# Patient Record
Sex: FEMALE | Race: WHITE | Employment: OTHER | URBAN - METROPOLITAN AREA
[De-identification: names, ages, dates, MRNs, and addresses within clinical notes are randomized per-mention and may not be internally consistent; named-entity substitution may affect disease eponyms.]

---

## 2017-01-10 ENCOUNTER — TELEPHONE (OUTPATIENT)
Dept: INTERNAL MEDICINE CLINIC | Facility: CLINIC | Age: 62
End: 2017-01-10

## 2017-01-10 ENCOUNTER — NURSE ONLY (OUTPATIENT)
Dept: HEMATOLOGY/ONCOLOGY | Facility: HOSPITAL | Age: 62
End: 2017-01-10
Attending: INTERNAL MEDICINE
Payer: MEDICAID

## 2017-01-10 VITALS
TEMPERATURE: 98 F | HEART RATE: 78 BPM | BODY MASS INDEX: 27.48 KG/M2 | DIASTOLIC BLOOD PRESSURE: 72 MMHG | HEIGHT: 66 IN | RESPIRATION RATE: 16 BRPM | WEIGHT: 171 LBS | SYSTOLIC BLOOD PRESSURE: 145 MMHG

## 2017-01-10 DIAGNOSIS — C50.411 BREAST CANCER OF UPPER-OUTER QUADRANT OF RIGHT FEMALE BREAST (HCC): Primary | ICD-10-CM

## 2017-01-10 DIAGNOSIS — Z09 CHEMOTHERAPY FOLLOW-UP EXAMINATION: ICD-10-CM

## 2017-01-10 DIAGNOSIS — E03.9 ACQUIRED HYPOTHYROIDISM: ICD-10-CM

## 2017-01-10 LAB
ALBUMIN SERPL BCP-MCNC: 3.8 G/DL (ref 3.5–4.8)
ALBUMIN/GLOB SERPL: 1.6 {RATIO} (ref 1–2)
ALP SERPL-CCNC: 64 U/L (ref 32–100)
ALT SERPL-CCNC: 32 U/L (ref 14–54)
ANION GAP SERPL CALC-SCNC: 9 MMOL/L (ref 0–18)
AST SERPL-CCNC: 29 U/L (ref 15–41)
BASOPHILS # BLD: 0 K/UL (ref 0–0.2)
BASOPHILS NFR BLD: 0 %
BILIRUB SERPL-MCNC: 0.6 MG/DL (ref 0.3–1.2)
BUN SERPL-MCNC: 11 MG/DL (ref 8–20)
BUN/CREAT SERPL: 25.6 (ref 10–20)
CALCIUM SERPL-MCNC: 8.8 MG/DL (ref 8.5–10.5)
CHLORIDE SERPL-SCNC: 99 MMOL/L (ref 95–110)
CO2 SERPL-SCNC: 23 MMOL/L (ref 22–32)
CREAT SERPL-MCNC: 0.43 MG/DL (ref 0.5–1.5)
EOSINOPHIL # BLD: 0 K/UL (ref 0–0.7)
EOSINOPHIL NFR BLD: 0 %
ERYTHROCYTE [DISTWIDTH] IN BLOOD BY AUTOMATED COUNT: 19.4 % (ref 11–15)
GLOBULIN PLAS-MCNC: 2.4 G/DL (ref 2.5–3.7)
GLUCOSE SERPL-MCNC: 119 MG/DL (ref 70–99)
HCT VFR BLD AUTO: 35.2 % (ref 35–48)
HGB BLD-MCNC: 11.8 G/DL (ref 12–16)
LYMPHOCYTES # BLD: 0.4 K/UL (ref 1–4)
LYMPHOCYTES NFR BLD: 8 %
MCH RBC QN AUTO: 32.5 PG (ref 27–32)
MCHC RBC AUTO-ENTMCNC: 33.6 G/DL (ref 32–37)
MCV RBC AUTO: 96.9 FL (ref 80–100)
MONOCYTES # BLD: 0.3 K/UL (ref 0–1)
MONOCYTES NFR BLD: 5 %
NEUTROPHILS # BLD AUTO: 4.3 K/UL (ref 1.8–7.7)
NEUTROPHILS NFR BLD: 87 %
OSMOLALITY UR CALC.SUM OF ELEC: 273 MOSM/KG (ref 275–295)
PLATELET # BLD AUTO: 140 K/UL (ref 140–400)
PMV BLD AUTO: 8.2 FL (ref 7.4–10.3)
POTASSIUM SERPL-SCNC: 4.1 MMOL/L (ref 3.3–5.1)
PROT SERPL-MCNC: 6.2 G/DL (ref 5.9–8.4)
RBC # BLD AUTO: 3.63 M/UL (ref 3.7–5.4)
SODIUM SERPL-SCNC: 131 MMOL/L (ref 136–144)
WBC # BLD AUTO: 4.9 K/UL (ref 4–11)

## 2017-01-10 PROCEDURE — 99215 OFFICE O/P EST HI 40 MIN: CPT | Performed by: NURSE PRACTITIONER

## 2017-01-10 PROCEDURE — 36415 COLL VENOUS BLD VENIPUNCTURE: CPT

## 2017-01-10 PROCEDURE — 85025 COMPLETE CBC W/AUTO DIFF WBC: CPT

## 2017-01-10 PROCEDURE — 80053 COMPREHEN METABOLIC PANEL: CPT

## 2017-01-10 RX ORDER — HEPARIN SODIUM (PORCINE) LOCK FLUSH IV SOLN 100 UNIT/ML 100 UNIT/ML
SOLUTION INTRAVENOUS
Status: DISCONTINUED
Start: 2017-01-10 | End: 2017-01-10 | Stop reason: WASHOUT

## 2017-01-10 RX ORDER — 0.9 % SODIUM CHLORIDE 0.9 %
VIAL (ML) INJECTION
Status: DISCONTINUED
Start: 2017-01-10 | End: 2017-01-10 | Stop reason: WASHOUT

## 2017-01-10 RX ORDER — HEPARIN SODIUM (PORCINE) LOCK FLUSH IV SOLN 100 UNIT/ML 100 UNIT/ML
5 SOLUTION INTRAVENOUS ONCE
Status: DISCONTINUED | OUTPATIENT
Start: 2017-01-10 | End: 2017-01-10

## 2017-01-10 RX ORDER — GABAPENTIN 100 MG/1
100 CAPSULE ORAL NIGHTLY
Qty: 60 CAPSULE | Refills: 0 | Status: SHIPPED | OUTPATIENT
Start: 2017-01-10 | End: 2017-09-06 | Stop reason: ALTCHOICE

## 2017-01-10 RX ORDER — SODIUM CHLORIDE 0.9 % (FLUSH) 0.9 %
SYRINGE (ML) INJECTION
Status: DISCONTINUED
Start: 2017-01-10 | End: 2017-01-10 | Stop reason: WASHOUT

## 2017-01-10 NOTE — PROGRESS NOTES
DAR         Jacinto Singh is a 63year old female who is here today for follow up of Breast cancer of upper-outer quadrant of right female breast (hcc)  (primary encounter diagnosis)  Chemotherapy follow-up examination       Pt here for follow up. Cardiovascular: Negative for chest pain, palpitations and leg swelling. Gastrointestinal: Positive for diarrhea (Day 3-5 ) and constipation (immediate after chemo).  Negative for nausea, vomiting, abdominal pain, blood in stool, abdominal distention and r • Lipoma of arm    • Cataract      Left eye         Past Surgical History    DILATION/CURETTAGE,DIAGNOSTIC      OTHER      Comment excision of lipoma at L arm and lower back       Social History   Marital Status:   Spouse Name: N/A    Years of Educ Pulmonary/Chest: Effort normal and breath sounds normal. No respiratory distress. She has no wheezes. Right breast exhibits mass. Right breast exhibits no inverted nipple and no skin change.  Left breast exhibits no inverted nipple, no mass, no nipple disch If staging w/u is negative for metastatic disease, then MRI will be obtained to determine extent of disease.   If all disease in on one quadrant, with neoadjuvant chemotherapy and HER2 clif dual blockade, she may be a candidate for breast conservation with t Calculated Osmolality 273 (L) 275-295 mOsm/kg   GFR, Non-African American >60 >=60   GFR, -American >60 >=60   -CBC W/ DIFFERENTIAL   Collection Time: 01/10/17 10:56 AM   Result Value Ref Range   WBC 4.9 4.0-11.0 K/UL   RBC 3.63 (L) 3.70-5.40 M/UL

## 2017-01-10 NOTE — PROGRESS NOTES
Pt is here for fast track labs. pt is c/o extreme fatigue ,labs drawn from lt ac with a  Butterfly needle and tolerated well. Discharged to [facility] and waiting for md appointment.

## 2017-01-11 ENCOUNTER — OFFICE VISIT (OUTPATIENT)
Dept: HEMATOLOGY/ONCOLOGY | Facility: HOSPITAL | Age: 62
End: 2017-01-11
Attending: INTERNAL MEDICINE
Payer: MEDICAID

## 2017-01-11 VITALS
RESPIRATION RATE: 16 BRPM | TEMPERATURE: 98 F | SYSTOLIC BLOOD PRESSURE: 143 MMHG | WEIGHT: 171 LBS | DIASTOLIC BLOOD PRESSURE: 70 MMHG | HEART RATE: 70 BPM | BODY MASS INDEX: 28 KG/M2

## 2017-01-11 DIAGNOSIS — C50.411 BREAST CANCER OF UPPER-OUTER QUADRANT OF RIGHT FEMALE BREAST (HCC): Primary | ICD-10-CM

## 2017-01-11 PROCEDURE — 96417 CHEMO IV INFUS EACH ADDL SEQ: CPT

## 2017-01-11 PROCEDURE — 96372 THER/PROPH/DIAG INJ SC/IM: CPT

## 2017-01-11 PROCEDURE — 96413 CHEMO IV INFUSION 1 HR: CPT

## 2017-01-11 PROCEDURE — 96375 TX/PRO/DX INJ NEW DRUG ADDON: CPT

## 2017-01-11 RX ORDER — SODIUM CHLORIDE 9 MG/ML
INJECTION, SOLUTION INTRAVENOUS
Status: DISCONTINUED
Start: 2017-01-11 | End: 2017-01-11

## 2017-01-11 RX ORDER — SODIUM CHLORIDE 0.9 % (FLUSH) 0.9 %
SYRINGE (ML) INJECTION
Status: DISCONTINUED
Start: 2017-01-11 | End: 2017-01-11

## 2017-01-11 RX ORDER — HEPARIN SODIUM (PORCINE) LOCK FLUSH IV SOLN 100 UNIT/ML 100 UNIT/ML
5 SOLUTION INTRAVENOUS ONCE
Status: COMPLETED | OUTPATIENT
Start: 2017-01-11 | End: 2017-01-11

## 2017-01-11 RX ORDER — 0.9 % SODIUM CHLORIDE 0.9 %
10 VIAL (ML) INJECTION ONCE
Status: COMPLETED | OUTPATIENT
Start: 2017-01-11 | End: 2017-01-11

## 2017-01-11 RX ORDER — HEPARIN SODIUM (PORCINE) LOCK FLUSH IV SOLN 100 UNIT/ML 100 UNIT/ML
SOLUTION INTRAVENOUS
Status: COMPLETED
Start: 2017-01-11 | End: 2017-01-11

## 2017-01-11 RX ORDER — 0.9 % SODIUM CHLORIDE 0.9 %
VIAL (ML) INJECTION
Status: COMPLETED
Start: 2017-01-11 | End: 2017-01-11

## 2017-01-11 RX ADMIN — 0.9 % SODIUM CHLORIDE 10 ML: 0.9 % VIAL (ML) INJECTION at 15:20:00

## 2017-01-11 RX ADMIN — HEPARIN SODIUM (PORCINE) LOCK FLUSH IV SOLN 100 UNIT/ML 500 UNITS: 100 SOLUTION INTRAVENOUS at 15:20:00

## 2017-01-11 NOTE — PROGRESS NOTES
Patient here for Cycle 5 day1 TCHP, patient reports painful neuropathy in fingers and feet. Patient states she is using therapy balls every day. Nail discoloration noted on both hands.   Patient states she has mouth sensitivity all the time, worse after c

## 2017-01-15 RX ORDER — ONDANSETRON 4 MG/1
4 TABLET, ORALLY DISINTEGRATING ORAL EVERY 8 HOURS PRN
Qty: 30 TABLET | Refills: 1 | Status: SHIPPED | OUTPATIENT
Start: 2017-01-15 | End: 2017-03-08

## 2017-01-26 ENCOUNTER — HOSPITAL ENCOUNTER (OUTPATIENT)
Dept: CV DIAGNOSTICS | Facility: HOSPITAL | Age: 62
Discharge: HOME | End: 2017-01-26
Attending: INTERNAL MEDICINE
Payer: MEDICAID

## 2017-01-26 DIAGNOSIS — Z09 CHEMOTHERAPY FOLLOW-UP EXAMINATION: ICD-10-CM

## 2017-01-26 DIAGNOSIS — E03.9 ACQUIRED HYPOTHYROIDISM: ICD-10-CM

## 2017-01-26 DIAGNOSIS — C50.411 BREAST CANCER OF UPPER-OUTER QUADRANT OF RIGHT FEMALE BREAST (HCC): ICD-10-CM

## 2017-01-26 PROCEDURE — 93306 TTE W/DOPPLER COMPLETE: CPT

## 2017-01-26 PROCEDURE — 93306 TTE W/DOPPLER COMPLETE: CPT | Performed by: INTERNAL MEDICINE

## 2017-01-31 ENCOUNTER — SOCIAL WORK SERVICES (OUTPATIENT)
Dept: HEMATOLOGY/ONCOLOGY | Facility: HOSPITAL | Age: 62
End: 2017-01-31

## 2017-01-31 ENCOUNTER — NURSE ONLY (OUTPATIENT)
Dept: HEMATOLOGY/ONCOLOGY | Facility: HOSPITAL | Age: 62
End: 2017-01-31
Attending: INTERNAL MEDICINE
Payer: MEDICAID

## 2017-01-31 VITALS
TEMPERATURE: 98 F | HEART RATE: 75 BPM | WEIGHT: 167 LBS | RESPIRATION RATE: 16 BRPM | SYSTOLIC BLOOD PRESSURE: 153 MMHG | DIASTOLIC BLOOD PRESSURE: 83 MMHG | HEIGHT: 66 IN | BODY MASS INDEX: 26.84 KG/M2

## 2017-01-31 DIAGNOSIS — Z09 CHEMOTHERAPY FOLLOW-UP EXAMINATION: ICD-10-CM

## 2017-01-31 DIAGNOSIS — C50.411 BREAST CANCER OF UPPER-OUTER QUADRANT OF RIGHT FEMALE BREAST (HCC): Primary | ICD-10-CM

## 2017-01-31 LAB
ALBUMIN SERPL BCP-MCNC: 3.9 G/DL (ref 3.5–4.8)
ALBUMIN/GLOB SERPL: 1.9 {RATIO} (ref 1–2)
ALP SERPL-CCNC: 64 U/L (ref 32–100)
ALT SERPL-CCNC: 34 U/L (ref 14–54)
ANION GAP SERPL CALC-SCNC: 7 MMOL/L (ref 0–18)
AST SERPL-CCNC: 34 U/L (ref 15–41)
BASOPHILS # BLD: 0 K/UL (ref 0–0.2)
BASOPHILS NFR BLD: 1 %
BILIRUB SERPL-MCNC: 0.3 MG/DL (ref 0.3–1.2)
BUN SERPL-MCNC: 9 MG/DL (ref 8–20)
BUN/CREAT SERPL: 17.6 (ref 10–20)
CALCIUM SERPL-MCNC: 9.1 MG/DL (ref 8.5–10.5)
CHLORIDE SERPL-SCNC: 102 MMOL/L (ref 95–110)
CO2 SERPL-SCNC: 27 MMOL/L (ref 22–32)
CREAT SERPL-MCNC: 0.51 MG/DL (ref 0.5–1.5)
EOSINOPHIL # BLD: 0 K/UL (ref 0–0.7)
EOSINOPHIL NFR BLD: 0 %
ERYTHROCYTE [DISTWIDTH] IN BLOOD BY AUTOMATED COUNT: 20.3 % (ref 11–15)
GLOBULIN PLAS-MCNC: 2.1 G/DL (ref 2.5–3.7)
GLUCOSE SERPL-MCNC: 110 MG/DL (ref 70–99)
HCT VFR BLD AUTO: 33.8 % (ref 35–48)
HGB BLD-MCNC: 11.3 G/DL (ref 12–16)
LYMPHOCYTES # BLD: 0.4 K/UL (ref 1–4)
LYMPHOCYTES NFR BLD: 10 %
MCH RBC QN AUTO: 33.9 PG (ref 27–32)
MCHC RBC AUTO-ENTMCNC: 33.6 G/DL (ref 32–37)
MCV RBC AUTO: 101 FL (ref 80–100)
MONOCYTES # BLD: 0.1 K/UL (ref 0–1)
MONOCYTES NFR BLD: 4 %
NEUTROPHILS # BLD AUTO: 3.4 K/UL (ref 1.8–7.7)
NEUTROPHILS NFR BLD: 86 %
OSMOLALITY UR CALC.SUM OF ELEC: 281 MOSM/KG (ref 275–295)
PLATELET # BLD AUTO: 110 K/UL (ref 140–400)
PMV BLD AUTO: 8.5 FL (ref 7.4–10.3)
POTASSIUM SERPL-SCNC: 4.2 MMOL/L (ref 3.3–5.1)
PROT SERPL-MCNC: 6 G/DL (ref 5.9–8.4)
RBC # BLD AUTO: 3.35 M/UL (ref 3.7–5.4)
SODIUM SERPL-SCNC: 136 MMOL/L (ref 136–144)
WBC # BLD AUTO: 3.9 K/UL (ref 4–11)

## 2017-01-31 PROCEDURE — 36415 COLL VENOUS BLD VENIPUNCTURE: CPT

## 2017-01-31 PROCEDURE — 85025 COMPLETE CBC W/AUTO DIFF WBC: CPT

## 2017-01-31 PROCEDURE — 99215 OFFICE O/P EST HI 40 MIN: CPT | Performed by: INTERNAL MEDICINE

## 2017-01-31 PROCEDURE — 80053 COMPREHEN METABOLIC PANEL: CPT

## 2017-01-31 NOTE — PROGRESS NOTES
DAR Greenfield is a 63year old female who is here today for follow up of Breast cancer of upper-outer quadrant of right female breast (hcc)  (primary encounter diagnosis)  Chemotherapy follow-up examination       Pt here for follow up. Cardiovascular: Negative for chest pain, palpitations and leg swelling. Gastrointestinal: Positive for nausea, diarrhea (Day 3-5 ) and constipation (immediate after chemo).  Negative for vomiting, abdominal pain, blood in stool, abdominal distention and r Vitamin D3 1000 UNITS Oral Tab Take 1,000 Units by mouth daily. Disp:  Rfl:    Coenzyme Q10 (COQ10 OR) Take by mouth daily. Disp:  Rfl:    LUTEIN-ZEAXANTHIN OR Take by mouth daily.    Disp:  Rfl:    Levothyroxine Sodium 112 MCG Oral Tab Take 112 mcg by mo Cardiovascular: Normal rate, regular rhythm and normal heart sounds. No murmur heard. Pulmonary/Chest: Effort normal and breath sounds normal. No respiratory distress. She has no wheezes. Right breast exhibits mass.  Right breast exhibits no inverted ni On neoadjuvant chemotherapy with s/p cycle 5 TCHP, she may be a candidate for breast conservation with targeted LN dissection if has good response to treatment, followed by RT to the breast and axilla.   If she has disease in more than one quadrant, would t Lymphocyte % 10 %   Monocyte % 4 %   Eosinophil % 0 %   Basophil % 1 %   Neutrophil Absolute 3.4 1.8-7.7 K/UL   Lymphocyte Absolute 0.4 (L) 1.0-4.0 K/UL   Monocyte Absolute 0.1 0.0-1.0 K/UL   Eosinophil Absolute 0.0 0.0-0.7 K/UL   Basophil Absolute 0.0 0.0 2. Aortic valve: Sclerosis without stenosis. Trivial regurgitation. 3. Left atrium: The atrium was mildly dilated. 4. Pulmonary arteries: Systolic pressure was within the normal range.   5. Normal GLS strain pattern at -24% ( previously -20%) this represe Right ventricle:  The cavity size was normal. Systolic function was normal.  Pulmonic valve:   Not well visualized.  The valve appears to be grossly normal.     Doppler:   There was no evidence for stenosis.    Trivial regurgitation.   Tricuspid valve:   St E/Ea, lateral annulus, tissue Doppler           9.42        -------  Ea, medial annulus, tissue Doppler              6.53 cm/s   -------  E/Ea, medial annulus, tissue Doppler           10.12        -------  Mitral valve  Peak E-wave velocity

## 2017-01-31 NOTE — PROGRESS NOTES
Patient to center for fast track labs  Labs drawn peripherally from [facility] 2 x 2 and tape to site  Stable upon discharged  to waiting area

## 2017-01-31 NOTE — PROGRESS NOTES
SW met with patient per referral from Dr. Stephany Kirby. Patient expresses question concerning [facility] and whether she should switch her primary insurance to [facility] as [facility] coverage will be changing as of January 2018.  SW provide supp

## 2017-02-01 ENCOUNTER — OFFICE VISIT (OUTPATIENT)
Dept: HEMATOLOGY/ONCOLOGY | Facility: HOSPITAL | Age: 62
End: 2017-02-01
Attending: INTERNAL MEDICINE
Payer: MEDICAID

## 2017-02-01 VITALS
SYSTOLIC BLOOD PRESSURE: 166 MMHG | TEMPERATURE: 98 F | HEART RATE: 75 BPM | RESPIRATION RATE: 16 BRPM | DIASTOLIC BLOOD PRESSURE: 82 MMHG

## 2017-02-01 DIAGNOSIS — C50.411 BREAST CANCER OF UPPER-OUTER QUADRANT OF RIGHT FEMALE BREAST (HCC): Primary | ICD-10-CM

## 2017-02-01 PROCEDURE — 96413 CHEMO IV INFUSION 1 HR: CPT

## 2017-02-01 PROCEDURE — 96417 CHEMO IV INFUS EACH ADDL SEQ: CPT

## 2017-02-01 PROCEDURE — 96372 THER/PROPH/DIAG INJ SC/IM: CPT

## 2017-02-01 PROCEDURE — 96375 TX/PRO/DX INJ NEW DRUG ADDON: CPT

## 2017-02-01 RX ORDER — HEPARIN SODIUM (PORCINE) LOCK FLUSH IV SOLN 100 UNIT/ML 100 UNIT/ML
SOLUTION INTRAVENOUS
Status: COMPLETED
Start: 2017-02-01 | End: 2017-02-01

## 2017-02-01 RX ORDER — HEPARIN SODIUM (PORCINE) LOCK FLUSH IV SOLN 100 UNIT/ML 100 UNIT/ML
5 SOLUTION INTRAVENOUS ONCE
Status: COMPLETED | OUTPATIENT
Start: 2017-02-01 | End: 2017-02-01

## 2017-02-01 RX ORDER — SODIUM CHLORIDE 9 MG/ML
INJECTION, SOLUTION INTRAVENOUS
Status: DISCONTINUED
Start: 2017-02-01 | End: 2017-02-01

## 2017-02-01 RX ORDER — SODIUM CHLORIDE 0.9 % (FLUSH) 0.9 %
SYRINGE (ML) INJECTION
Status: DISCONTINUED
Start: 2017-02-01 | End: 2017-02-01

## 2017-02-01 RX ORDER — 0.9 % SODIUM CHLORIDE 0.9 %
VIAL (ML) INJECTION
Status: DISCONTINUED
Start: 2017-02-01 | End: 2017-02-01

## 2017-02-01 RX ADMIN — HEPARIN SODIUM (PORCINE) LOCK FLUSH IV SOLN 100 UNIT/ML 500 UNITS: 100 SOLUTION INTRAVENOUS at 14:56:00

## 2017-02-01 NOTE — PROGRESS NOTES
Pt presents to infusion today for C6D1 TCHP. Pt appears well and offers no complaints of nausea/vomiting/constipation/diarrhea at this time. Pt denies any fevers or flu like symptoms.   Pt states that after chemo, for the next couple days she experiences

## 2017-02-02 ENCOUNTER — TELEPHONE (OUTPATIENT)
Dept: INTERNAL MEDICINE CLINIC | Facility: CLINIC | Age: 62
End: 2017-02-02

## 2017-02-02 ENCOUNTER — TELEPHONE (OUTPATIENT)
Dept: HEMATOLOGY/ONCOLOGY | Facility: HOSPITAL | Age: 62
End: 2017-02-02

## 2017-02-02 RX ORDER — CIPROFLOXACIN 500 MG/1
TABLET, FILM COATED ORAL
COMMUNITY
Start: 2017-01-22 | End: 2017-02-22 | Stop reason: ALTCHOICE

## 2017-02-02 NOTE — TELEPHONE ENCOUNTER
Phoned patient and scheduled for post neoadjuvant chemotherapy breast MRI for Thursday 2/9/17. Patient instructed to arrive to [facility] at 11am.  Scheduled patient for post imaging follow up with Dr. Grace Cooper for Wednesday 2/15/17.   Patient acknowle

## 2017-02-09 ENCOUNTER — HOSPITAL ENCOUNTER (OUTPATIENT)
Dept: MRI IMAGING | Facility: HOSPITAL | Age: 62
Discharge: HOME | End: 2017-02-09
Attending: INTERNAL MEDICINE
Payer: MEDICAID

## 2017-02-09 DIAGNOSIS — C50.411 BREAST CANCER OF UPPER-OUTER QUADRANT OF RIGHT FEMALE BREAST (HCC): ICD-10-CM

## 2017-02-09 PROCEDURE — A9575 INJ GADOTERATE MEGLUMI 0.1ML: HCPCS | Performed by: INTERNAL MEDICINE

## 2017-02-09 PROCEDURE — 0159T MRI BREAST (W+WO) W/CAD BILAT (CPT=77059/0159T): CPT

## 2017-02-09 PROCEDURE — 77059 MRI BREAST (W+WO) W/CAD BILAT (CPT=77059/0159T): CPT

## 2017-02-14 ENCOUNTER — TELEPHONE (OUTPATIENT)
Dept: INTERNAL MEDICINE CLINIC | Facility: CLINIC | Age: 62
End: 2017-02-14

## 2017-02-14 DIAGNOSIS — C50.411 BREAST CANCER OF UPPER-OUTER QUADRANT OF RIGHT FEMALE BREAST (HCC): Primary | ICD-10-CM

## 2017-02-15 ENCOUNTER — RESEARCH ENCOUNTER (OUTPATIENT)
Dept: HEMATOLOGY/ONCOLOGY | Facility: HOSPITAL | Age: 62
End: 2017-02-15

## 2017-02-15 ENCOUNTER — OFFICE VISIT (OUTPATIENT)
Dept: SURGERY | Facility: CLINIC | Age: 62
End: 2017-02-15

## 2017-02-15 VITALS
HEART RATE: 70 BPM | DIASTOLIC BLOOD PRESSURE: 75 MMHG | HEIGHT: 66 IN | SYSTOLIC BLOOD PRESSURE: 144 MMHG | BODY MASS INDEX: 26.71 KG/M2 | WEIGHT: 166.19 LBS

## 2017-02-15 DIAGNOSIS — C50.411 BREAST CANCER OF UPPER-OUTER QUADRANT OF RIGHT FEMALE BREAST (HCC): Primary | ICD-10-CM

## 2017-02-15 PROCEDURE — 99215 OFFICE O/P EST HI 40 MIN: CPT | Performed by: SURGERY

## 2017-02-15 NOTE — PROGRESS NOTES
Presented pre-op instructions verbally and in written form. Pt to have Right wire localized partial mastectomy, Right axillary wire localization, Right lymphoscintigraphy and sentinel lymph node biopsy, possible right axillary lymph node dissection.  Pt destini

## 2017-02-15 NOTE — PROGRESS NOTES
Patient seen after visit with Dr. Mackenzie Arauz for discussion of her potential eligibility for the clinical trial [id number], \"A Randomized Phase III Trial Comparing Axillary Lymph Node Dissection to Axillary Radiation in Breast Cancer Patients (cT1-3 N1) Who Have consent [facility] Consent Version #6 1/23/17) for [id number]. Patient expressed that she could be potentially interested in the study. Encouraged patient to take the consent document home to review it and to discuss it with her family and/or friends.  Patient repo

## 2017-02-15 NOTE — PROGRESS NOTES
Follow up: Right breast cancer. PT has completed chemotherapy. New MRI. Pt reports neuropathy pain in fingertips. Rates pain 5/10.      Education Record    Learner:  Patient    Disease / Diagnosis: Right breast cancer     Barriers / Limitations:  None   Com

## 2017-02-16 NOTE — PROGRESS NOTES
Breast Surgery Surveillance    History of Present Illness:   Ms. Laurie Last is a 63year old woman who presents with a right imaging detected breast cancer in September 2016. She was referred for diagnostic imaging which is detailed below.  She de DILATION/CURETTAGE,DIAGNOSTIC      OTHER      Comment excision of lipoma at L arm and lower back     Gynecological History:  Pt is nulliparous.   She achieved menarche at age 17   Age of Menopause: 54's  Type: Natural  She denies any history of hormone repl oz/week 1 Glasses of wine per week       Smoking status: Current Some Day Smoker 0.50 Packs/Day For 30.00 Years   Types: Cigarettes   Smokeless tobacco: Never Used   The patient is . She has no children. She is self-employed as a .     Rev bruising or bleeding or persistent swollen glands or lymph nodes. Musculoskeletal:  The patient denies muscle aches/pain, joint pain, stiff joints, neck pain, back pain or bone pain.     Neuropsychiatric:  There is no history of migraines or severe head dimpling with movement of the pectoralis. There is no nipple retraction. No nipple discharge can be elicited. The parenchyma is mildly nodular. There are no dominant masses in the breast. The axillary tail is normal.    Abdomen:   The abdomen is soft, flat benefits of sentinel node biopsy, the possible need for axillary dissection, and the long-term sequelae of this procedure.     I explained that in light of the multi-focal large disease, with the great MRI response to chemotherapy, she is now a good candida

## 2017-02-21 ENCOUNTER — RESEARCH ENCOUNTER (OUTPATIENT)
Dept: HEMATOLOGY/ONCOLOGY | Facility: HOSPITAL | Age: 62
End: 2017-02-21

## 2017-02-21 NOTE — PROGRESS NOTES
Contacted patient via telephone at [phone].  Patient expressed she reviewed the consent document for [id number], \"A Randomized Phase III Trial Comparing Axillary Lymph Node Dissection to Axillary Radiation in Breast Cancer Patients (cT1-3 N1) Who Have Positive S

## 2017-02-22 ENCOUNTER — OFFICE VISIT (OUTPATIENT)
Dept: HEMATOLOGY/ONCOLOGY | Facility: HOSPITAL | Age: 62
End: 2017-02-22
Attending: INTERNAL MEDICINE
Payer: MEDICAID

## 2017-02-22 ENCOUNTER — TELEPHONE (OUTPATIENT)
Dept: INTERNAL MEDICINE CLINIC | Facility: CLINIC | Age: 62
End: 2017-02-22

## 2017-02-22 VITALS
SYSTOLIC BLOOD PRESSURE: 122 MMHG | WEIGHT: 167 LBS | RESPIRATION RATE: 16 BRPM | BODY MASS INDEX: 26.84 KG/M2 | DIASTOLIC BLOOD PRESSURE: 77 MMHG | HEART RATE: 70 BPM | HEIGHT: 66 IN | TEMPERATURE: 98 F

## 2017-02-22 DIAGNOSIS — R59.9 ENLARGED LYMPH NODE: ICD-10-CM

## 2017-02-22 DIAGNOSIS — C50.411 MALIGNANT NEOPLASM OF UPPER-OUTER QUADRANT OF RIGHT FEMALE BREAST (HCC): Primary | ICD-10-CM

## 2017-02-22 DIAGNOSIS — Z09 CHEMOTHERAPY FOLLOW-UP EXAMINATION: ICD-10-CM

## 2017-02-22 DIAGNOSIS — C50.411 BREAST CANCER OF UPPER-OUTER QUADRANT OF RIGHT FEMALE BREAST (HCC): Primary | ICD-10-CM

## 2017-02-22 PROCEDURE — 99215 OFFICE O/P EST HI 40 MIN: CPT | Performed by: NURSE PRACTITIONER

## 2017-02-22 PROCEDURE — 96413 CHEMO IV INFUSION 1 HR: CPT

## 2017-02-22 RX ORDER — 0.9 % SODIUM CHLORIDE 0.9 %
VIAL (ML) INJECTION
Status: DISCONTINUED
Start: 2017-02-22 | End: 2017-02-22

## 2017-02-22 RX ORDER — HEPARIN SODIUM (PORCINE) LOCK FLUSH IV SOLN 100 UNIT/ML 100 UNIT/ML
5 SOLUTION INTRAVENOUS ONCE
Status: COMPLETED | OUTPATIENT
Start: 2017-02-22 | End: 2017-02-22

## 2017-02-22 RX ORDER — HEPARIN SODIUM (PORCINE) LOCK FLUSH IV SOLN 100 UNIT/ML 100 UNIT/ML
SOLUTION INTRAVENOUS
Status: COMPLETED
Start: 2017-02-22 | End: 2017-02-22

## 2017-02-22 RX ORDER — SODIUM CHLORIDE 9 MG/ML
INJECTION, SOLUTION INTRAVENOUS
Status: DISCONTINUED
Start: 2017-02-22 | End: 2017-02-22

## 2017-02-22 RX ADMIN — HEPARIN SODIUM (PORCINE) LOCK FLUSH IV SOLN 100 UNIT/ML 500 UNITS: 100 SOLUTION INTRAVENOUS at 12:53:00

## 2017-02-22 NOTE — PROGRESS NOTES
Pt arrived to infusion after MD appointment for C7D1 Herceptin. Pt is scheduled for breast surgery next week. She will be delayed for cycle 8 one week because of surgery. Schedule changed for patient so her next treatment well be 3/22.  She is feeling well,

## 2017-02-22 NOTE — PROGRESS NOTES
DAR Deluna is a 63year old female who is here today for follow up of Breast cancer of upper-outer quadrant of right female breast (hcc)  (primary encounter diagnosis)  Chemotherapy follow-up examination       Pt here for follow up. Hands and feet skin reddened dry and cracking  Nail changes    Allergic/Immunologic: Positive for environmental allergies. Neurological: Positive for numbness (numbness and tingling to fingertips and toes ).  Negative for dizziness, weakness, light-headed Years of Education: N/A  Number of Children: 0     Occupational History  [employer]        Social History Main Topics   Smoking status: Current Some Day Smoker  0.50 Packs/Day  For 30.00 Years     Types: Cigarettes    Smokeless tobacco: Never Used Musculoskeletal: She exhibits no edema or tenderness. Lymphadenopathy:        Head (right side): No submental, no submandibular, no preauricular, no posterior auricular and no occipital adenopathy present.         Head (left side): No submental, no subman Will complete herceptin for 1 year. Will delay x 1 week next cycle as pt will be postop. Monitoring cardiac toxicity:  Next echo due in April 2017. No orders of the defined types were placed in this encounter.        Results From Past 48 Hours:  N RIGHT BREAST: Curvilinear T2 hyperintensity seen in the upper central right breast is seen at the site of the [facility] clip (3/28). There is no suspicious surrounding enhancement.  Susceptibility artifact from the coil and ribbon clips is seen in series   DIAGNOSTIC CATEGORY 6--KNOWN BIOPSY PROVEN MALIGNANCY: RIGHT BREAST.         RECOMMENDATIONS:  SURGICAL CONSULTATION.          PLEASE NOTE:  A NORMAL MRI DOES NOT EXCLUDE THE POSSIBILITY OF BREAST CANCER.   A CLINICALLY SUSPICIOUS PALPABLE LUMP SHOULD BE BIO

## 2017-03-01 ENCOUNTER — APPOINTMENT (OUTPATIENT)
Dept: RADIATION ONCOLOGY | Facility: HOSPITAL | Age: 62
End: 2017-03-01
Attending: RADIOLOGY
Payer: MEDICAID

## 2017-03-03 RX ORDER — GABAPENTIN 300 MG/1
300 CAPSULE ORAL 3 TIMES DAILY
Qty: 270 CAPSULE | Refills: 3 | Status: SHIPPED | OUTPATIENT
Start: 2017-03-03 | End: 2018-02-21

## 2017-03-07 ENCOUNTER — OFFICE VISIT (OUTPATIENT)
Dept: INTERNAL MEDICINE CLINIC | Facility: CLINIC | Age: 62
End: 2017-03-07

## 2017-03-07 VITALS
HEIGHT: 66 IN | HEART RATE: 74 BPM | OXYGEN SATURATION: 98 % | BODY MASS INDEX: 26.14 KG/M2 | WEIGHT: 162.63 LBS | DIASTOLIC BLOOD PRESSURE: 80 MMHG | SYSTOLIC BLOOD PRESSURE: 136 MMHG | RESPIRATION RATE: 16 BRPM

## 2017-03-07 DIAGNOSIS — Z01.818 PREOPERATIVE CLEARANCE: Primary | ICD-10-CM

## 2017-03-07 DIAGNOSIS — C50.411 BREAST CANCER OF UPPER-OUTER QUADRANT OF RIGHT FEMALE BREAST (HCC): ICD-10-CM

## 2017-03-07 DIAGNOSIS — E03.9 ACQUIRED HYPOTHYROIDISM: ICD-10-CM

## 2017-03-07 LAB
ALBUMIN SERPL BCP-MCNC: 4 G/DL (ref 3.5–4.8)
ALBUMIN/GLOB SERPL: 1.8 {RATIO} (ref 1–2)
ALP SERPL-CCNC: 57 U/L (ref 32–100)
ALT SERPL-CCNC: 12 U/L (ref 14–54)
ANION GAP SERPL CALC-SCNC: 7 MMOL/L (ref 0–18)
APTT PPP: 31.2 SECONDS (ref 23.2–35.3)
AST SERPL-CCNC: 20 U/L (ref 15–41)
BACTERIA UR QL AUTO: NEGATIVE /HPF
BILIRUB SERPL-MCNC: 0.6 MG/DL (ref 0.3–1.2)
BUN SERPL-MCNC: 13 MG/DL (ref 8–20)
BUN/CREAT SERPL: 22 (ref 10–20)
CALCIUM SERPL-MCNC: 9.1 MG/DL (ref 8.5–10.5)
CHLORIDE SERPL-SCNC: 104 MMOL/L (ref 95–110)
CO2 SERPL-SCNC: 26 MMOL/L (ref 22–32)
CREAT SERPL-MCNC: 0.59 MG/DL (ref 0.5–1.5)
GLOBULIN PLAS-MCNC: 2.2 G/DL (ref 2.5–3.7)
GLUCOSE SERPL-MCNC: 99 MG/DL (ref 70–99)
INR BLD: 1 (ref 0.9–1.2)
OSMOLALITY UR CALC.SUM OF ELEC: 284 MOSM/KG (ref 275–295)
POTASSIUM SERPL-SCNC: 4 MMOL/L (ref 3.3–5.1)
PROT SERPL-MCNC: 6.2 G/DL (ref 5.9–8.4)
PROTHROMBIN TIME: 12.4 SECONDS (ref 11.8–14.5)
RBC #/AREA URNS AUTO: 11 /HPF
SODIUM SERPL-SCNC: 137 MMOL/L (ref 136–144)
WBC #/AREA URNS AUTO: <1 /HPF

## 2017-03-07 PROCEDURE — 99214 OFFICE O/P EST MOD 30 MIN: CPT | Performed by: INTERNAL MEDICINE

## 2017-03-07 PROCEDURE — 85730 THROMBOPLASTIN TIME PARTIAL: CPT | Performed by: INTERNAL MEDICINE

## 2017-03-07 PROCEDURE — 85610 PROTHROMBIN TIME: CPT | Performed by: INTERNAL MEDICINE

## 2017-03-07 PROCEDURE — 80053 COMPREHEN METABOLIC PANEL: CPT | Performed by: INTERNAL MEDICINE

## 2017-03-07 PROCEDURE — 81015 MICROSCOPIC EXAM OF URINE: CPT | Performed by: INTERNAL MEDICINE

## 2017-03-07 PROCEDURE — 36415 COLL VENOUS BLD VENIPUNCTURE: CPT | Performed by: INTERNAL MEDICINE

## 2017-03-07 NOTE — PROGRESS NOTES
HPI:    Patient ID: Reinalod Tracey is a 63year old female. HPIPt here for a peop evaluation for urgical intervention for right breast cancer. Has has a complete response to to chemo therapy .   Has tolerated chemo except for for neuropathy in the Constitutional: She is oriented to person, place, and time. She appears well-developed and well-nourished. No distress. HENT:   Head: Normocephalic and atraumatic. Mouth/Throat: No oropharyngeal exudate.    Eyes: Conjunctivae are normal. Pupils are eq

## 2017-03-08 ENCOUNTER — TELEPHONE (OUTPATIENT)
Dept: HEMATOLOGY/ONCOLOGY | Facility: HOSPITAL | Age: 62
End: 2017-03-08

## 2017-03-08 NOTE — TELEPHONE ENCOUNTER
Patient phoned with questions concerning surgical scheduling. Patient shared she received call from PAT, sharing with her that she was to arrive to the hospital prior to her 7:20am wire Loc.   She was informed her OR time was 2-3pm.  Patient expressed conc

## 2017-03-13 ENCOUNTER — ANESTHESIA (OUTPATIENT)
Dept: SURGERY | Facility: HOSPITAL | Age: 62
End: 2017-03-13
Payer: MEDICAID

## 2017-03-13 ENCOUNTER — HOSPITAL ENCOUNTER (OUTPATIENT)
Dept: MAMMOGRAPHY | Facility: HOSPITAL | Age: 62
Discharge: HOME | End: 2017-03-13
Attending: SURGERY
Payer: MEDICAID

## 2017-03-13 ENCOUNTER — SURGERY (OUTPATIENT)
Age: 62
End: 2017-03-13

## 2017-03-13 ENCOUNTER — HOSPITAL ENCOUNTER (OUTPATIENT)
Facility: HOSPITAL | Age: 62
Setting detail: HOSPITAL OUTPATIENT SURGERY
Discharge: HOME | End: 2017-03-13
Attending: SURGERY | Admitting: SURGERY
Payer: MEDICAID

## 2017-03-13 ENCOUNTER — APPOINTMENT (OUTPATIENT)
Dept: MAMMOGRAPHY | Facility: HOSPITAL | Age: 62
End: 2017-03-13
Attending: SURGERY
Payer: MEDICAID

## 2017-03-13 ENCOUNTER — ANESTHESIA EVENT (OUTPATIENT)
Dept: SURGERY | Facility: HOSPITAL | Age: 62
End: 2017-03-13
Payer: MEDICAID

## 2017-03-13 ENCOUNTER — NURSE NAVIGATOR ENCOUNTER (OUTPATIENT)
Dept: HEMATOLOGY/ONCOLOGY | Facility: HOSPITAL | Age: 62
End: 2017-03-13

## 2017-03-13 ENCOUNTER — HOSPITAL ENCOUNTER (OUTPATIENT)
Dept: ULTRASOUND IMAGING | Facility: HOSPITAL | Age: 62
Discharge: HOME | End: 2017-03-13
Attending: SURGERY
Payer: MEDICAID

## 2017-03-13 ENCOUNTER — HOSPITAL ENCOUNTER (OUTPATIENT)
Dept: NUCLEAR MEDICINE | Facility: HOSPITAL | Age: 62
Discharge: HOME | End: 2017-03-13
Attending: SURGERY
Payer: MEDICAID

## 2017-03-13 VITALS
OXYGEN SATURATION: 98 % | SYSTOLIC BLOOD PRESSURE: 132 MMHG | RESPIRATION RATE: 18 BRPM | HEART RATE: 64 BPM | DIASTOLIC BLOOD PRESSURE: 71 MMHG

## 2017-03-13 VITALS
HEART RATE: 77 BPM | SYSTOLIC BLOOD PRESSURE: 143 MMHG | RESPIRATION RATE: 16 BRPM | DIASTOLIC BLOOD PRESSURE: 68 MMHG | TEMPERATURE: 98 F | HEIGHT: 66 IN | OXYGEN SATURATION: 99 % | WEIGHT: 161 LBS | BODY MASS INDEX: 25.88 KG/M2

## 2017-03-13 DIAGNOSIS — C50.911 BREAST CANCER, RIGHT (HCC): ICD-10-CM

## 2017-03-13 DIAGNOSIS — C50.411 BREAST CANCER OF UPPER-OUTER QUADRANT OF RIGHT FEMALE BREAST (HCC): ICD-10-CM

## 2017-03-13 DIAGNOSIS — C50.411 BREAST CANCER OF UPPER-OUTER QUADRANT OF RIGHT FEMALE BREAST (HCC): Primary | ICD-10-CM

## 2017-03-13 PROCEDURE — 88331 PATH CONSLTJ SURG 1 BLK 1SPC: CPT | Performed by: SURGERY

## 2017-03-13 PROCEDURE — 78195 LYMPH SYSTEM IMAGING: CPT

## 2017-03-13 PROCEDURE — 88342 IMHCHEM/IMCYTCHM 1ST ANTB: CPT | Performed by: SURGERY

## 2017-03-13 PROCEDURE — 0HBT0ZZ EXCISION OF RIGHT BREAST, OPEN APPROACH: ICD-10-PCS | Performed by: SURGERY

## 2017-03-13 PROCEDURE — 77065 DX MAMMO INCL CAD UNI: CPT

## 2017-03-13 PROCEDURE — 0HRTXKZ: ICD-10-PCS | Performed by: SURGERY

## 2017-03-13 PROCEDURE — 76098 X-RAY EXAM SURGICAL SPECIMEN: CPT

## 2017-03-13 PROCEDURE — 19285 PERQ DEV BREAST 1ST US IMAG: CPT

## 2017-03-13 PROCEDURE — 88341 IMHCHEM/IMCYTCHM EA ADD ANTB: CPT | Performed by: SURGERY

## 2017-03-13 PROCEDURE — 88307 TISSUE EXAM BY PATHOLOGIST: CPT | Performed by: SURGERY

## 2017-03-13 PROCEDURE — 07B50ZZ EXCISION OF RIGHT AXILLARY LYMPHATIC, OPEN APPROACH: ICD-10-PCS | Performed by: SURGERY

## 2017-03-13 PROCEDURE — 19282 PERQ DEVICE BREAST EA IMAG: CPT

## 2017-03-13 PROCEDURE — 19281 PERQ DEVICE BREAST 1ST IMAG: CPT

## 2017-03-13 RX ORDER — HYDROMORPHONE HYDROCHLORIDE 1 MG/ML
0.6 INJECTION, SOLUTION INTRAMUSCULAR; INTRAVENOUS; SUBCUTANEOUS EVERY 5 MIN PRN
Status: DISCONTINUED | OUTPATIENT
Start: 2017-03-13 | End: 2017-03-13

## 2017-03-13 RX ORDER — METOCLOPRAMIDE 10 MG/1
10 TABLET ORAL ONCE
Status: DISCONTINUED | OUTPATIENT
Start: 2017-03-13 | End: 2017-03-13 | Stop reason: HOSPADM

## 2017-03-13 RX ORDER — MORPHINE SULFATE 4 MG/ML
4 INJECTION, SOLUTION INTRAMUSCULAR; INTRAVENOUS EVERY 10 MIN PRN
Status: DISCONTINUED | OUTPATIENT
Start: 2017-03-13 | End: 2017-03-13

## 2017-03-13 RX ORDER — FAMOTIDINE 20 MG/1
20 TABLET ORAL ONCE
Status: DISCONTINUED | OUTPATIENT
Start: 2017-03-13 | End: 2017-03-13 | Stop reason: HOSPADM

## 2017-03-13 RX ORDER — HYDROCODONE BITARTRATE AND ACETAMINOPHEN 5; 325 MG/1; MG/1
2 TABLET ORAL AS NEEDED
Status: COMPLETED | OUTPATIENT
Start: 2017-03-13 | End: 2017-03-13

## 2017-03-13 RX ORDER — LIDOCAINE HYDROCHLORIDE 10 MG/ML
INJECTION, SOLUTION EPIDURAL; INFILTRATION; INTRACAUDAL; PERINEURAL
Status: DISCONTINUED
Start: 2017-03-13 | End: 2017-03-13

## 2017-03-13 RX ORDER — LIDOCAINE HYDROCHLORIDE AND EPINEPHRINE 10; 10 MG/ML; UG/ML
INJECTION, SOLUTION INFILTRATION; PERINEURAL AS NEEDED
Status: DISCONTINUED | OUTPATIENT
Start: 2017-03-13 | End: 2017-03-13 | Stop reason: HOSPADM

## 2017-03-13 RX ORDER — SODIUM CHLORIDE, SODIUM LACTATE, POTASSIUM CHLORIDE, CALCIUM CHLORIDE 600; 310; 30; 20 MG/100ML; MG/100ML; MG/100ML; MG/100ML
INJECTION, SOLUTION INTRAVENOUS CONTINUOUS
Status: DISCONTINUED | OUTPATIENT
Start: 2017-03-13 | End: 2017-03-13

## 2017-03-13 RX ORDER — MORPHINE SULFATE 10 MG/ML
6 INJECTION, SOLUTION INTRAMUSCULAR; INTRAVENOUS EVERY 10 MIN PRN
Status: DISCONTINUED | OUTPATIENT
Start: 2017-03-13 | End: 2017-03-13

## 2017-03-13 RX ORDER — HYDROMORPHONE HYDROCHLORIDE 1 MG/ML
0.4 INJECTION, SOLUTION INTRAMUSCULAR; INTRAVENOUS; SUBCUTANEOUS EVERY 5 MIN PRN
Status: DISCONTINUED | OUTPATIENT
Start: 2017-03-13 | End: 2017-03-13

## 2017-03-13 RX ORDER — MIDAZOLAM HYDROCHLORIDE 1 MG/ML
INJECTION INTRAMUSCULAR; INTRAVENOUS AS NEEDED
Status: DISCONTINUED | OUTPATIENT
Start: 2017-03-13 | End: 2017-03-13 | Stop reason: SURG

## 2017-03-13 RX ORDER — HYDROCODONE BITARTRATE AND ACETAMINOPHEN 5; 325 MG/1; MG/1
2 TABLET ORAL AS NEEDED
Status: DISCONTINUED | OUTPATIENT
Start: 2017-03-13 | End: 2017-03-13

## 2017-03-13 RX ORDER — HALOPERIDOL 5 MG/ML
0.25 INJECTION INTRAMUSCULAR ONCE AS NEEDED
Status: DISCONTINUED | OUTPATIENT
Start: 2017-03-13 | End: 2017-03-13

## 2017-03-13 RX ORDER — LIDOCAINE HYDROCHLORIDE 10 MG/ML
5 INJECTION, SOLUTION EPIDURAL; INFILTRATION; INTRACAUDAL; PERINEURAL ONCE
Status: COMPLETED | OUTPATIENT
Start: 2017-03-13 | End: 2017-03-13

## 2017-03-13 RX ORDER — EPHEDRINE SULFATE 50 MG/ML
INJECTION, SOLUTION INTRAVENOUS AS NEEDED
Status: DISCONTINUED | OUTPATIENT
Start: 2017-03-13 | End: 2017-03-13 | Stop reason: SURG

## 2017-03-13 RX ORDER — LIDOCAINE HYDROCHLORIDE 10 MG/ML
INJECTION, SOLUTION EPIDURAL; INFILTRATION; INTRACAUDAL; PERINEURAL
Status: DISCONTINUED
Start: 2017-03-13 | End: 2017-03-13 | Stop reason: WASHOUT

## 2017-03-13 RX ORDER — LIDOCAINE HYDROCHLORIDE 10 MG/ML
INJECTION, SOLUTION EPIDURAL; INFILTRATION; INTRACAUDAL; PERINEURAL AS NEEDED
Status: DISCONTINUED | OUTPATIENT
Start: 2017-03-13 | End: 2017-03-13 | Stop reason: SURG

## 2017-03-13 RX ORDER — BUPIVACAINE HYDROCHLORIDE 5 MG/ML
INJECTION, SOLUTION EPIDURAL; INTRACAUDAL AS NEEDED
Status: DISCONTINUED | OUTPATIENT
Start: 2017-03-13 | End: 2017-03-13 | Stop reason: HOSPADM

## 2017-03-13 RX ORDER — DEXAMETHASONE SODIUM PHOSPHATE 4 MG/ML
VIAL (ML) INJECTION AS NEEDED
Status: DISCONTINUED | OUTPATIENT
Start: 2017-03-13 | End: 2017-03-13 | Stop reason: SURG

## 2017-03-13 RX ORDER — ONDANSETRON 2 MG/ML
4 INJECTION INTRAMUSCULAR; INTRAVENOUS ONCE AS NEEDED
Status: DISCONTINUED | OUTPATIENT
Start: 2017-03-13 | End: 2017-03-13

## 2017-03-13 RX ORDER — NALOXONE HYDROCHLORIDE 0.4 MG/ML
80 INJECTION, SOLUTION INTRAMUSCULAR; INTRAVENOUS; SUBCUTANEOUS AS NEEDED
Status: DISCONTINUED | OUTPATIENT
Start: 2017-03-13 | End: 2017-03-13

## 2017-03-13 RX ORDER — ACETAMINOPHEN 325 MG/1
650 TABLET ORAL ONCE
Status: COMPLETED | OUTPATIENT
Start: 2017-03-13 | End: 2017-03-13

## 2017-03-13 RX ORDER — HYDROMORPHONE HYDROCHLORIDE 1 MG/ML
0.2 INJECTION, SOLUTION INTRAMUSCULAR; INTRAVENOUS; SUBCUTANEOUS EVERY 5 MIN PRN
Status: DISCONTINUED | OUTPATIENT
Start: 2017-03-13 | End: 2017-03-13

## 2017-03-13 RX ORDER — MORPHINE SULFATE 2 MG/ML
2 INJECTION, SOLUTION INTRAMUSCULAR; INTRAVENOUS EVERY 10 MIN PRN
Status: DISCONTINUED | OUTPATIENT
Start: 2017-03-13 | End: 2017-03-13

## 2017-03-13 RX ORDER — HYDROCODONE BITARTRATE AND ACETAMINOPHEN 5; 325 MG/1; MG/1
1 TABLET ORAL AS NEEDED
Status: DISCONTINUED | OUTPATIENT
Start: 2017-03-13 | End: 2017-03-13

## 2017-03-13 RX ORDER — HYDROCODONE BITARTRATE AND ACETAMINOPHEN 5; 325 MG/1; MG/1
1 TABLET ORAL AS NEEDED
Status: COMPLETED | OUTPATIENT
Start: 2017-03-13 | End: 2017-03-13

## 2017-03-13 RX ORDER — HYDROCODONE BITARTRATE AND ACETAMINOPHEN 5; 325 MG/1; MG/1
1-2 TABLET ORAL EVERY 6 HOURS PRN
Qty: 40 TABLET | Refills: 0 | Status: SHIPPED | OUTPATIENT
Start: 2017-03-13 | End: 2017-05-01

## 2017-03-13 RX ORDER — CLINDAMYCIN PHOSPHATE 900 MG/50ML
900 INJECTION INTRAVENOUS EVERY 8 HOURS
Status: DISCONTINUED | OUTPATIENT
Start: 2017-03-13 | End: 2017-03-13 | Stop reason: HOSPADM

## 2017-03-13 RX ADMIN — LIDOCAINE HYDROCHLORIDE 50 MG: 10 INJECTION, SOLUTION EPIDURAL; INFILTRATION; INTRACAUDAL; PERINEURAL at 10:23:00

## 2017-03-13 RX ADMIN — SODIUM CHLORIDE, SODIUM LACTATE, POTASSIUM CHLORIDE, CALCIUM CHLORIDE: 600; 310; 30; 20 INJECTION, SOLUTION INTRAVENOUS at 11:40:00

## 2017-03-13 RX ADMIN — EPHEDRINE SULFATE 10 MG: 50 INJECTION, SOLUTION INTRAVENOUS at 10:35:00

## 2017-03-13 RX ADMIN — MIDAZOLAM HYDROCHLORIDE 2 MG: 1 INJECTION INTRAMUSCULAR; INTRAVENOUS at 10:20:00

## 2017-03-13 RX ADMIN — DEXAMETHASONE SODIUM PHOSPHATE 4 MG: 4 MG/ML VIAL (ML) INJECTION at 10:23:00

## 2017-03-13 NOTE — ANESTHESIA POSTPROCEDURE EVALUATION
Patient: Shannan Montalvo    Procedure Summary     Date Anesthesia Start Anesthesia Stop Room / Location    03/13/17 1020  [facility] MAIN OR 02 / [facility] MAIN OR       Procedure Diagnosis Surgeon Responsible Provider    PARTIAL MASTECTOMY W/ SENTINEL LYMPH NODE

## 2017-03-13 NOTE — OPERATIVE REPORT
[facility]    PATIENT'S NAME: Karson To MICHELLE   ATTENDING PHYSICIAN: Charbel Mcdaniels. Rachel Curiel MD   OPERATING PHYSICIAN: Charbel Mcdaniels.  Rachel Curiel MD   PATIENT ACCOUNT#:   [id number]    LOCATION:  [address]  MEDICAL RECORD #:   [id number] biopsy-proven metastatic lymph node.   We therefore discussed the plan for a right breast wire localized partial mastectomy with right wire localization of the axillary lymph node, right sentinel lymph node biopsy, and possible right axillary lymph node dis analysis.   Further inspection with the gamma counter did reveal 1 additional sentinel node, which was removed with electrocautery and sent for frozen section analysis; the results of which came back as negative for the presence of residual viable disease i approximately 25 sq cm was left in place and therefore, an advancement flap mastopexy was required in order to close this defect and promote optimal healing.   This required a counter incision be placed through the superior aspect of the breast tissue down

## 2017-03-13 NOTE — ANESTHESIA PREPROCEDURE EVALUATION
Anesthesia PreOp Note    HPI:     Jacinto Singh is a 63year old female who presents for preoperative consultation requested by: Jessica Martinez MD    Date of Surgery: 3/13/2017    Procedure(s):  PARTIAL MASTECTOMY W/ SENTINEL LYMPH NODE BIOPSY time   Levothyroxine Sodium 112 MCG Oral Tab Take 112 mcg by mouth before breakfast. Disp:  Rfl:  3/13/2017 at Unknown time       Current Facility-Administered Medications Ordered in Epic:  lactated ringers infusion  Intravenous Continuous Laura Motley CREATSERUM 0.59 03/07/2017   GLU 99 03/07/2017   CA 9.1 03/07/2017       Lab Results  Component Value Date   INR 1.0 03/07/2017       Vital Signs: Body mass index is 26 kg/(m^2). height is 1.676 m (5' 6\") and weight is 73. 029 kg (161 lb).  Her oral te

## 2017-03-13 NOTE — IMAGING NOTE
PT TO MAMMO/US DEPT SCOUTS COMPLETED  AT 0725    HX TAKEN PROCEDURE EXPLAINED ORDERED VERFIED AND INITIALED BY ALL STAFF MEMBERS CONSENT OBTAINED IN PREOP/REVIEWED AND NOTED     DR Hernandez 67 COMPLETED     0745 TIME OUT TAKEN    SITE MARKED 030

## 2017-03-13 NOTE — H&P
History of Present Illness:    Ms. Manny Martinez is a 63year old woman who presents with a right imaging detected breast cancer in September 2016. She was referred for diagnostic imaging which is detailed below.  She denies any palpable masses, nipp   OTHER          Comment  excision of lipoma at L arm and lower back      Gynecological History:  Pt is nulliparous. She achieved menarche at age 17    Age of Menopause: 49's  Type: Natural  She denies any history of hormone replacement therapy.   She de ancestry. Social History:    Alcohol Use:  Yes  0.6 oz/week  1 Glasses of wine per week        Smoking status:  Current Some Day Smoker  0.50 Packs/Day  For 30.00 Years    Types:  Cigarettes    Smokeless tobacco:  Never Used    The patient is . moles.     Hematologic/Lymphatic:  The patient denies easily bruising or bleeding or persistent swollen glands or lymph nodes. Musculoskeletal:  The patient denies muscle aches/pain, joint pain, stiff joints, neck pain, back pain or bone pain.     Neuro The skin, nipple, and areola appear normal. There is no skin dimpling with movement of the pectoralis. There is no nipple retraction. No nipple discharge can be elicited. The parenchyma is mildly nodular.  There are no dominant masses in the breast. The axi staging was also described, including the technique, risks and benefits of sentinel node biopsy, the possible need for axillary dissection, and the long-term sequelae of this procedure.     I explained that in light of the multi-focal large disease, with th alternatives and risks related to not receiving this procedure. We will proceed with procedure as planned.

## 2017-03-13 NOTE — PROCEDURES
[facility]OSP - [facility]  Procedure Note    [facility]:  [facility]    1955 MRN [id number]   Location [place] [address] Attending Lita Bailey MD   Hosp Day # 0 PCP Chely Canales MD     Procedure: Reji Loo

## 2017-03-13 NOTE — PROGRESS NOTES
Met with patient while in Nuclear Medicine. Provided patient with post operative breast pillow. Emotional support provided to patient. She is aware of her post operative follow up with Dr. Gisselle Ahumada and Dr. Kieran Christina.   Encouraged patient to contact my office wit

## 2017-03-15 ENCOUNTER — TELEPHONE (OUTPATIENT)
Dept: SURGERY | Facility: CLINIC | Age: 62
End: 2017-03-15

## 2017-03-15 ENCOUNTER — APPOINTMENT (OUTPATIENT)
Dept: HEMATOLOGY/ONCOLOGY | Facility: HOSPITAL | Age: 62
End: 2017-03-15
Attending: INTERNAL MEDICINE
Payer: MEDICAID

## 2017-03-15 NOTE — TELEPHONE ENCOUNTER
I spoke with the patient regarding her complete pathologic response seen on surgical pathology.  She is feeling well and will see me next week for a clinical exam.

## 2017-03-17 NOTE — PROCEDURES
[facility]  Procedure Note    Arthuro Spearing Patient Status:  Hospital Outpatient Surgery    1955 MRN [id number]   Location [address] [facility] Attending No att. providers found   Hosp Day # 0 PCP Trip Isabel,

## 2017-03-22 ENCOUNTER — OFFICE VISIT (OUTPATIENT)
Dept: SURGERY | Facility: CLINIC | Age: 62
End: 2017-03-22

## 2017-03-22 ENCOUNTER — OFFICE VISIT (OUTPATIENT)
Dept: HEMATOLOGY/ONCOLOGY | Facility: HOSPITAL | Age: 62
End: 2017-03-22
Attending: INTERNAL MEDICINE
Payer: MEDICAID

## 2017-03-22 ENCOUNTER — TELEPHONE (OUTPATIENT)
Dept: INTERNAL MEDICINE CLINIC | Facility: CLINIC | Age: 62
End: 2017-03-22

## 2017-03-22 VITALS
DIASTOLIC BLOOD PRESSURE: 63 MMHG | SYSTOLIC BLOOD PRESSURE: 123 MMHG | HEART RATE: 71 BPM | BODY MASS INDEX: 26.03 KG/M2 | HEIGHT: 66 IN | WEIGHT: 162 LBS

## 2017-03-22 VITALS
BODY MASS INDEX: 26.03 KG/M2 | HEART RATE: 67 BPM | DIASTOLIC BLOOD PRESSURE: 79 MMHG | SYSTOLIC BLOOD PRESSURE: 160 MMHG | TEMPERATURE: 99 F | WEIGHT: 162 LBS | RESPIRATION RATE: 18 BRPM | HEIGHT: 66 IN

## 2017-03-22 DIAGNOSIS — E03.9 ACQUIRED HYPOTHYROIDISM: ICD-10-CM

## 2017-03-22 DIAGNOSIS — R92.2 DENSE BREAST: ICD-10-CM

## 2017-03-22 DIAGNOSIS — C50.411 BREAST CANCER OF UPPER-OUTER QUADRANT OF RIGHT FEMALE BREAST (HCC): ICD-10-CM

## 2017-03-22 DIAGNOSIS — C50.411 BREAST CANCER OF UPPER-OUTER QUADRANT OF RIGHT FEMALE BREAST (HCC): Primary | ICD-10-CM

## 2017-03-22 DIAGNOSIS — Z09 CHEMOTHERAPY FOLLOW-UP EXAMINATION: Primary | ICD-10-CM

## 2017-03-22 DIAGNOSIS — Z09 CHEMOTHERAPY FOLLOW-UP EXAMINATION: ICD-10-CM

## 2017-03-22 DIAGNOSIS — C50.411 MALIGNANT NEOPLASM OF UPPER-OUTER QUADRANT OF RIGHT FEMALE BREAST (HCC): Primary | ICD-10-CM

## 2017-03-22 PROCEDURE — 99215 OFFICE O/P EST HI 40 MIN: CPT | Performed by: INTERNAL MEDICINE

## 2017-03-22 PROCEDURE — 99024 POSTOP FOLLOW-UP VISIT: CPT | Performed by: SURGERY

## 2017-03-22 PROCEDURE — 96413 CHEMO IV INFUSION 1 HR: CPT

## 2017-03-22 RX ORDER — 0.9 % SODIUM CHLORIDE 0.9 %
VIAL (ML) INJECTION
Status: DISCONTINUED
Start: 2017-03-22 | End: 2017-03-22

## 2017-03-22 RX ORDER — HEPARIN SODIUM (PORCINE) LOCK FLUSH IV SOLN 100 UNIT/ML 100 UNIT/ML
5 SOLUTION INTRAVENOUS ONCE
Status: COMPLETED | OUTPATIENT
Start: 2017-03-22 | End: 2017-03-22

## 2017-03-22 RX ORDER — HEPARIN SODIUM (PORCINE) LOCK FLUSH IV SOLN 100 UNIT/ML 100 UNIT/ML
SOLUTION INTRAVENOUS
Status: COMPLETED
Start: 2017-03-22 | End: 2017-03-22

## 2017-03-22 RX ORDER — SODIUM CHLORIDE 9 MG/ML
INJECTION, SOLUTION INTRAVENOUS
Status: DISCONTINUED
Start: 2017-03-22 | End: 2017-03-22

## 2017-03-22 RX ADMIN — HEPARIN SODIUM (PORCINE) LOCK FLUSH IV SOLN 100 UNIT/ML 500 UNITS: 100 SOLUTION INTRAVENOUS at 13:38:00

## 2017-03-22 NOTE — PROGRESS NOTES
Patient arrives for C8 D1 of Herceptin. Patient arrives after MD apt. Patient is a week late due to having a mastectomy last Tuesday 3/14/17.  Patient state she is doing well after surgery, reports she has not needed to take any pain medication and the only

## 2017-03-22 NOTE — PROGRESS NOTES
DAR Garcia is a 63year old female who is here today for follow up of Breast cancer of upper-outer quadrant of right female breast (hcc)  (primary encounter diagnosis)  Chemotherapy follow-up examination       Pt here for follow up. Allergic/Immunologic: Positive for environmental allergies. Neurological: Positive for numbness (numbness and tingling to fingertips and toes ). Negative for dizziness, weakness, light-headedness and headaches. Hematological: Negative for adenopathy.  D Bradley        Social History Main Topics   Smoking status: Current Some Day Smoker  0.50 Packs/Day  For 30.00 Years     Types: Cigarettes    Smokeless tobacco: Never Used    Alcohol Use: No    Drug Use: No    Sexual Activity: Not on file   Not o Abdominal: Soft. Bowel sounds are normal. She exhibits no distension and no mass. There is no hepatosplenomegaly. There is no tenderness. There is no rebound and no guarding. Musculoskeletal: She exhibits no edema or tenderness.    Lymphadenopathy: No orders of the defined types were placed in this encounter. Results From Past 48 Hours:  No results found for this or any previous visit (from the past 48 hour(s)).       Imaging & Referrals:  None   No orders of the defined types were placed in [facility] · Lymph node with metallic clip and wire localization demonstrating areas of fibrosis and vascular proliferation. · No evidence of residual malignancy is identified.   · Immunohistochemical stain for cytokeratin AE1/AE3 is negative, supporting the above di · No evidence of malignancy is seen. · All specimen inked margins are free of malignancy. I. Right breast; reexcision of inferior margin:  · Benign breast tissue with microcyst formation, dense stromal fibrosis, and multifocal microcalcification.   · No

## 2017-03-24 NOTE — PROGRESS NOTES
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 She denies fevers, chills, erythema or drainage from her incisions. She is here today for evaluation and recommendations for further therapy.         Past Medical History   Diagnosis Date   • Cataract      Left eye   • Breast cancer (H Rash    Family History:   Family History   Problem Relation Age of Onset   • parkinson's disease[other] [OTHER] Mother    • Diabetes Mother    • Heart Disorder Mother      pacmaker   • ALS[other] [OTHER] Father    She is not of Ashkenazi Orthodoxy ancestry. incontinence, decreased urine stream, blood in the urine or vaginal/penile discharge. Skin:    The patient denies rash, itching, skin lesions, dry skin, change in skin color or change in moles.      Hematologic/Lymphatic:  The patient denies easily bruis completing Herceptin per Dr. Cyril Lewis. She will meet with Radiation oncology to augment loco-regional control. She will return to see me in 2 weeks for a wound check.  I encouraged her to continue monitoring her ROM and strength and explained that a referral to

## 2017-03-30 ENCOUNTER — OFFICE VISIT (OUTPATIENT)
Dept: RADIATION ONCOLOGY | Facility: HOSPITAL | Age: 62
End: 2017-03-30
Attending: RADIOLOGY
Payer: MEDICAID

## 2017-03-30 ENCOUNTER — TELEPHONE (OUTPATIENT)
Dept: HEMATOLOGY/ONCOLOGY | Facility: HOSPITAL | Age: 62
End: 2017-03-30

## 2017-03-30 VITALS
HEIGHT: 66 IN | WEIGHT: 160.81 LBS | HEART RATE: 67 BPM | DIASTOLIC BLOOD PRESSURE: 65 MMHG | SYSTOLIC BLOOD PRESSURE: 114 MMHG | BODY MASS INDEX: 25.84 KG/M2 | RESPIRATION RATE: 16 BRPM

## 2017-03-30 PROCEDURE — 99212 OFFICE O/P EST SF 10 MIN: CPT

## 2017-03-30 NOTE — PROGRESS NOTES
Nursing Consultation Note  Patient: Marta Nunez  YOB: 1955  Age: 63year old  Radiation Oncologist: Dr. Hallie Higgins  Referring Physician: Loren Roper  Diagnosis:No diagnosis found.   Consult Date: 3/30/2017      Chemotherapy: yes  L capsule (100 mg total) by mouth nightly. Taper as directed by MD Disp: 60 capsule Rfl: 0   Rosuvastatin Calcium 10 MG Oral Tab Take 10 mg by mouth nightly. Disp:  Rfl:    Vitamin D3 1000 UNITS Oral Tab Take 1,000 Units by mouth daily.  Disp:  Rfl:    Dario Education:  y    Are ADL's met? Yes  Does patient feel safe in their environment? Yes  Care decisions:  Patient and/or surrogate IS involved in care decisions. Advanced directives:  Patient DOES NOT have advanced directives.   Transportation:  [facility]

## 2017-03-30 NOTE — PROGRESS NOTES
Primary language:  [place]  Language line required?  no  Comprehension Ability:  excellent  Able to read?  yes  Able to write? yes  Communication tools:  na  Patient's ability to learn:  excellent  Readiness to learn:   Motivated  Learning preferences:  Dis

## 2017-03-30 NOTE — TELEPHONE ENCOUNTER
Pt called and 4/12/17 appointment cancelled due to has a 5/3/17 appointment. Pt will call Radiation tomorrow to schedule appointment. Pt verbalizes understanding.

## 2017-03-30 NOTE — TELEPHONE ENCOUNTER
Rudolph Johnson called and she is wondering if her April 12th doctor visit should be canceled. When she seen Genevieve Fabiana on March 22nd she said she would now she her every 6 weeks which is every other cycle. So that means she wouldn't need to be seen until May.  Akash

## 2017-03-30 NOTE — PROGRESS NOTES
RADIATION ONCOLOGY NOTE    DATE OF VISIT: 3/30/2017    DIAGNOSIS :  Lymph node positive, Her2 positive Right breast cancer, Stage IIA (T1c(m), N1, M0), s/p neoadjuvant herceptin based chemotherapy, TCPH with treatment response, with pathologic CR, s/p lymp f/u MRI on February 9, 2017 which showed no residual enhancement in the Right breast and a decrease in size of the Right axillary lymph node. ROS    A 12 Point review of system was obtained and is as above and per HPI and nursing note.       Current O drink alcohol or use illicit drugs. PAST FAMILY HISTORY   family history includes ALS in her father; Diabetes in her mother; Heart Disorder in her mother; parkinson's disease in her mother. PHYSICAL EXAM  Blood pressure 114/65, pulse 67, resp.  rate 1 and all risk were discussed and all questions were answered. At this time the patient would like to proceed with a course of treatment. Therefore, I will plan a XRT mapping session shortly and will keep you updated.   Thank you for allowing me to take car long axis lymph node was sampled (FNA--Hydromark:  Metastatic adenocarcinoma).    Receptor status from the masses that were cored was reported as ER WI negative HER-2 positive      TECHNIQUE:     Breast MRI was performed using dynamic intravenous gadolinium 207 image 116). In the retroareolar breast, perhaps slightly inner, located 5.2 cm posterior to nipple is a 1.1 x 0.4 x 0.4 cm linear area of non-mass enhancement.  It is subthreshold for kinetic analysis but avidly enhances and is suspicious for malig linear areas of non-mass enhancement are seen in centered in the upper outer and retroareolar right breast. In conglomeration, these areas altogether measure 4.5 x    2.7 x 4.8 cm.   Several enlarged right axillary and right subpectoral lymph nodes are pres D) - Breast, right, 3.) right breast anterior margin,clip marks true margin                              E) - Breast, right, 4.) right breast deep margin, clip marks true margin                                 F) - Breast, right, 5.) right breast medial ma evidence of malignancy is seen. · No evidence of malignancy is seen. · All specimen inked margins are free of malignancy.     F. Right breast; reexcision of medial margin:  · Benign breast tissue with stromal fibrosis, focal foreign body giant cell reacti sentinel lymph nodes from the right axilla are received. Both lymph nodes were negative for metastatic carcinoma on H&E sections.   Immunohistochemical analysis for cytokeratin AE1/AE3 and all sections of the lymph nodes (A1, A2, B1, and B2) was performed LOCATION:   Right axillary lymph node measuring approximately 0.83 0.6 x 0.8 cm. Clip is identified. [place] [facility] MEDICATION:   Superficial and deep buffered 1% lidocaine. COMPLICATIONS:         None.          Dictated by (CST): Jasmyn Maldonado M.D. on 3/13 enhancement. FINDINGS:          RIGHT BREAST: Curvilinear T2 hyperintensity seen in the upper central right breast is seen at the site of the [facility] clip (3/28). There is no suspicious surrounding enhancement.  Susceptibility artifact from the coil breast.      BI-RADS CATEGORY:       DIAGNOSTIC CATEGORY 6--KNOWN BIOPSY PROVEN MALIGNANCY: RIGHT BREAST. RECOMMENDATIONS:    SURGICAL CONSULTATION. PLEASE NOTE:  A NORMAL MRI DOES NOT EXCLUDE THE POSSIBILITY OF BREAST CANCER.   A CLINICALLY

## 2017-03-30 NOTE — PROGRESS NOTES
Skin Plan Of Care:    Skin Problem:  Potential for impaired skin integrity    Problems related to:    Side effects of radiation therapy    Interventions:  Assess skin Instruct patient on skin care Instruct patient on basix skin care measures during radiati

## 2017-03-31 ENCOUNTER — TELEPHONE (OUTPATIENT)
Dept: HEMATOLOGY/ONCOLOGY | Facility: HOSPITAL | Age: 62
End: 2017-03-31

## 2017-03-31 NOTE — PROCEDURES
[facility] HOSP - [facility]  Procedure Note    Nancy Yashira Patient Status:  Hospital Outpatient Surgery    1955 MRN [id number]   Location [address] [facility] Attending No att. providers found   Hosp Day # 0 PCP Rosita Armstrong,

## 2017-04-01 ENCOUNTER — APPOINTMENT (OUTPATIENT)
Dept: RADIATION ONCOLOGY | Facility: HOSPITAL | Age: 62
End: 2017-04-01
Attending: RADIOLOGY
Payer: MEDICAID

## 2017-04-05 ENCOUNTER — HOSPITAL ENCOUNTER (OUTPATIENT)
Facility: HOSPITAL | Age: 62
Discharge: HOME | End: 2017-04-05
Attending: SURGERY
Payer: MEDICAID

## 2017-04-05 ENCOUNTER — TELEPHONE (OUTPATIENT)
Dept: INTERNAL MEDICINE CLINIC | Facility: CLINIC | Age: 62
End: 2017-04-05

## 2017-04-05 ENCOUNTER — OFFICE VISIT (OUTPATIENT)
Dept: SURGERY | Facility: CLINIC | Age: 62
End: 2017-04-05

## 2017-04-05 VITALS — HEART RATE: 68 BPM | SYSTOLIC BLOOD PRESSURE: 145 MMHG | HEIGHT: 66 IN | DIASTOLIC BLOOD PRESSURE: 77 MMHG

## 2017-04-05 DIAGNOSIS — C50.411 BREAST CANCER OF UPPER-OUTER QUADRANT OF RIGHT FEMALE BREAST (HCC): Primary | ICD-10-CM

## 2017-04-05 DIAGNOSIS — Z09 CHEMOTHERAPY FOLLOW-UP EXAMINATION: ICD-10-CM

## 2017-04-05 PROCEDURE — 99024 POSTOP FOLLOW-UP VISIT: CPT | Performed by: SURGERY

## 2017-04-05 PROCEDURE — 99211 OFF/OP EST MAY X REQ PHY/QHP: CPT | Performed by: SURGERY

## 2017-04-05 NOTE — PROGRESS NOTES
Follow up: 2 week follow up for wound check.     Education Record    Learner:  Patient    Disease / Diagnosis: Wound check     Barriers / Limitations:  None   Comments:    Method:  Discussion   Comments:    General Topics:  Plan of care reviewed   Comments:

## 2017-04-11 ENCOUNTER — OFFICE VISIT (OUTPATIENT)
Dept: PHYSICAL THERAPY | Facility: HOSPITAL | Age: 62
End: 2017-04-11
Payer: MEDICAID

## 2017-04-11 ENCOUNTER — OFFICE VISIT (OUTPATIENT)
Dept: HEMATOLOGY/ONCOLOGY | Facility: HOSPITAL | Age: 62
End: 2017-04-11
Attending: INTERNAL MEDICINE
Payer: MEDICAID

## 2017-04-11 VITALS
SYSTOLIC BLOOD PRESSURE: 165 MMHG | RESPIRATION RATE: 16 BRPM | TEMPERATURE: 98 F | DIASTOLIC BLOOD PRESSURE: 75 MMHG | BODY MASS INDEX: 26 KG/M2 | HEART RATE: 58 BPM | WEIGHT: 158.19 LBS

## 2017-04-11 DIAGNOSIS — C50.411 BREAST CANCER OF UPPER-OUTER QUADRANT OF RIGHT FEMALE BREAST (HCC): Primary | ICD-10-CM

## 2017-04-11 PROCEDURE — 77332 RADIATION TREATMENT AID(S): CPT | Performed by: RADIOLOGY

## 2017-04-11 PROCEDURE — 77334 RADIATION TREATMENT AID(S): CPT | Performed by: RADIOLOGY

## 2017-04-11 PROCEDURE — 77290 THER RAD SIMULAJ FIELD CPLX: CPT | Performed by: RADIOLOGY

## 2017-04-11 PROCEDURE — 96413 CHEMO IV INFUSION 1 HR: CPT

## 2017-04-11 RX ORDER — 0.9 % SODIUM CHLORIDE 0.9 %
VIAL (ML) INJECTION
Status: DISCONTINUED
Start: 2017-04-11 | End: 2017-04-11

## 2017-04-11 RX ORDER — SODIUM CHLORIDE 9 MG/ML
INJECTION, SOLUTION INTRAVENOUS
Status: DISCONTINUED
Start: 2017-04-11 | End: 2017-04-11

## 2017-04-11 RX ORDER — HEPARIN SODIUM (PORCINE) LOCK FLUSH IV SOLN 100 UNIT/ML 100 UNIT/ML
5 SOLUTION INTRAVENOUS ONCE
Status: COMPLETED | OUTPATIENT
Start: 2017-04-11 | End: 2017-04-11

## 2017-04-11 RX ORDER — HEPARIN SODIUM (PORCINE) LOCK FLUSH IV SOLN 100 UNIT/ML 100 UNIT/ML
SOLUTION INTRAVENOUS
Status: DISCONTINUED
Start: 2017-04-11 | End: 2017-04-11

## 2017-04-11 RX ADMIN — HEPARIN SODIUM (PORCINE) LOCK FLUSH IV SOLN 100 UNIT/ML 500 UNITS: 100 SOLUTION INTRAVENOUS at 14:05:00

## 2017-04-11 NOTE — PROGRESS NOTES
BREAST CANCER SURGICAL SCREENINGS  [facility]      PATIENT SUMMARY:     Involved Side:          RIGHT                                           Dominant Hand:        RIGHT                          Occupation:                     Inter ext 60 60   ext     ext     Functional IR  T5 T5  Functional IR     Functional IR                      Shoulder strength  Right Left  Shoulder strength  Right Left  Shoulder strength  Right Left    flex 5 5   flex     flex      abd 5 5   abd     abd      e STARTING POINT 15cm from 3rd cuticle 15cm from 3rd cuticle   RIGHT HAND VOLUME 310 310   LEFT HAND VOLUME 315 315   MEASUREMENT A 17 17   MEASUREMENT B 16.6 16.4   MEASUREMENT C 19.1 18.5   MEASUREMENT D 23.4 22.8   MEASUREMENT E 24.5 24.7   MEASUREMENT

## 2017-04-11 NOTE — PROGRESS NOTES
Pt here for C9 Herceptin. She just had mapping completed & after this visit, she'll be headed to physical therapy at [phone]. She is drinking fluids presently, denies any complaints/concerns. PN tingling to fingers, nail changes.  Denies n/v, fever/chills,

## 2017-04-12 ENCOUNTER — APPOINTMENT (OUTPATIENT)
Dept: HEMATOLOGY/ONCOLOGY | Facility: HOSPITAL | Age: 62
End: 2017-04-12
Attending: INTERNAL MEDICINE
Payer: MEDICAID

## 2017-04-12 PROCEDURE — 77334 RADIATION TREATMENT AID(S): CPT | Performed by: RADIOLOGY

## 2017-04-12 PROCEDURE — 77300 RADIATION THERAPY DOSE PLAN: CPT | Performed by: RADIOLOGY

## 2017-04-12 PROCEDURE — 77295 3-D RADIOTHERAPY PLAN: CPT | Performed by: RADIOLOGY

## 2017-04-17 ENCOUNTER — NURSE NAVIGATOR ENCOUNTER (OUTPATIENT)
Dept: HEMATOLOGY/ONCOLOGY | Facility: HOSPITAL | Age: 62
End: 2017-04-17

## 2017-04-17 PROCEDURE — 77307 TELETHX ISODOSE PLAN CPLX: CPT | Performed by: RADIOLOGY

## 2017-04-17 PROCEDURE — 77290 THER RAD SIMULAJ FIELD CPLX: CPT | Performed by: RADIOLOGY

## 2017-04-17 PROCEDURE — 77334 RADIATION TREATMENT AID(S): CPT | Performed by: RADIOLOGY

## 2017-04-18 ENCOUNTER — OFFICE VISIT (OUTPATIENT)
Dept: RADIATION ONCOLOGY | Facility: HOSPITAL | Age: 62
End: 2017-04-18
Attending: RADIOLOGY
Payer: MEDICAID

## 2017-04-18 VITALS
DIASTOLIC BLOOD PRESSURE: 72 MMHG | RESPIRATION RATE: 16 BRPM | BODY MASS INDEX: 25.39 KG/M2 | HEIGHT: 66 IN | SYSTOLIC BLOOD PRESSURE: 165 MMHG | WEIGHT: 158 LBS | HEART RATE: 64 BPM

## 2017-04-18 DIAGNOSIS — C50.411 BREAST CANCER OF UPPER-OUTER QUADRANT OF RIGHT FEMALE BREAST (HCC): Primary | ICD-10-CM

## 2017-04-18 PROCEDURE — 77412 RADIATION TX DELIVERY LVL 3: CPT | Performed by: RADIOLOGY

## 2017-04-18 PROCEDURE — 77280 THER RAD SIMULAJ FIELD SMPL: CPT | Performed by: RADIOLOGY

## 2017-04-18 NOTE — PROGRESS NOTES
[facility] Radiation Treatment Management Note 1-5    Patient:  Ari Ovalles  Age:  63year old  Visit Diagnosis:    1.  Breast cancer of upper-outer quadrant of right female breast [facility])      Primary Rad/Onc:  Dr. Minal Pate

## 2017-04-19 PROCEDURE — 77412 RADIATION TX DELIVERY LVL 3: CPT | Performed by: RADIOLOGY

## 2017-04-19 PROCEDURE — 77387 GUIDANCE FOR RADJ TX DLVR: CPT | Performed by: RADIOLOGY

## 2017-04-20 PROCEDURE — 77412 RADIATION TX DELIVERY LVL 3: CPT | Performed by: RADIOLOGY

## 2017-04-20 PROCEDURE — 77387 GUIDANCE FOR RADJ TX DLVR: CPT | Performed by: RADIOLOGY

## 2017-04-21 PROCEDURE — 77387 GUIDANCE FOR RADJ TX DLVR: CPT | Performed by: RADIOLOGY

## 2017-04-21 PROCEDURE — 77336 RADIATION PHYSICS CONSULT: CPT | Performed by: RADIOLOGY

## 2017-04-21 PROCEDURE — 77412 RADIATION TX DELIVERY LVL 3: CPT | Performed by: RADIOLOGY

## 2017-04-24 ENCOUNTER — OFFICE VISIT (OUTPATIENT)
Dept: RADIATION ONCOLOGY | Facility: HOSPITAL | Age: 62
End: 2017-04-24
Attending: RADIOLOGY
Payer: MEDICAID

## 2017-04-24 VITALS
BODY MASS INDEX: 25.39 KG/M2 | RESPIRATION RATE: 16 BRPM | DIASTOLIC BLOOD PRESSURE: 99 MMHG | HEIGHT: 66 IN | HEART RATE: 62 BPM | SYSTOLIC BLOOD PRESSURE: 155 MMHG | WEIGHT: 158 LBS

## 2017-04-24 DIAGNOSIS — C50.411 BREAST CANCER OF UPPER-OUTER QUADRANT OF RIGHT FEMALE BREAST (HCC): Primary | ICD-10-CM

## 2017-04-24 PROCEDURE — 77387 GUIDANCE FOR RADJ TX DLVR: CPT | Performed by: RADIOLOGY

## 2017-04-24 PROCEDURE — 77412 RADIATION TX DELIVERY LVL 3: CPT | Performed by: RADIOLOGY

## 2017-04-24 NOTE — PROGRESS NOTES
[facility] Radiation Treatment Management Note 1-5    Patient:  Malorie Becerril  Age:  63year old  Visit Diagnosis:    1.  Breast cancer of upper-outer quadrant of right female breast [facility])      Primary Rad/Onc:  Dr. Dao Barksdale

## 2017-04-25 PROCEDURE — 77412 RADIATION TX DELIVERY LVL 3: CPT | Performed by: RADIOLOGY

## 2017-04-25 PROCEDURE — 77387 GUIDANCE FOR RADJ TX DLVR: CPT | Performed by: RADIOLOGY

## 2017-04-26 PROCEDURE — 77412 RADIATION TX DELIVERY LVL 3: CPT | Performed by: RADIOLOGY

## 2017-04-26 PROCEDURE — 77387 GUIDANCE FOR RADJ TX DLVR: CPT | Performed by: RADIOLOGY

## 2017-04-27 ENCOUNTER — HOSPITAL ENCOUNTER (OUTPATIENT)
Dept: CV DIAGNOSTICS | Facility: HOSPITAL | Age: 62
Discharge: HOME | End: 2017-04-27
Attending: INTERNAL MEDICINE
Payer: MEDICAID

## 2017-04-27 DIAGNOSIS — C50.411 BREAST CANCER OF UPPER-OUTER QUADRANT OF RIGHT FEMALE BREAST (HCC): ICD-10-CM

## 2017-04-27 DIAGNOSIS — Z09 CHEMOTHERAPY FOLLOW-UP EXAMINATION: ICD-10-CM

## 2017-04-27 PROCEDURE — 77412 RADIATION TX DELIVERY LVL 3: CPT | Performed by: RADIOLOGY

## 2017-04-27 PROCEDURE — 93306 TTE W/DOPPLER COMPLETE: CPT | Performed by: INTERNAL MEDICINE

## 2017-04-27 PROCEDURE — 93306 TTE W/DOPPLER COMPLETE: CPT

## 2017-04-27 PROCEDURE — 77387 GUIDANCE FOR RADJ TX DLVR: CPT | Performed by: RADIOLOGY

## 2017-04-28 PROCEDURE — 77412 RADIATION TX DELIVERY LVL 3: CPT | Performed by: RADIOLOGY

## 2017-04-28 PROCEDURE — 77336 RADIATION PHYSICS CONSULT: CPT | Performed by: RADIOLOGY

## 2017-04-28 PROCEDURE — 77387 GUIDANCE FOR RADJ TX DLVR: CPT | Performed by: RADIOLOGY

## 2017-05-01 ENCOUNTER — APPOINTMENT (OUTPATIENT)
Dept: RADIATION ONCOLOGY | Facility: HOSPITAL | Age: 62
End: 2017-05-01
Attending: RADIOLOGY
Payer: MEDICAID

## 2017-05-01 VITALS
WEIGHT: 158 LBS | HEART RATE: 58 BPM | SYSTOLIC BLOOD PRESSURE: 163 MMHG | DIASTOLIC BLOOD PRESSURE: 86 MMHG | BODY MASS INDEX: 26 KG/M2 | RESPIRATION RATE: 16 BRPM

## 2017-05-01 DIAGNOSIS — C50.411 BREAST CANCER OF UPPER-OUTER QUADRANT OF RIGHT FEMALE BREAST (HCC): Primary | ICD-10-CM

## 2017-05-01 DIAGNOSIS — IMO0001 EFFECT OF CHEMOTHERAPY, INITIAL ENCOUNTER: ICD-10-CM

## 2017-05-01 PROCEDURE — 77412 RADIATION TX DELIVERY LVL 3: CPT | Performed by: RADIOLOGY

## 2017-05-01 PROCEDURE — 77387 GUIDANCE FOR RADJ TX DLVR: CPT | Performed by: RADIOLOGY

## 2017-05-01 RX ORDER — CEPHALEXIN 500 MG/1
500 CAPSULE ORAL 3 TIMES DAILY
Qty: 30 CAPSULE | Refills: 0 | Status: SHIPPED | OUTPATIENT
Start: 2017-05-01 | End: 2017-08-16 | Stop reason: ALTCHOICE

## 2017-05-01 RX ORDER — HYDROCODONE BITARTRATE AND ACETAMINOPHEN 5; 325 MG/1; MG/1
1-2 TABLET ORAL EVERY 6 HOURS PRN
Qty: 60 TABLET | Refills: 0 | OUTPATIENT
Start: 2017-05-01

## 2017-05-01 NOTE — PROGRESS NOTES
[facility] Radiation Treatment Management Note 6-10    Patient:  Jerrod Greenfield  Age:  63year old  Visit Diagnosis:    1.  Breast cancer of upper-outer quadrant of right female breast [facility])      Primary Rad/Onc:  Dr. Wheatley David

## 2017-05-02 PROCEDURE — 77387 GUIDANCE FOR RADJ TX DLVR: CPT | Performed by: RADIOLOGY

## 2017-05-02 PROCEDURE — 77412 RADIATION TX DELIVERY LVL 3: CPT | Performed by: RADIOLOGY

## 2017-05-03 ENCOUNTER — OFFICE VISIT (OUTPATIENT)
Dept: HEMATOLOGY/ONCOLOGY | Facility: HOSPITAL | Age: 62
End: 2017-05-03
Attending: INTERNAL MEDICINE
Payer: MEDICAID

## 2017-05-03 VITALS
HEART RATE: 59 BPM | TEMPERATURE: 98 F | SYSTOLIC BLOOD PRESSURE: 153 MMHG | RESPIRATION RATE: 16 BRPM | WEIGHT: 158 LBS | DIASTOLIC BLOOD PRESSURE: 77 MMHG | BODY MASS INDEX: 25.39 KG/M2 | HEIGHT: 66 IN

## 2017-05-03 VITALS
SYSTOLIC BLOOD PRESSURE: 153 MMHG | BODY MASS INDEX: 26 KG/M2 | HEART RATE: 59 BPM | WEIGHT: 158 LBS | DIASTOLIC BLOOD PRESSURE: 77 MMHG | RESPIRATION RATE: 16 BRPM | TEMPERATURE: 98 F

## 2017-05-03 DIAGNOSIS — C50.411 BREAST CANCER OF UPPER-OUTER QUADRANT OF RIGHT FEMALE BREAST (HCC): Primary | ICD-10-CM

## 2017-05-03 DIAGNOSIS — Z09 CHEMOTHERAPY FOLLOW-UP EXAMINATION: ICD-10-CM

## 2017-05-03 PROCEDURE — 77412 RADIATION TX DELIVERY LVL 3: CPT | Performed by: RADIOLOGY

## 2017-05-03 PROCEDURE — 96413 CHEMO IV INFUSION 1 HR: CPT

## 2017-05-03 PROCEDURE — 77387 GUIDANCE FOR RADJ TX DLVR: CPT | Performed by: RADIOLOGY

## 2017-05-03 PROCEDURE — 99215 OFFICE O/P EST HI 40 MIN: CPT | Performed by: INTERNAL MEDICINE

## 2017-05-03 RX ORDER — 0.9 % SODIUM CHLORIDE 0.9 %
VIAL (ML) INJECTION
Status: DISCONTINUED
Start: 2017-05-03 | End: 2017-05-03

## 2017-05-03 RX ORDER — HEPARIN SODIUM (PORCINE) LOCK FLUSH IV SOLN 100 UNIT/ML 100 UNIT/ML
SOLUTION INTRAVENOUS
Status: COMPLETED
Start: 2017-05-03 | End: 2017-05-03

## 2017-05-03 RX ORDER — HEPARIN SODIUM (PORCINE) LOCK FLUSH IV SOLN 100 UNIT/ML 100 UNIT/ML
5 SOLUTION INTRAVENOUS ONCE
Status: COMPLETED | OUTPATIENT
Start: 2017-05-03 | End: 2017-05-03

## 2017-05-03 RX ORDER — SODIUM CHLORIDE 9 MG/ML
INJECTION, SOLUTION INTRAVENOUS
Status: DISCONTINUED
Start: 2017-05-03 | End: 2017-05-03

## 2017-05-03 RX ADMIN — HEPARIN SODIUM (PORCINE) LOCK FLUSH IV SOLN 100 UNIT/ML 500 UNITS: 100 SOLUTION INTRAVENOUS at 13:20:00

## 2017-05-03 NOTE — PROGRESS NOTES
Pt here for C10 Herceptin. Complaints of fatigue, PN, stiffness in the am that is worked out by exercise/motion, et nail changes which she states are improving. Herceptin infusing. Passed off care of pt to Mc Kidd at 1312.

## 2017-05-03 NOTE — PROGRESS NOTES
DAR         Jarad Aguilera is a 63year old female who is here today for follow up of Breast cancer of upper-outer quadrant of right female breast (hcc)  (primary encounter diagnosis)  Chemotherapy follow-up examination       Pt here for follow up. Gastrointestinal: Positive for diarrhea (due to something she ate. ). Negative for nausea, vomiting, abdominal pain, constipation, blood in stool, abdominal distention and rectal pain. Endocrine: Negative for cold intolerance and heat intolerance.    Harriett Coenzyme Q10 (COQ10 OR) Take by mouth daily. Disp:  Rfl:    LUTEIN-ZEAXANTHIN OR Take by mouth daily.    Disp:  Rfl:    Levothyroxine Sodium 112 MCG Oral Tab Take 112 mcg by mouth before breakfast. Disp:  Rfl:      Allergies:     Penicillins             R Head: Normocephalic and atraumatic. Right Ear: External ear normal.   Left Ear: External ear normal.   Nose: Nose normal.   Oropharynx slightly red    Eyes: Conjunctivae and EOM are normal. Pupils are equal, round, and reactive to light.  No scleral icter Breast cancer of upper-outer quadrant of right female breast [facility])    Staging form: Breast, AJCC V7      Clinical stage from 10/5/2016: Stage IIA (T1c(3), N1, M0) - Signed by Obi Estrada MD on 10/5/2016      Pathologic stage from 3/22/2017: yT0, N0, cM -------------------------------------------------------------------------------  Study Conclusions    1. Left ventricle: The cavity size was normal. Wall thickness was increased in     a pattern of mild LVH.  Systolic function was normal. The estimated ejec

## 2017-05-03 NOTE — PROGRESS NOTES
Josemanuel completed for next 3 cycles, Brendan Garnica has md exam every 6 weeks. Herceptin infused with no s/s of adverse reaction noted. Port flushed with 10ml ns with positive blood return noted, 500 units heparin, and deaccessed.  Site covered with 2x2 bronwyn tristan

## 2017-05-04 PROCEDURE — 77412 RADIATION TX DELIVERY LVL 3: CPT | Performed by: RADIOLOGY

## 2017-05-04 PROCEDURE — 77387 GUIDANCE FOR RADJ TX DLVR: CPT | Performed by: RADIOLOGY

## 2017-05-05 ENCOUNTER — NURSE ONLY (OUTPATIENT)
Dept: INTERNAL MEDICINE CLINIC | Facility: CLINIC | Age: 62
End: 2017-05-05

## 2017-05-05 DIAGNOSIS — E06.3 HYPOTHYROIDISM DUE TO HASHIMOTO'S THYROIDITIS: ICD-10-CM

## 2017-05-05 DIAGNOSIS — E03.8 HYPOTHYROIDISM DUE TO HASHIMOTO'S THYROIDITIS: ICD-10-CM

## 2017-05-05 PROCEDURE — 84443 ASSAY THYROID STIM HORMONE: CPT | Performed by: INTERNAL MEDICINE

## 2017-05-05 PROCEDURE — 84480 ASSAY TRIIODOTHYRONINE (T3): CPT | Performed by: INTERNAL MEDICINE

## 2017-05-05 PROCEDURE — 36415 COLL VENOUS BLD VENIPUNCTURE: CPT | Performed by: INTERNAL MEDICINE

## 2017-05-05 PROCEDURE — 77412 RADIATION TX DELIVERY LVL 3: CPT | Performed by: RADIOLOGY

## 2017-05-05 PROCEDURE — 84439 ASSAY OF FREE THYROXINE: CPT | Performed by: INTERNAL MEDICINE

## 2017-05-05 PROCEDURE — 77387 GUIDANCE FOR RADJ TX DLVR: CPT | Performed by: RADIOLOGY

## 2017-05-05 PROCEDURE — 77336 RADIATION PHYSICS CONSULT: CPT | Performed by: RADIOLOGY

## 2017-05-08 ENCOUNTER — OFFICE VISIT (OUTPATIENT)
Dept: RADIATION ONCOLOGY | Facility: HOSPITAL | Age: 62
End: 2017-05-08
Attending: RADIOLOGY
Payer: MEDICAID

## 2017-05-08 VITALS — SYSTOLIC BLOOD PRESSURE: 152 MMHG | RESPIRATION RATE: 18 BRPM | DIASTOLIC BLOOD PRESSURE: 70 MMHG | HEART RATE: 61 BPM

## 2017-05-08 DIAGNOSIS — C50.411 BREAST CANCER OF UPPER-OUTER QUADRANT OF RIGHT FEMALE BREAST (HCC): Primary | ICD-10-CM

## 2017-05-08 PROCEDURE — 77387 GUIDANCE FOR RADJ TX DLVR: CPT | Performed by: RADIOLOGY

## 2017-05-08 PROCEDURE — 77412 RADIATION TX DELIVERY LVL 3: CPT | Performed by: RADIOLOGY

## 2017-05-08 NOTE — PROGRESS NOTES
[facility] Radiation Treatment Management Note 11-15    Patient:  Paresh Marquez  Age:  63year old  Visit Diagnosis:    1.  Breast cancer of upper-outer quadrant of right female breast [facility])      Primary Rad/Onc:  Dr. Dulce Mann

## 2017-05-09 PROCEDURE — 77387 GUIDANCE FOR RADJ TX DLVR: CPT | Performed by: RADIOLOGY

## 2017-05-09 PROCEDURE — 77412 RADIATION TX DELIVERY LVL 3: CPT | Performed by: RADIOLOGY

## 2017-05-10 PROCEDURE — 77412 RADIATION TX DELIVERY LVL 3: CPT | Performed by: RADIOLOGY

## 2017-05-10 PROCEDURE — 77387 GUIDANCE FOR RADJ TX DLVR: CPT | Performed by: RADIOLOGY

## 2017-05-11 PROCEDURE — 77387 GUIDANCE FOR RADJ TX DLVR: CPT | Performed by: RADIOLOGY

## 2017-05-11 PROCEDURE — 77412 RADIATION TX DELIVERY LVL 3: CPT | Performed by: RADIOLOGY

## 2017-05-12 PROCEDURE — 77336 RADIATION PHYSICS CONSULT: CPT | Performed by: RADIOLOGY

## 2017-05-12 PROCEDURE — 77387 GUIDANCE FOR RADJ TX DLVR: CPT | Performed by: RADIOLOGY

## 2017-05-12 PROCEDURE — 77412 RADIATION TX DELIVERY LVL 3: CPT | Performed by: RADIOLOGY

## 2017-05-15 ENCOUNTER — OFFICE VISIT (OUTPATIENT)
Dept: RADIATION ONCOLOGY | Facility: HOSPITAL | Age: 62
End: 2017-05-15
Attending: RADIOLOGY
Payer: MEDICAID

## 2017-05-15 VITALS
OXYGEN SATURATION: 99 % | RESPIRATION RATE: 16 BRPM | HEART RATE: 59 BPM | DIASTOLIC BLOOD PRESSURE: 77 MMHG | SYSTOLIC BLOOD PRESSURE: 135 MMHG | TEMPERATURE: 98 F

## 2017-05-15 PROCEDURE — 77387 GUIDANCE FOR RADJ TX DLVR: CPT | Performed by: RADIOLOGY

## 2017-05-15 PROCEDURE — 77412 RADIATION TX DELIVERY LVL 3: CPT | Performed by: RADIOLOGY

## 2017-05-15 RX ORDER — MOMETASONE FUROATE 1 MG/G
1 OINTMENT TOPICAL DAILY
Qty: 1 TUBE | Refills: 1 | Status: SHIPPED | OUTPATIENT
Start: 2017-05-15 | End: 2021-07-01

## 2017-05-15 NOTE — PROGRESS NOTES
[facility] Radiation Treatment Management Note 16-20    Patient:  Oral Bui  Age:  63year old  Visit Diagnosis:  No diagnosis found.   Primary Rad/Onc:  Dr. Kathleen Martinez Ricks      Site Delivered Dose (Gy) Prescribed Dose (Gy) Fraction #

## 2017-05-16 PROCEDURE — 77387 GUIDANCE FOR RADJ TX DLVR: CPT | Performed by: RADIOLOGY

## 2017-05-16 PROCEDURE — 77412 RADIATION TX DELIVERY LVL 3: CPT | Performed by: RADIOLOGY

## 2017-05-17 PROCEDURE — 77387 GUIDANCE FOR RADJ TX DLVR: CPT | Performed by: RADIOLOGY

## 2017-05-17 PROCEDURE — 77412 RADIATION TX DELIVERY LVL 3: CPT | Performed by: RADIOLOGY

## 2017-05-18 PROCEDURE — 77387 GUIDANCE FOR RADJ TX DLVR: CPT | Performed by: RADIOLOGY

## 2017-05-18 PROCEDURE — 77412 RADIATION TX DELIVERY LVL 3: CPT | Performed by: RADIOLOGY

## 2017-05-19 PROCEDURE — 77387 GUIDANCE FOR RADJ TX DLVR: CPT | Performed by: RADIOLOGY

## 2017-05-19 PROCEDURE — 77412 RADIATION TX DELIVERY LVL 3: CPT | Performed by: RADIOLOGY

## 2017-05-19 PROCEDURE — 77336 RADIATION PHYSICS CONSULT: CPT | Performed by: RADIOLOGY

## 2017-05-22 ENCOUNTER — OFFICE VISIT (OUTPATIENT)
Dept: RADIATION ONCOLOGY | Facility: HOSPITAL | Age: 62
End: 2017-05-22
Attending: RADIOLOGY
Payer: MEDICAID

## 2017-05-22 VITALS — HEART RATE: 63 BPM | DIASTOLIC BLOOD PRESSURE: 77 MMHG | SYSTOLIC BLOOD PRESSURE: 135 MMHG | RESPIRATION RATE: 16 BRPM

## 2017-05-22 DIAGNOSIS — C50.411 BREAST CANCER OF UPPER-OUTER QUADRANT OF RIGHT FEMALE BREAST (HCC): Primary | ICD-10-CM

## 2017-05-22 PROCEDURE — 77412 RADIATION TX DELIVERY LVL 3: CPT | Performed by: RADIOLOGY

## 2017-05-22 PROCEDURE — 77387 GUIDANCE FOR RADJ TX DLVR: CPT | Performed by: RADIOLOGY

## 2017-05-22 NOTE — PROGRESS NOTES
[facility] Radiation Treatment Management Note 21-25    Patient:  Flavio Castellon  Age:  63year old  Visit Diagnosis:    1.  Breast cancer of upper-outer quadrant of right female breast [facility])      Primary Rad/Onc:  Dr. Marley Necessary

## 2017-05-23 PROCEDURE — 77280 THER RAD SIMULAJ FIELD SMPL: CPT | Performed by: RADIOLOGY

## 2017-05-23 PROCEDURE — 77412 RADIATION TX DELIVERY LVL 3: CPT | Performed by: RADIOLOGY

## 2017-05-24 ENCOUNTER — OFFICE VISIT (OUTPATIENT)
Dept: HEMATOLOGY/ONCOLOGY | Facility: HOSPITAL | Age: 62
End: 2017-05-24
Attending: INTERNAL MEDICINE
Payer: MEDICAID

## 2017-05-24 VITALS
DIASTOLIC BLOOD PRESSURE: 75 MMHG | HEART RATE: 65 BPM | RESPIRATION RATE: 16 BRPM | SYSTOLIC BLOOD PRESSURE: 143 MMHG | TEMPERATURE: 98 F

## 2017-05-24 DIAGNOSIS — C50.411 BREAST CANCER OF UPPER-OUTER QUADRANT OF RIGHT FEMALE BREAST (HCC): Primary | ICD-10-CM

## 2017-05-24 PROCEDURE — 77412 RADIATION TX DELIVERY LVL 3: CPT | Performed by: RADIOLOGY

## 2017-05-24 PROCEDURE — 77387 GUIDANCE FOR RADJ TX DLVR: CPT | Performed by: RADIOLOGY

## 2017-05-24 PROCEDURE — 96413 CHEMO IV INFUSION 1 HR: CPT

## 2017-05-24 RX ORDER — HEPARIN SODIUM (PORCINE) LOCK FLUSH IV SOLN 100 UNIT/ML 100 UNIT/ML
5 SOLUTION INTRAVENOUS ONCE
Status: COMPLETED | OUTPATIENT
Start: 2017-05-24 | End: 2017-05-24

## 2017-05-24 RX ADMIN — HEPARIN SODIUM (PORCINE) LOCK FLUSH IV SOLN 100 UNIT/ML 500 UNITS: 100 SOLUTION INTRAVENOUS at 16:00:00

## 2017-05-24 NOTE — PROGRESS NOTES
Yomaira Kerns presents for C11D1 Herceptin; port accessed using sterile technique with brisk blood return noted. Cardiac testing in 04/7 appropriate for treatment. Herceptin given in 30 minutes, with no s/s of adverse reaction noted.     Yomaira Kerns has complaint

## 2017-05-25 PROCEDURE — 77412 RADIATION TX DELIVERY LVL 3: CPT | Performed by: RADIOLOGY

## 2017-05-25 PROCEDURE — 77387 GUIDANCE FOR RADJ TX DLVR: CPT | Performed by: RADIOLOGY

## 2017-05-26 PROCEDURE — 77336 RADIATION PHYSICS CONSULT: CPT | Performed by: RADIOLOGY

## 2017-05-30 ENCOUNTER — OFFICE VISIT (OUTPATIENT)
Dept: RADIATION ONCOLOGY | Facility: HOSPITAL | Age: 62
End: 2017-05-30
Attending: RADIOLOGY
Payer: MEDICAID

## 2017-05-30 DIAGNOSIS — C50.411 BREAST CANCER OF UPPER-OUTER QUADRANT OF RIGHT FEMALE BREAST (HCC): Primary | ICD-10-CM

## 2017-05-30 PROCEDURE — 77387 GUIDANCE FOR RADJ TX DLVR: CPT | Performed by: RADIOLOGY

## 2017-05-30 PROCEDURE — 77412 RADIATION TX DELIVERY LVL 3: CPT | Performed by: RADIOLOGY

## 2017-05-31 ENCOUNTER — OFFICE VISIT (OUTPATIENT)
Dept: RADIATION ONCOLOGY | Facility: HOSPITAL | Age: 62
End: 2017-05-31
Attending: RADIOLOGY
Payer: MEDICAID

## 2017-05-31 DIAGNOSIS — C50.411 BREAST CANCER OF UPPER-OUTER QUADRANT OF RIGHT FEMALE BREAST (HCC): Primary | ICD-10-CM

## 2017-05-31 PROCEDURE — 77387 GUIDANCE FOR RADJ TX DLVR: CPT | Performed by: RADIOLOGY

## 2017-05-31 PROCEDURE — 77412 RADIATION TX DELIVERY LVL 3: CPT | Performed by: RADIOLOGY

## 2017-05-31 NOTE — PROGRESS NOTES
[facility] Radiation Treatment Management Note 26-30    Patient:  Latonya Naval  Age:  [address] old  Visit Diagnosis:    1.  Breast cancer of upper-outer quadrant of right female breast [facility])      Primary Rad/Onc:  Dr. Jef Perez

## 2017-05-31 NOTE — PROGRESS NOTES
RADIATION ONCOLOGY COMPLETION SUMMARY NOTE    DIAGNOSIS :  Lymph node positive, Her2 positive Right breast cancer, Stage IIA (T1c(m), N1, M0), s/p neoadjuvant herceptin based chemotherapy, TCPH with treatment response, with pathologic CR, s/p lympectomy an Summary: Course: 1 R Breast    Treatment Site Energy Dose/Fx (Gy) #Fx Dose Correction (Gy) Total Dose (Gy) Start Date End Date Elapsed Days                         R Breast Bst 6X 2 5 / 5 0 10 5/23/2017 5/31/2017 8   R Breast:25Fx 6X 2 25 / 25 0 50 4/18/20

## 2017-06-01 ENCOUNTER — APPOINTMENT (OUTPATIENT)
Dept: RADIATION ONCOLOGY | Facility: HOSPITAL | Age: 62
End: 2017-06-01
Attending: RADIOLOGY
Payer: COMMERCIAL

## 2017-06-14 ENCOUNTER — OFFICE VISIT (OUTPATIENT)
Dept: HEMATOLOGY/ONCOLOGY | Facility: HOSPITAL | Age: 62
End: 2017-06-14
Attending: INTERNAL MEDICINE
Payer: COMMERCIAL

## 2017-06-14 ENCOUNTER — OFFICE VISIT (OUTPATIENT)
Dept: PHYSICAL THERAPY | Facility: HOSPITAL | Age: 62
End: 2017-06-14
Attending: SURGERY
Payer: COMMERCIAL

## 2017-06-14 VITALS
HEART RATE: 73 BPM | SYSTOLIC BLOOD PRESSURE: 139 MMHG | BODY MASS INDEX: 25 KG/M2 | DIASTOLIC BLOOD PRESSURE: 93 MMHG | RESPIRATION RATE: 16 BRPM | TEMPERATURE: 98 F | WEIGHT: 156 LBS

## 2017-06-14 VITALS
BODY MASS INDEX: 25.07 KG/M2 | TEMPERATURE: 98 F | HEART RATE: 73 BPM | RESPIRATION RATE: 16 BRPM | DIASTOLIC BLOOD PRESSURE: 93 MMHG | HEIGHT: 66 IN | WEIGHT: 156 LBS | SYSTOLIC BLOOD PRESSURE: 139 MMHG

## 2017-06-14 DIAGNOSIS — C50.411 MALIGNANT NEOPLASM OF UPPER-OUTER QUADRANT OF RIGHT BREAST IN FEMALE, ESTROGEN RECEPTOR NEGATIVE (HCC): Primary | ICD-10-CM

## 2017-06-14 DIAGNOSIS — Z09 CHEMOTHERAPY FOLLOW-UP EXAMINATION: ICD-10-CM

## 2017-06-14 DIAGNOSIS — Z17.1 MALIGNANT NEOPLASM OF UPPER-OUTER QUADRANT OF RIGHT BREAST IN FEMALE, ESTROGEN RECEPTOR NEGATIVE (HCC): Primary | ICD-10-CM

## 2017-06-14 DIAGNOSIS — C50.411 BREAST CANCER OF UPPER-OUTER QUADRANT OF RIGHT FEMALE BREAST (HCC): ICD-10-CM

## 2017-06-14 PROCEDURE — 99215 OFFICE O/P EST HI 40 MIN: CPT | Performed by: INTERNAL MEDICINE

## 2017-06-14 PROCEDURE — 96413 CHEMO IV INFUSION 1 HR: CPT

## 2017-06-14 RX ORDER — 0.9 % SODIUM CHLORIDE 0.9 %
VIAL (ML) INJECTION
Status: DISCONTINUED
Start: 2017-06-14 | End: 2017-06-14

## 2017-06-14 RX ORDER — HEPARIN SODIUM (PORCINE) LOCK FLUSH IV SOLN 100 UNIT/ML 100 UNIT/ML
5 SOLUTION INTRAVENOUS ONCE
Status: COMPLETED | OUTPATIENT
Start: 2017-06-14 | End: 2017-06-14

## 2017-06-14 RX ORDER — HEPARIN SODIUM (PORCINE) LOCK FLUSH IV SOLN 100 UNIT/ML 100 UNIT/ML
SOLUTION INTRAVENOUS
Status: COMPLETED
Start: 2017-06-14 | End: 2017-06-14

## 2017-06-14 RX ORDER — SODIUM CHLORIDE 9 MG/ML
INJECTION, SOLUTION INTRAVENOUS
Status: DISCONTINUED
Start: 2017-06-14 | End: 2017-06-14

## 2017-06-14 RX ADMIN — HEPARIN SODIUM (PORCINE) LOCK FLUSH IV SOLN 100 UNIT/ML 500 UNITS: 100 SOLUTION INTRAVENOUS at 12:35:00

## 2017-06-14 NOTE — PROGRESS NOTES
BREAST CANCER SURGICAL SCREENINGS  [facility]      PATIENT SUMMARY:     Involved Side:          RIGHT                                           Dominant Hand:        RIGHT                          Occupation:                     Inter ER     ER      IR nt    IR     IR     Sitting flex 150 150  Sitting flex 155 155  Sitting flex 160 160    abd 160 150   abd 160 155   abd 160 160    ext 60 60   ext 60 60   ext 60 60   Functional IR  T5 T5  Functional IR  T5 T5  Functional IR  T5 T5 redness, and/or heat in the at-risk arm, breast, chest, or truncal area? Any feelings of heaviness or tightness, swelling, redness, and/or heat in the at-risk arm, breast, chest, or truncal area?  NO                                     Arm Volume     Ar

## 2017-06-14 NOTE — PROGRESS NOTES
DAR Sheth is a 63year old female who is here today for follow up of Malignant neoplasm of upper-outer quadrant of right breast in female, estrogen receptor negative (hcc)  (primary encounter diagnosis)  Chemotherapy follow-up exam environmental allergies. Neurological: Positive for numbness (numbness and tingling to fingertips and toes. Better at toes. Still on gabapentin. ). Negative for dizziness, weakness, light-headedness and headaches.    Hematological: Negative for adenopath chemotherapy      TCHP 6 cycles (3 completed), Herceptin every 3 weeks (last 2/22/17)         Past Surgical History    DILATION/CURETTAGE,DIAGNOSTIC      OTHER      Comment excision of lipoma at L arm and lower back       Social History   Marital Status: D normal and breath sounds normal. No respiratory distress. She has no wheezes. Abdominal: Soft. Bowel sounds are normal. She exhibits no distension and no mass. There is no hepatosplenomegaly. There is no tenderness. There is no rebound and no guarding. Mammogram of the R breast 6 mo post RT. Will be due for B at that time, will be around November of 2017. PN due to chemo:  Continue neurontin. Hand exercises      No orders of the defined types were placed in this encounter.        Results From

## 2017-06-14 NOTE — PROGRESS NOTES
Patient arrives for C12 D1 of Herceptin. Patient arrives after MD apt. Patient reports she is feeling well, states she still has some issues with peripheral neuropathy but is able to complete her ADLs.  Patient states she has noticed she has become weaker,

## 2017-06-19 RX ORDER — LEVOTHYROXINE SODIUM 112 UG/1
TABLET ORAL
Qty: 90 TABLET | Refills: 0 | Status: SHIPPED | OUTPATIENT
Start: 2017-06-19 | End: 2017-09-10

## 2017-06-29 ENCOUNTER — TELEPHONE (OUTPATIENT)
Dept: INTERNAL MEDICINE CLINIC | Facility: CLINIC | Age: 62
End: 2017-06-29

## 2017-06-29 DIAGNOSIS — C50.411 MALIGNANT NEOPLASM OF UPPER-OUTER QUADRANT OF RIGHT BREAST IN FEMALE, ESTROGEN RECEPTOR NEGATIVE (HCC): Primary | ICD-10-CM

## 2017-06-29 DIAGNOSIS — Z09 CHEMOTHERAPY FOLLOW-UP EXAMINATION: ICD-10-CM

## 2017-06-29 DIAGNOSIS — Z17.1 MALIGNANT NEOPLASM OF UPPER-OUTER QUADRANT OF RIGHT BREAST IN FEMALE, ESTROGEN RECEPTOR NEGATIVE (HCC): Primary | ICD-10-CM

## 2017-06-29 NOTE — TELEPHONE ENCOUNTER
Test entered by Dr Ronnie Cardona not authorized yet, patient wants to schedule test between July 5 and before July 26 as she sees Dr Ronnie Cardona after the chemo July 26.

## 2017-07-05 ENCOUNTER — OFFICE VISIT (OUTPATIENT)
Dept: HEMATOLOGY/ONCOLOGY | Facility: HOSPITAL | Age: 62
End: 2017-07-05
Attending: INTERNAL MEDICINE
Payer: COMMERCIAL

## 2017-07-05 VITALS
RESPIRATION RATE: 16 BRPM | TEMPERATURE: 99 F | HEART RATE: 67 BPM | SYSTOLIC BLOOD PRESSURE: 155 MMHG | DIASTOLIC BLOOD PRESSURE: 81 MMHG

## 2017-07-05 DIAGNOSIS — C50.411 BREAST CANCER OF UPPER-OUTER QUADRANT OF RIGHT FEMALE BREAST (HCC): ICD-10-CM

## 2017-07-05 DIAGNOSIS — Z17.1 MALIGNANT NEOPLASM OF UPPER-OUTER QUADRANT OF RIGHT BREAST IN FEMALE, ESTROGEN RECEPTOR NEGATIVE (HCC): Primary | ICD-10-CM

## 2017-07-05 DIAGNOSIS — C50.411 MALIGNANT NEOPLASM OF UPPER-OUTER QUADRANT OF RIGHT BREAST IN FEMALE, ESTROGEN RECEPTOR NEGATIVE (HCC): Primary | ICD-10-CM

## 2017-07-05 PROCEDURE — 96413 CHEMO IV INFUSION 1 HR: CPT

## 2017-07-05 RX ORDER — 0.9 % SODIUM CHLORIDE 0.9 %
VIAL (ML) INJECTION
Status: DISCONTINUED
Start: 2017-07-05 | End: 2017-07-05

## 2017-07-05 RX ORDER — HEPARIN SODIUM (PORCINE) LOCK FLUSH IV SOLN 100 UNIT/ML 100 UNIT/ML
5 SOLUTION INTRAVENOUS ONCE
Status: CANCELLED | OUTPATIENT
Start: 2017-07-05

## 2017-07-05 RX ORDER — SODIUM CHLORIDE 9 MG/ML
INJECTION, SOLUTION INTRAVENOUS
Status: DISCONTINUED
Start: 2017-07-05 | End: 2017-07-05

## 2017-07-05 RX ORDER — 0.9 % SODIUM CHLORIDE 0.9 %
10 VIAL (ML) INJECTION ONCE
Status: CANCELLED | OUTPATIENT
Start: 2017-07-05

## 2017-07-05 RX ORDER — HEPARIN SODIUM (PORCINE) LOCK FLUSH IV SOLN 100 UNIT/ML 100 UNIT/ML
5 SOLUTION INTRAVENOUS ONCE
Status: COMPLETED | OUTPATIENT
Start: 2017-07-05 | End: 2017-07-05

## 2017-07-05 RX ORDER — HEPARIN SODIUM (PORCINE) LOCK FLUSH IV SOLN 100 UNIT/ML 100 UNIT/ML
SOLUTION INTRAVENOUS
Status: COMPLETED
Start: 2017-07-05 | End: 2017-07-05

## 2017-07-05 RX ADMIN — HEPARIN SODIUM (PORCINE) LOCK FLUSH IV SOLN 100 UNIT/ML 500 UNITS: 100 SOLUTION INTRAVENOUS at 12:02:00

## 2017-07-05 NOTE — PROGRESS NOTES
Patient arrives for C13 D1 of Herceptin. Patient reports she is feeling well, states she still has some issues with peripheral neuropathy but is able to complete her ADLs. Reports she was unable to sleep last night, reports some fatigue today.  Port [employer]

## 2017-07-18 ENCOUNTER — HOSPITAL ENCOUNTER (OUTPATIENT)
Dept: CV DIAGNOSTICS | Facility: HOSPITAL | Age: 62
Discharge: HOME | End: 2017-07-18
Attending: INTERNAL MEDICINE
Payer: COMMERCIAL

## 2017-07-18 DIAGNOSIS — Z09 CHEMOTHERAPY FOLLOW-UP EXAMINATION: ICD-10-CM

## 2017-07-18 DIAGNOSIS — C50.411 MALIGNANT NEOPLASM OF UPPER-OUTER QUADRANT OF RIGHT BREAST IN FEMALE, ESTROGEN RECEPTOR NEGATIVE (HCC): ICD-10-CM

## 2017-07-18 DIAGNOSIS — Z17.1 MALIGNANT NEOPLASM OF UPPER-OUTER QUADRANT OF RIGHT BREAST IN FEMALE, ESTROGEN RECEPTOR NEGATIVE (HCC): ICD-10-CM

## 2017-07-18 PROCEDURE — 93306 TTE W/DOPPLER COMPLETE: CPT | Performed by: INTERNAL MEDICINE

## 2017-07-26 ENCOUNTER — OFFICE VISIT (OUTPATIENT)
Dept: HEMATOLOGY/ONCOLOGY | Facility: HOSPITAL | Age: 62
End: 2017-07-26
Attending: INTERNAL MEDICINE
Payer: COMMERCIAL

## 2017-07-26 VITALS
SYSTOLIC BLOOD PRESSURE: 151 MMHG | DIASTOLIC BLOOD PRESSURE: 75 MMHG | BODY MASS INDEX: 25.23 KG/M2 | WEIGHT: 157 LBS | HEART RATE: 62 BPM | HEIGHT: 66 IN | RESPIRATION RATE: 18 BRPM | TEMPERATURE: 98 F

## 2017-07-26 DIAGNOSIS — Z17.1 MALIGNANT NEOPLASM OF UPPER-OUTER QUADRANT OF RIGHT BREAST IN FEMALE, ESTROGEN RECEPTOR NEGATIVE (HCC): Primary | ICD-10-CM

## 2017-07-26 DIAGNOSIS — C50.411 BREAST CANCER OF UPPER-OUTER QUADRANT OF RIGHT FEMALE BREAST (HCC): ICD-10-CM

## 2017-07-26 DIAGNOSIS — Z09 CHEMOTHERAPY FOLLOW-UP EXAMINATION: ICD-10-CM

## 2017-07-26 DIAGNOSIS — C50.411 MALIGNANT NEOPLASM OF UPPER-OUTER QUADRANT OF RIGHT BREAST IN FEMALE, ESTROGEN RECEPTOR NEGATIVE (HCC): Primary | ICD-10-CM

## 2017-07-26 PROCEDURE — 96413 CHEMO IV INFUSION 1 HR: CPT

## 2017-07-26 PROCEDURE — 99212 OFFICE O/P EST SF 10 MIN: CPT | Performed by: NURSE PRACTITIONER

## 2017-07-26 PROCEDURE — 99214 OFFICE O/P EST MOD 30 MIN: CPT | Performed by: NURSE PRACTITIONER

## 2017-07-26 RX ORDER — HEPARIN SODIUM (PORCINE) LOCK FLUSH IV SOLN 100 UNIT/ML 100 UNIT/ML
5 SOLUTION INTRAVENOUS ONCE
Status: CANCELLED | OUTPATIENT
Start: 2017-07-26

## 2017-07-26 RX ORDER — 0.9 % SODIUM CHLORIDE 0.9 %
10 VIAL (ML) INJECTION ONCE
Status: CANCELLED | OUTPATIENT
Start: 2017-07-26

## 2017-07-26 RX ORDER — HEPARIN SODIUM (PORCINE) LOCK FLUSH IV SOLN 100 UNIT/ML 100 UNIT/ML
5 SOLUTION INTRAVENOUS ONCE
Status: COMPLETED | OUTPATIENT
Start: 2017-07-26 | End: 2017-07-26

## 2017-07-26 RX ORDER — 0.9 % SODIUM CHLORIDE 0.9 %
VIAL (ML) INJECTION
Status: DISCONTINUED
Start: 2017-07-26 | End: 2017-07-26

## 2017-07-26 RX ORDER — SODIUM CHLORIDE 9 MG/ML
INJECTION, SOLUTION INTRAVENOUS
Status: DISCONTINUED
Start: 2017-07-26 | End: 2017-07-26

## 2017-07-26 RX ORDER — HEPARIN SODIUM (PORCINE) LOCK FLUSH IV SOLN 100 UNIT/ML 100 UNIT/ML
SOLUTION INTRAVENOUS
Status: COMPLETED
Start: 2017-07-26 | End: 2017-07-26

## 2017-07-26 RX ADMIN — HEPARIN SODIUM (PORCINE) LOCK FLUSH IV SOLN 100 UNIT/ML 500 UNITS: 100 SOLUTION INTRAVENOUS at 13:55:00

## 2017-07-26 NOTE — PROGRESS NOTES
DAR         Jacinto Singh is a 63year old female who is here today for follow up of Malignant neoplasm of upper-outer quadrant of right breast in female, estrogen receptor negative (hcc)  (primary encounter diagnosis)  Chemotherapy follow-up exam Better at toes. Still on gabapentin. ). Negative for dizziness, weakness, light-headedness and headaches. Hematological: Negative for adenopathy. Does not bruise/bleed easily. Psychiatric/Behavioral: Negative for sleep disturbance.  The patient is not n DILATION/CURETTAGE,DIAGNOSTIC  No date: OTHER      Comment: excision of lipoma at L arm and lower back    Social History  Social History   Marital status:   Spouse name: N/A    Years of education: N/A  Number of children: 0     Occupational History distress. She has no wheezes. Healing skin post RT  Left nipple dark spot on nipple- monitor   Abdominal: Soft. Bowel sounds are normal. She exhibits no distension and no mass. There is no hepatosplenomegaly. There is no tenderness.  There is no rebound a Mammogram of the R breast 6 mo post RT. Will be due for B at that time, will be around November of 2017. PN due to chemo:  Continue neurontin. Wellness house info given to pt along with [employer].        No orders of the defined types wer regurgitation. 4. Left atrium: The atrium was mildly dilated. 5. Pulmonary arteries: Systolic pressure was within the normal range. There has been no change when compared with prior report.  the previous strain  was _29% and now -24% ( although this is v valve appears to be grossly normal.    Doppler:   There  was no evidence for stenosis.    Mild regurgitation. Tricuspid valve:   Structurally normal valve.   Mobility was not restricted.   Doppler:   There was no evidence for stenosis.    Mild regurgitatio 10.01        -------  Mitral valve  Peak E-wave velocity                            72.2 cm/s   -------  Peak A-wave velocity                              37 cm/s   -------  Deceleration time                               *264 ms     150-230  Peak gradient

## 2017-07-26 NOTE — PROGRESS NOTES
Pt here for C14 Herceptin. Reports feeling well, slight fatigue et still some lingering PN that does not interfere w/ ADLs. Port accessed by [facility] using sterile technique, positive blood return noted. Most recent ejection fraction 75% on 7/18/17.  Appeare

## 2017-08-08 ENCOUNTER — TELEPHONE (OUTPATIENT)
Dept: HEMATOLOGY/ONCOLOGY | Facility: HOSPITAL | Age: 62
End: 2017-08-08

## 2017-08-08 NOTE — TELEPHONE ENCOUNTER
Received a call from patient sharing concerns after a recent appointment with Dr. Daylin Barr. Dr. Daylin Barr shared with patient that [address] [place] [address] will no longer accept [facility] after September 30th of 2017. Patient concerned as to next steps.   She is currently schedul

## 2017-08-09 ENCOUNTER — TELEPHONE (OUTPATIENT)
Dept: HEMATOLOGY/ONCOLOGY | Facility: HOSPITAL | Age: 62
End: 2017-08-09

## 2017-08-09 NOTE — TELEPHONE ENCOUNTER
Gregg Nieves called and she said she is having breast pain.  She said she really doesn't want to describe it as pain more like discomfort in the right breast. She said it has been persistent for a couple of days so she decided to call to see if she needs to be

## 2017-08-09 NOTE — TELEPHONE ENCOUNTER
Jose Thorpe reports that her right breast has been tender since doing [email or web address] yoga pose\" on 8/5/2017. She feels she may have exercised too much. She also reports accidentally hitting her right breast with her car door on 8/6/2017.   She said she has tenderne

## 2017-08-09 NOTE — TELEPHONE ENCOUNTER
Called pt back. States she may have over done her yoga this weekend. States she has been feeling well and over did it. States felt better after the ibuprofen and will wear bra for next few days. Sports bra is too tight, cuts too high in axilla.  She will ta

## 2017-08-16 ENCOUNTER — OFFICE VISIT (OUTPATIENT)
Dept: HEMATOLOGY/ONCOLOGY | Facility: HOSPITAL | Age: 62
End: 2017-08-16
Attending: NURSE PRACTITIONER
Payer: COMMERCIAL

## 2017-08-16 ENCOUNTER — OFFICE VISIT (OUTPATIENT)
Dept: HEMATOLOGY/ONCOLOGY | Facility: HOSPITAL | Age: 62
End: 2017-08-16
Attending: INTERNAL MEDICINE
Payer: COMMERCIAL

## 2017-08-16 VITALS
RESPIRATION RATE: 18 BRPM | TEMPERATURE: 98 F | HEIGHT: 66 IN | BODY MASS INDEX: 25.71 KG/M2 | SYSTOLIC BLOOD PRESSURE: 144 MMHG | WEIGHT: 160 LBS | DIASTOLIC BLOOD PRESSURE: 74 MMHG | HEART RATE: 63 BPM

## 2017-08-16 VITALS
SYSTOLIC BLOOD PRESSURE: 144 MMHG | DIASTOLIC BLOOD PRESSURE: 74 MMHG | WEIGHT: 160 LBS | TEMPERATURE: 98 F | BODY MASS INDEX: 26 KG/M2 | HEART RATE: 63 BPM | RESPIRATION RATE: 18 BRPM

## 2017-08-16 DIAGNOSIS — C50.411 BREAST CANCER OF UPPER-OUTER QUADRANT OF RIGHT FEMALE BREAST (HCC): ICD-10-CM

## 2017-08-16 DIAGNOSIS — Z17.1 MALIGNANT NEOPLASM OF UPPER-OUTER QUADRANT OF RIGHT BREAST IN FEMALE, ESTROGEN RECEPTOR NEGATIVE (HCC): Primary | ICD-10-CM

## 2017-08-16 DIAGNOSIS — C50.411 MALIGNANT NEOPLASM OF UPPER-OUTER QUADRANT OF RIGHT BREAST IN FEMALE, ESTROGEN RECEPTOR NEGATIVE (HCC): Primary | ICD-10-CM

## 2017-08-16 DIAGNOSIS — Z51.81 ENCOUNTER FOR MONITORING CARDIOTOXIC DRUG THERAPY: ICD-10-CM

## 2017-08-16 DIAGNOSIS — Z79.899 ENCOUNTER FOR MONITORING CARDIOTOXIC DRUG THERAPY: ICD-10-CM

## 2017-08-16 DIAGNOSIS — Z09 CHEMOTHERAPY FOLLOW-UP EXAMINATION: ICD-10-CM

## 2017-08-16 PROCEDURE — 99212 OFFICE O/P EST SF 10 MIN: CPT | Performed by: NURSE PRACTITIONER

## 2017-08-16 PROCEDURE — 96413 CHEMO IV INFUSION 1 HR: CPT

## 2017-08-16 PROCEDURE — 99214 OFFICE O/P EST MOD 30 MIN: CPT | Performed by: NURSE PRACTITIONER

## 2017-08-16 RX ORDER — 0.9 % SODIUM CHLORIDE 0.9 %
VIAL (ML) INJECTION
Status: DISCONTINUED
Start: 2017-08-16 | End: 2017-08-16

## 2017-08-16 RX ORDER — SODIUM CHLORIDE 9 MG/ML
INJECTION, SOLUTION INTRAVENOUS
Status: DISCONTINUED
Start: 2017-08-16 | End: 2017-08-16

## 2017-08-16 RX ORDER — HEPARIN SODIUM (PORCINE) LOCK FLUSH IV SOLN 100 UNIT/ML 100 UNIT/ML
5 SOLUTION INTRAVENOUS ONCE
Status: COMPLETED | OUTPATIENT
Start: 2017-08-16 | End: 2017-08-16

## 2017-08-16 RX ORDER — HEPARIN SODIUM (PORCINE) LOCK FLUSH IV SOLN 100 UNIT/ML 100 UNIT/ML
SOLUTION INTRAVENOUS
Status: COMPLETED
Start: 2017-08-16 | End: 2017-08-16

## 2017-08-16 RX ORDER — HEPARIN SODIUM (PORCINE) LOCK FLUSH IV SOLN 100 UNIT/ML 100 UNIT/ML
5 SOLUTION INTRAVENOUS ONCE
Status: CANCELLED | OUTPATIENT
Start: 2017-08-16

## 2017-08-16 RX ORDER — 0.9 % SODIUM CHLORIDE 0.9 %
10 VIAL (ML) INJECTION ONCE
Status: CANCELLED | OUTPATIENT
Start: 2017-08-16

## 2017-08-16 RX ADMIN — HEPARIN SODIUM (PORCINE) LOCK FLUSH IV SOLN 100 UNIT/ML 500 UNITS: 100 SOLUTION INTRAVENOUS at 14:06:00

## 2017-08-16 NOTE — PROGRESS NOTES
Patient arrives for C15 D1 of Herceptin. Patient reports she is feeling well, states she still has some issues with peripheral neuropathy but is able to complete her ADLs. Reports some fatigue today.  Port accessed using sterile technique, positive blood re

## 2017-08-16 NOTE — PROGRESS NOTES
DAR         Eva Stroud is a 63year old female who is here today for follow up of Malignant neoplasm of upper-outer quadrant of right breast in female, estrogen receptor negative (hcc)  (primary encounter diagnosis)       Pt here for follow up. back pain (in the mornings. ). Negative for gait problem and myalgias. Stiffness    Skin: Negative for pallor and rash. Allergic/Immunologic: Positive for environmental allergies.    Neurological: Positive for numbness (numbness and tingling to fing Cataract     Left eye   • Disorder of thyroid     hypothyroid   • High cholesterol    • Personal history of antineoplastic chemotherapy     TCHP 6 cycles (3 completed), Herceptin every 3 weeks (last 2/22/17)     Past Surgical History:  No date: DILATION/CU supple. No tracheal deviation present. No thyromegaly present. Cardiovascular: Normal rate, regular rhythm and normal heart sounds. No murmur heard. Pulmonary/Chest: Effort normal and breath sounds normal. No respiratory distress. She has no wheezes. that time. S/p cycle 14 of herceptin. Proceed with cycles 15. Monitoring cardiac toxicity:  Next echo due in July 2017 75%. Repeat in 2 Months. Will order for Sept       Mammogram of the R breast 6 mo post RT.    Will be due for B at that time, normal.     The estimated ejection fraction was 75%. Wall motion was normal; there were     no regional wall motion abnormalities. 2. Aortic valve: Sclerosis without stenosis. 3. Mitral valve: Mild regurgitation.   4. Left atrium: The atrium was mildly di  Mild regurgitation.    Peak  gradient: 2mm Hg (D). Left atrium:  The atrium was mildly dilated.   Right ventricle:  The cavity size was normal. Systolic function was normal.  Pulmonic valve:    The valve appears to be grossly normal.    Doppler:   There cm/s   -------  E/Ea, lateral annulus, tissue Doppler           6.33        -------  Ea, medial annulus, tissue Doppler              7.21 cm/s   -------  E/Ea, medial annulus, tissue Doppler           10.01        -------  Mitral valve  Peak E-wave velocit

## 2017-08-22 ENCOUNTER — TELEPHONE (OUTPATIENT)
Dept: HEMATOLOGY/ONCOLOGY | Facility: HOSPITAL | Age: 62
End: 2017-08-22

## 2017-08-22 NOTE — TELEPHONE ENCOUNTER
MILTON reached out to patient as a follow up to VM left for this writer. MILTON discussed with patient [facility] no longer accepting [facility] as of 9/30/17. Patient expresses her frustration related to this.      MILTON instruct patient to reach out to the [address] [place] [address]

## 2017-08-22 NOTE — TELEPHONE ENCOUNTER
SW reached out to patient, roommate Zohaib Elliott answered phone. Provided SW number to call back at patient's convenience. NAILA Beltre calling to discuss [employer] transition.

## 2017-09-06 ENCOUNTER — OFFICE VISIT (OUTPATIENT)
Dept: INTERNAL MEDICINE CLINIC | Facility: CLINIC | Age: 62
End: 2017-09-06

## 2017-09-06 ENCOUNTER — OFFICE VISIT (OUTPATIENT)
Dept: HEMATOLOGY/ONCOLOGY | Facility: HOSPITAL | Age: 62
End: 2017-09-06
Attending: INTERNAL MEDICINE
Payer: COMMERCIAL

## 2017-09-06 VITALS
WEIGHT: 160 LBS | OXYGEN SATURATION: 98 % | TEMPERATURE: 98 F | DIASTOLIC BLOOD PRESSURE: 72 MMHG | HEART RATE: 77 BPM | HEIGHT: 66 IN | SYSTOLIC BLOOD PRESSURE: 140 MMHG | RESPIRATION RATE: 17 BRPM | BODY MASS INDEX: 25.71 KG/M2

## 2017-09-06 VITALS
HEIGHT: 66 IN | BODY MASS INDEX: 25.39 KG/M2 | TEMPERATURE: 98 F | HEART RATE: 63 BPM | DIASTOLIC BLOOD PRESSURE: 72 MMHG | WEIGHT: 158 LBS | RESPIRATION RATE: 16 BRPM | SYSTOLIC BLOOD PRESSURE: 145 MMHG

## 2017-09-06 DIAGNOSIS — E06.3 HASHIMOTO'S THYROIDITIS: Primary | ICD-10-CM

## 2017-09-06 DIAGNOSIS — C50.411 BREAST CANCER OF UPPER-OUTER QUADRANT OF RIGHT FEMALE BREAST (HCC): ICD-10-CM

## 2017-09-06 DIAGNOSIS — Z17.1 MALIGNANT NEOPLASM OF UPPER-OUTER QUADRANT OF RIGHT BREAST IN FEMALE, ESTROGEN RECEPTOR NEGATIVE (HCC): Primary | ICD-10-CM

## 2017-09-06 DIAGNOSIS — R53.83 OTHER FATIGUE: ICD-10-CM

## 2017-09-06 DIAGNOSIS — C50.011 MALIGNANT NEOPLASM OF NIPPLE OF RIGHT BREAST IN FEMALE, UNSPECIFIED ESTROGEN RECEPTOR STATUS (HCC): ICD-10-CM

## 2017-09-06 DIAGNOSIS — Z09 CHEMOTHERAPY FOLLOW-UP EXAMINATION: ICD-10-CM

## 2017-09-06 DIAGNOSIS — C50.411 MALIGNANT NEOPLASM OF UPPER-OUTER QUADRANT OF RIGHT BREAST IN FEMALE, ESTROGEN RECEPTOR NEGATIVE (HCC): Primary | ICD-10-CM

## 2017-09-06 LAB
ALBUMIN SERPL BCP-MCNC: 4.2 G/DL (ref 3.5–4.8)
ALBUMIN/GLOB SERPL: 1.8 {RATIO} (ref 1–2)
ALP SERPL-CCNC: 60 U/L (ref 32–100)
ALT SERPL-CCNC: 18 U/L (ref 14–54)
ANION GAP SERPL CALC-SCNC: 8 MMOL/L (ref 0–18)
APPEARANCE: CLEAR
AST SERPL-CCNC: 26 U/L (ref 15–41)
BASOPHILS # BLD: 0 K/UL (ref 0–0.2)
BASOPHILS NFR BLD: 1 %
BILIRUB SERPL-MCNC: 0.3 MG/DL (ref 0.3–1.2)
BILIRUBIN: NEGATIVE
BUN SERPL-MCNC: 10 MG/DL (ref 8–20)
BUN/CREAT SERPL: 14.5 (ref 10–20)
CALCIUM SERPL-MCNC: 9 MG/DL (ref 8.5–10.5)
CHLORIDE SERPL-SCNC: 98 MMOL/L (ref 95–110)
CO2 SERPL-SCNC: 27 MMOL/L (ref 22–32)
CREAT SERPL-MCNC: 0.69 MG/DL (ref 0.5–1.5)
EOSINOPHIL # BLD: 0.1 K/UL (ref 0–0.7)
EOSINOPHIL NFR BLD: 3 %
ERYTHROCYTE [DISTWIDTH] IN BLOOD BY AUTOMATED COUNT: 15.2 % (ref 11–15)
GLOBULIN PLAS-MCNC: 2.4 G/DL (ref 2.5–3.7)
GLUCOSE (URINE DIPSTICK): NEGATIVE MG/DL
GLUCOSE SERPL-MCNC: 76 MG/DL (ref 70–99)
HCT VFR BLD AUTO: 39.9 % (ref 35–48)
HGB BLD-MCNC: 13.6 G/DL (ref 12–16)
KETONES (URINE DIPSTICK): NEGATIVE MG/DL
LEUKOCYTES: NEGATIVE
LYMPHOCYTES # BLD: 0.8 K/UL (ref 1–4)
LYMPHOCYTES NFR BLD: 19 %
MCH RBC QN AUTO: 32.3 PG (ref 27–32)
MCHC RBC AUTO-ENTMCNC: 34 G/DL (ref 32–37)
MCV RBC AUTO: 95.1 FL (ref 80–100)
MONOCYTES # BLD: 0.6 K/UL (ref 0–1)
MONOCYTES NFR BLD: 14 %
MULTISTIX LOT#: ABNORMAL NUMERIC
NEUTROPHILS # BLD AUTO: 2.7 K/UL (ref 1.8–7.7)
NEUTROPHILS NFR BLD: 64 %
NITRITE, URINE: NEGATIVE
OSMOLALITY UR CALC.SUM OF ELEC: 274 MOSM/KG (ref 275–295)
PH, URINE: 7 (ref 4.5–8)
PLATELET # BLD AUTO: 194 K/UL (ref 140–400)
PMV BLD AUTO: 8.1 FL (ref 7.4–10.3)
POTASSIUM SERPL-SCNC: 3.8 MMOL/L (ref 3.3–5.1)
PROT SERPL-MCNC: 6.6 G/DL (ref 5.9–8.4)
PROTEIN (URINE DIPSTICK): NEGATIVE MG/DL
RBC # BLD AUTO: 4.2 M/UL (ref 3.7–5.4)
SODIUM SERPL-SCNC: 133 MMOL/L (ref 136–144)
SPECIFIC GRAVITY: 1.01 (ref 1–1.03)
T3 SERPL-MCNC: 0.98 NG/ML (ref 0.87–1.78)
T4 FREE SERPL-MCNC: 1.19 NG/DL (ref 0.58–1.64)
THYROPEROXIDASE AB SERPL-ACNC: 41.7 IU/ML (ref 0–9)
TSH SERPL-ACNC: 0.42 UIU/ML (ref 0.45–5.33)
URINE-COLOR: YELLOW
UROBILINOGEN,SEMI-QN: 0.2 MG/DL (ref 0–1.9)
WBC # BLD AUTO: 4.2 K/UL (ref 4–11)

## 2017-09-06 PROCEDURE — 99214 OFFICE O/P EST MOD 30 MIN: CPT | Performed by: NURSE PRACTITIONER

## 2017-09-06 PROCEDURE — 99212 OFFICE O/P EST SF 10 MIN: CPT | Performed by: NURSE PRACTITIONER

## 2017-09-06 PROCEDURE — 96413 CHEMO IV INFUSION 1 HR: CPT

## 2017-09-06 PROCEDURE — 87086 URINE CULTURE/COLONY COUNT: CPT | Performed by: INTERNAL MEDICINE

## 2017-09-06 PROCEDURE — 84480 ASSAY TRIIODOTHYRONINE (T3): CPT | Performed by: INTERNAL MEDICINE

## 2017-09-06 PROCEDURE — 90471 IMMUNIZATION ADMIN: CPT | Performed by: INTERNAL MEDICINE

## 2017-09-06 PROCEDURE — 36415 COLL VENOUS BLD VENIPUNCTURE: CPT | Performed by: INTERNAL MEDICINE

## 2017-09-06 PROCEDURE — 84439 ASSAY OF FREE THYROXINE: CPT | Performed by: INTERNAL MEDICINE

## 2017-09-06 PROCEDURE — 99214 OFFICE O/P EST MOD 30 MIN: CPT | Performed by: INTERNAL MEDICINE

## 2017-09-06 PROCEDURE — 80050 GENERAL HEALTH PANEL: CPT | Performed by: INTERNAL MEDICINE

## 2017-09-06 PROCEDURE — 90686 IIV4 VACC NO PRSV 0.5 ML IM: CPT | Performed by: INTERNAL MEDICINE

## 2017-09-06 PROCEDURE — 86376 MICROSOMAL ANTIBODY EACH: CPT | Performed by: INTERNAL MEDICINE

## 2017-09-06 PROCEDURE — 81003 URINALYSIS AUTO W/O SCOPE: CPT | Performed by: INTERNAL MEDICINE

## 2017-09-06 RX ORDER — 0.9 % SODIUM CHLORIDE 0.9 %
10 VIAL (ML) INJECTION ONCE
Status: CANCELLED | OUTPATIENT
Start: 2017-09-06

## 2017-09-06 RX ORDER — HEPARIN SODIUM (PORCINE) LOCK FLUSH IV SOLN 100 UNIT/ML 100 UNIT/ML
5 SOLUTION INTRAVENOUS ONCE
Status: COMPLETED | OUTPATIENT
Start: 2017-09-06 | End: 2017-09-06

## 2017-09-06 RX ORDER — 0.9 % SODIUM CHLORIDE 0.9 %
VIAL (ML) INJECTION
Status: DISCONTINUED
Start: 2017-09-06 | End: 2017-09-06

## 2017-09-06 RX ORDER — HEPARIN SODIUM (PORCINE) LOCK FLUSH IV SOLN 100 UNIT/ML 100 UNIT/ML
5 SOLUTION INTRAVENOUS ONCE
Status: CANCELLED | OUTPATIENT
Start: 2017-09-06

## 2017-09-06 RX ORDER — SODIUM CHLORIDE 9 MG/ML
INJECTION, SOLUTION INTRAVENOUS
Status: DISCONTINUED
Start: 2017-09-06 | End: 2017-09-06

## 2017-09-06 RX ORDER — HEPARIN SODIUM (PORCINE) LOCK FLUSH IV SOLN 100 UNIT/ML 100 UNIT/ML
SOLUTION INTRAVENOUS
Status: DISCONTINUED
Start: 2017-09-06 | End: 2017-09-06

## 2017-09-06 RX ADMIN — HEPARIN SODIUM (PORCINE) LOCK FLUSH IV SOLN 100 UNIT/ML 500 UNITS: 100 SOLUTION INTRAVENOUS at 13:15:00

## 2017-09-06 NOTE — PROGRESS NOTES
HPI:    Patient ID: Jarad Aguilera is a 63year old female. Pt here for a yearly exam.  Is on the final phase of breast cancer treatment. Is overall ok but weak from chemotherapy. Has Hashimotos - energy level is ok.   Is taking statin for increase Physical Exam    Constitutional: She is oriented to person, place, and time. She appears well-developed and well-nourished. No distress. HENT:   Head: Normocephalic and atraumatic.    Right Ear: Tympanic membrane and ear canal normal. No cerumen present coordinating care.     Maddie Castillo MD  9/6/2017

## 2017-09-06 NOTE — PROGRESS NOTES
DAR Curiel is a 63year old female who is here today for follow up of Malignant neoplasm of upper-outer quadrant of right breast in female, estrogen receptor negative (hcc)  (primary encounter diagnosis)  Chemotherapy follow-up exam mornings. ). Negative for gait problem and myalgias. Stiffness    Skin: Negative for pallor and rash. Nail changes    Allergic/Immunologic: Positive for environmental allergies.    Neurological: Positive for numbness (numbness and tingling to f [facility])     right   • Cataract     Left eye   • Disorder of thyroid     hypothyroid   • High cholesterol    • Personal history of antineoplastic chemotherapy     TCHP 6 cycles (3 completed), Herceptin every 3 weeks (last 2/22/17)     Past Surgical History: Neck: Normal range of motion. Neck supple. No tracheal deviation present. No thyromegaly present. Cardiovascular: Normal rate, regular rhythm and normal heart sounds. No murmur heard.   Pulmonary/Chest: Effort normal and breath sounds normal. No resp is good. Complete 1 yr of herceptin total.  Start surveillance at that time. S/p cycle 15 of herceptin. Proceed with cycles 16. Monitoring cardiac toxicity:  Next echo scheduled for Sept 14. Mammogram of the R breast 6 mo post RT.    Anatoliy

## 2017-09-06 NOTE — PROGRESS NOTES
Pt's  verified  Per Dr. Leonie Astorga she administered flu vaccine in Pt'S Lt arm - authorization sent to scanning

## 2017-09-06 NOTE — PROGRESS NOTES
Pt to infusion for C16 D1 Herceptin. Arrived stable and ambulatory from clinic appointment. Pt reports feeling \"excellent. \" Denies any fevers or infections. Good energy level overall, although does notice fatigue with strenuous activity.  Good appetite, e

## 2017-09-07 ENCOUNTER — TELEPHONE (OUTPATIENT)
Dept: INTERNAL MEDICINE CLINIC | Facility: CLINIC | Age: 62
End: 2017-09-07

## 2017-09-14 ENCOUNTER — HOSPITAL ENCOUNTER (OUTPATIENT)
Dept: CV DIAGNOSTICS | Facility: HOSPITAL | Age: 62
Discharge: HOME | End: 2017-09-14
Attending: NURSE PRACTITIONER
Payer: COMMERCIAL

## 2017-09-14 DIAGNOSIS — Z17.1 MALIGNANT NEOPLASM OF UPPER-OUTER QUADRANT OF RIGHT BREAST IN FEMALE, ESTROGEN RECEPTOR NEGATIVE (HCC): ICD-10-CM

## 2017-09-14 DIAGNOSIS — Z51.81 ENCOUNTER FOR MONITORING CARDIOTOXIC DRUG THERAPY: ICD-10-CM

## 2017-09-14 DIAGNOSIS — C50.411 MALIGNANT NEOPLASM OF UPPER-OUTER QUADRANT OF RIGHT BREAST IN FEMALE, ESTROGEN RECEPTOR NEGATIVE (HCC): ICD-10-CM

## 2017-09-14 DIAGNOSIS — Z79.899 ENCOUNTER FOR MONITORING CARDIOTOXIC DRUG THERAPY: ICD-10-CM

## 2017-09-14 PROCEDURE — 93306 TTE W/DOPPLER COMPLETE: CPT | Performed by: NURSE PRACTITIONER

## 2017-09-18 ENCOUNTER — TELEPHONE (OUTPATIENT)
Dept: HEMATOLOGY/ONCOLOGY | Facility: HOSPITAL | Age: 62
End: 2017-09-18

## 2017-09-18 NOTE — TELEPHONE ENCOUNTER
SW provide supportive phone call to patient who has questions concerning [employer] coverage.  PAtient aware that [facility] no longer accepting [facility] as of Sept. 30th, 2017    Cary Redding LCSW

## 2017-09-19 NOTE — PROGRESS NOTES
Patient contacted Navigator regarding upcoming Radiation appointment tomorrow (4/18/17). Navigator verified patient's insurance authorization has been completed and that patient will be seeing Dr. Barbra Schofield and his RN, Lizandro Kelley, tomorrow, as well.   Patient acknowled Skin normal color for race, warm, dry and intact. No evidence of rash.

## 2017-09-20 ENCOUNTER — TELEPHONE (OUTPATIENT)
Dept: INTERNAL MEDICINE CLINIC | Facility: CLINIC | Age: 62
End: 2017-09-20

## 2017-09-26 NOTE — PROGRESS NOTES
Reviewed TTE with Dr Moises Roberts. Pt to proceed with last dose of Herceptin. Follow up with cardiology re change in cardiac strain.

## 2017-09-27 ENCOUNTER — TELEPHONE (OUTPATIENT)
Dept: HEMATOLOGY/ONCOLOGY | Facility: HOSPITAL | Age: 62
End: 2017-09-27

## 2017-09-27 ENCOUNTER — OFFICE VISIT (OUTPATIENT)
Dept: HEMATOLOGY/ONCOLOGY | Facility: HOSPITAL | Age: 62
End: 2017-09-27
Attending: INTERNAL MEDICINE
Payer: COMMERCIAL

## 2017-09-27 ENCOUNTER — APPOINTMENT (OUTPATIENT)
Dept: RADIATION ONCOLOGY | Facility: HOSPITAL | Age: 62
End: 2017-09-27
Attending: RADIOLOGY
Payer: COMMERCIAL

## 2017-09-27 ENCOUNTER — OFFICE VISIT (OUTPATIENT)
Dept: SURGERY | Facility: CLINIC | Age: 62
End: 2017-09-27

## 2017-09-27 VITALS
SYSTOLIC BLOOD PRESSURE: 164 MMHG | BODY MASS INDEX: 26 KG/M2 | HEART RATE: 78 BPM | DIASTOLIC BLOOD PRESSURE: 95 MMHG | RESPIRATION RATE: 20 BRPM | TEMPERATURE: 98 F | WEIGHT: 159.19 LBS

## 2017-09-27 VITALS
RESPIRATION RATE: 20 BRPM | SYSTOLIC BLOOD PRESSURE: 164 MMHG | BODY MASS INDEX: 26 KG/M2 | WEIGHT: 159.19 LBS | TEMPERATURE: 98 F | DIASTOLIC BLOOD PRESSURE: 95 MMHG | HEART RATE: 78 BPM

## 2017-09-27 DIAGNOSIS — C50.411 MALIGNANT NEOPLASM OF UPPER-OUTER QUADRANT OF RIGHT BREAST IN FEMALE, ESTROGEN RECEPTOR NEGATIVE (HCC): Primary | ICD-10-CM

## 2017-09-27 DIAGNOSIS — C50.411 MALIGNANT NEOPLASM OF UPPER-OUTER QUADRANT OF RIGHT FEMALE BREAST, UNSPECIFIED ESTROGEN RECEPTOR STATUS (HCC): Primary | ICD-10-CM

## 2017-09-27 DIAGNOSIS — Z51.81 ENCOUNTER FOR MONITORING CARDIOTOXIC DRUG THERAPY: ICD-10-CM

## 2017-09-27 DIAGNOSIS — Z17.1 MALIGNANT NEOPLASM OF UPPER-OUTER QUADRANT OF RIGHT BREAST IN FEMALE, ESTROGEN RECEPTOR NEGATIVE (HCC): Primary | ICD-10-CM

## 2017-09-27 DIAGNOSIS — E55.9 VITAMIN D DEFICIENCY: ICD-10-CM

## 2017-09-27 DIAGNOSIS — C50.411 BREAST CANCER OF UPPER-OUTER QUADRANT OF RIGHT FEMALE BREAST (HCC): ICD-10-CM

## 2017-09-27 DIAGNOSIS — Z79.899 ENCOUNTER FOR MONITORING CARDIOTOXIC DRUG THERAPY: ICD-10-CM

## 2017-09-27 DIAGNOSIS — Z09 CHEMOTHERAPY FOLLOW-UP EXAMINATION: ICD-10-CM

## 2017-09-27 DIAGNOSIS — M54.40 CHRONIC MIDLINE LOW BACK PAIN WITH SCIATICA, SCIATICA LATERALITY UNSPECIFIED: ICD-10-CM

## 2017-09-27 DIAGNOSIS — G89.29 CHRONIC MIDLINE LOW BACK PAIN WITH SCIATICA, SCIATICA LATERALITY UNSPECIFIED: ICD-10-CM

## 2017-09-27 LAB — MAGNESIUM SERPL-MCNC: 1.7 MG/DL (ref 1.8–2.5)

## 2017-09-27 PROCEDURE — 96413 CHEMO IV INFUSION 1 HR: CPT

## 2017-09-27 PROCEDURE — 82306 VITAMIN D 25 HYDROXY: CPT

## 2017-09-27 PROCEDURE — 99215 OFFICE O/P EST HI 40 MIN: CPT | Performed by: INTERNAL MEDICINE

## 2017-09-27 PROCEDURE — 99214 OFFICE O/P EST MOD 30 MIN: CPT | Performed by: SURGERY

## 2017-09-27 PROCEDURE — 83735 ASSAY OF MAGNESIUM: CPT

## 2017-09-27 PROCEDURE — 99212 OFFICE O/P EST SF 10 MIN: CPT | Performed by: INTERNAL MEDICINE

## 2017-09-27 RX ORDER — HEPARIN SODIUM (PORCINE) LOCK FLUSH IV SOLN 100 UNIT/ML 100 UNIT/ML
SOLUTION INTRAVENOUS
Status: COMPLETED
Start: 2017-09-27 | End: 2017-09-27

## 2017-09-27 RX ORDER — SODIUM CHLORIDE 9 MG/ML
INJECTION, SOLUTION INTRAVENOUS
Status: DISCONTINUED
Start: 2017-09-27 | End: 2017-09-27

## 2017-09-27 RX ORDER — HEPARIN SODIUM (PORCINE) LOCK FLUSH IV SOLN 100 UNIT/ML 100 UNIT/ML
5 SOLUTION INTRAVENOUS ONCE
Status: COMPLETED | OUTPATIENT
Start: 2017-09-27 | End: 2017-09-27

## 2017-09-27 RX ORDER — HYDROCODONE BITARTRATE AND ACETAMINOPHEN 5; 325 MG/1; MG/1
1-2 TABLET ORAL EVERY 6 HOURS PRN
Qty: 40 TABLET | Refills: 0 | Status: SHIPPED | OUTPATIENT
Start: 2017-09-27 | End: 2021-07-01

## 2017-09-27 RX ORDER — 0.9 % SODIUM CHLORIDE 0.9 %
10 VIAL (ML) INJECTION ONCE
Status: CANCELLED | OUTPATIENT
Start: 2017-09-27

## 2017-09-27 RX ORDER — HEPARIN SODIUM (PORCINE) LOCK FLUSH IV SOLN 100 UNIT/ML 100 UNIT/ML
5 SOLUTION INTRAVENOUS ONCE
Status: CANCELLED | OUTPATIENT
Start: 2017-09-27

## 2017-09-27 RX ORDER — 0.9 % SODIUM CHLORIDE 0.9 %
VIAL (ML) INJECTION
Status: DISCONTINUED
Start: 2017-09-27 | End: 2017-09-27

## 2017-09-27 RX ADMIN — HEPARIN SODIUM (PORCINE) LOCK FLUSH IV SOLN 100 UNIT/ML 500 UNITS: 100 SOLUTION INTRAVENOUS at 13:58:00

## 2017-09-27 NOTE — PROGRESS NOTES
Today is her last infusion of herceptin. Seeing Dr Reynolds Cea, and Dr Wanda Manuel as well. She is \"tired of feeling sick. \"   No issues with the breast. Breast sensitivity on affected side, she thinks from radiation.

## 2017-09-27 NOTE — PROGRESS NOTES
Pt arrived to infusion for last cycle of Herceptin. Pt states she is feeling well, offers no complaints. Is very excited that this is her last treatment. Pt had follow up with Dr. Amena Salazar, Dr. Nelson Zuniga, and Dr. Orlando Han today.  She is concerned about [employer] not bein

## 2017-09-27 NOTE — PROGRESS NOTES
DAR Sheth is a 63year old female who is here today for follow up of Malignant neoplasm of upper-outer quadrant of right breast in female, estrogen receptor negative (hcc)  (primary encounter diagnosis)  Chemotherapy follow-up examin Gastrointestinal: Negative for abdominal distention, abdominal pain, blood in stool, constipation, diarrhea, nausea, rectal pain and vomiting. Endocrine: Negative for cold intolerance and heat intolerance. Genitourinary: Positive for frequency.  Negativ • Personal history of antineoplastic chemotherapy     TCHP 6 cycles (3 completed), Herceptin every 3 weeks (last 2/22/17)     Past Surgical History:  No date: DILATION/CURETTAGE,DIAGNOSTIC  No date: OTHER      Comment: excision of lipoma at L arm and lower Neck: Normal range of motion. Neck supple. No tracheal deviation present. No thyromegaly present. Cardiovascular: Normal rate, regular rhythm and normal heart sounds. No murmur heard.   Pulmonary/Chest: Effort normal and breath sounds normal. No respir Staging form: Breast, AJCC V7      Clinical stage from 10/5/2016: Stage IIA (T1c(3), N1, M0) - Signed by Tevin Thomas MD on 10/5/2016      Pathologic stage from 3/22/2017: yT0, N0, cM0 - Signed by Tevin Thomas MD on 3/22/2017      Patient achieved HT:66in WT:159.7lb                                       BP: 151 / 90    -------------------------------------------------------------------------------  Indications:      Encounter for monitoring cardiotoxic drug therapy.    ------------------------------ status:  Outpatient.  Study date:  Study date: September 14, 2017.  Location:  [facility].     -------------------------------------------------------------------------------  Findings    Left ventricle:  The cavity size was normal. Wall thickness was LV internal dimension, ES, chordal level, PLAX 32.5 mm     23-38  Fractional shortening, chordal level, PLAX       33 %      >29  LV posterior wall thickness, ED                9.38 mm     -------  IVS/LVPW ratio, ED                             1.01       Electronically signed       09/14/2017 14:20  Yuliet Plasencia MD   Lab and Collection     CARD TTE STRAIN W DOPPLER ONCOLOGY (HKX=47460) on 9/14/2017   Result History     CARD TTE STRAIN W DOPPLER ONCOLOGY (CPT=93306) on 9/14/2017

## 2017-09-27 NOTE — TELEPHONE ENCOUNTER
Pt called to verify appt times for today. She is scheduled to see Dr. Keke Case at 11 am, Dr. Tree Murphy and Dr. Amber Ashley at 11:30 am, somehow double booked, and infusion at 1 pm.  Patient stated someone called her to reschedule her appt with Dr. Tree Murphy to 12 noon.   I could

## 2017-09-29 LAB — 25(OH)D3 SERPL-MCNC: 37.3 NG/ML

## 2017-10-04 ENCOUNTER — APPOINTMENT (OUTPATIENT)
Dept: RADIATION ONCOLOGY | Facility: HOSPITAL | Age: 62
End: 2017-10-04
Attending: RADIOLOGY
Payer: COMMERCIAL

## 2017-11-15 ENCOUNTER — APPOINTMENT (OUTPATIENT)
Dept: HEMATOLOGY/ONCOLOGY | Facility: HOSPITAL | Age: 62
End: 2017-11-15
Attending: INTERNAL MEDICINE
Payer: COMMERCIAL

## 2017-11-20 ENCOUNTER — TELEPHONE (OUTPATIENT)
Dept: HEMATOLOGY/ONCOLOGY | Facility: HOSPITAL | Age: 62
End: 2017-11-20

## 2017-11-20 NOTE — TELEPHONE ENCOUNTER
Patient called expressing concern regarding upcoming port flush. SW will speak with Yamilex Colin regarding these concerns as they relate to insurance. Patient expresses appreciation for support/assistance.     Harriett Powell LCSW

## 2017-11-29 ENCOUNTER — NURSE ONLY (OUTPATIENT)
Dept: HEMATOLOGY/ONCOLOGY | Facility: HOSPITAL | Age: 62
End: 2017-11-29
Attending: INTERNAL MEDICINE
Payer: COMMERCIAL

## 2017-11-29 VITALS
TEMPERATURE: 98 F | RESPIRATION RATE: 18 BRPM | DIASTOLIC BLOOD PRESSURE: 81 MMHG | HEART RATE: 62 BPM | SYSTOLIC BLOOD PRESSURE: 151 MMHG

## 2017-11-29 DIAGNOSIS — Z17.1 MALIGNANT NEOPLASM OF UPPER-OUTER QUADRANT OF RIGHT BREAST IN FEMALE, ESTROGEN RECEPTOR NEGATIVE (HCC): Primary | ICD-10-CM

## 2017-11-29 DIAGNOSIS — C50.411 MALIGNANT NEOPLASM OF UPPER-OUTER QUADRANT OF RIGHT BREAST IN FEMALE, ESTROGEN RECEPTOR NEGATIVE (HCC): Primary | ICD-10-CM

## 2017-11-29 PROCEDURE — 96523 IRRIG DRUG DELIVERY DEVICE: CPT

## 2017-11-29 RX ORDER — HEPARIN SODIUM (PORCINE) LOCK FLUSH IV SOLN 100 UNIT/ML 100 UNIT/ML
5 SOLUTION INTRAVENOUS ONCE
Status: COMPLETED | OUTPATIENT
Start: 2017-11-29 | End: 2017-11-29

## 2017-11-29 RX ORDER — HEPARIN SODIUM (PORCINE) LOCK FLUSH IV SOLN 100 UNIT/ML 100 UNIT/ML
5 SOLUTION INTRAVENOUS ONCE
Status: CANCELLED | OUTPATIENT
Start: 2017-11-29

## 2017-11-29 RX ORDER — HEPARIN SODIUM (PORCINE) LOCK FLUSH IV SOLN 100 UNIT/ML 100 UNIT/ML
SOLUTION INTRAVENOUS
Status: DISCONTINUED
Start: 2017-11-29 | End: 2017-11-29

## 2017-11-29 RX ORDER — 0.9 % SODIUM CHLORIDE 0.9 %
10 VIAL (ML) INJECTION ONCE
Status: CANCELLED | OUTPATIENT
Start: 2017-11-29

## 2017-11-29 RX ORDER — 0.9 % SODIUM CHLORIDE 0.9 %
VIAL (ML) INJECTION
Status: DISCONTINUED
Start: 2017-11-29 | End: 2017-11-29

## 2017-11-29 RX ORDER — 0.9 % SODIUM CHLORIDE 0.9 %
10 VIAL (ML) INJECTION ONCE
Status: COMPLETED | OUTPATIENT
Start: 2017-11-29 | End: 2017-11-29

## 2017-11-29 RX ADMIN — 0.9 % SODIUM CHLORIDE 10 ML: 0.9 % VIAL (ML) INJECTION at 11:30:00

## 2017-11-29 RX ADMIN — HEPARIN SODIUM (PORCINE) LOCK FLUSH IV SOLN 100 UNIT/ML 500 UNITS: 100 SOLUTION INTRAVENOUS at 11:30:00

## 2017-11-29 NOTE — PROGRESS NOTES
To dept for port flush. Port flushed per protocol, excellent blood return noted, flushed freely with saline and heparin, decannulated, dsg to site, tolerated well. Pt discharged ambulatory and stable.

## 2017-12-08 ENCOUNTER — TELEPHONE (OUTPATIENT)
Dept: HEMATOLOGY/ONCOLOGY | Facility: HOSPITAL | Age: 62
End: 2017-12-08

## 2017-12-08 NOTE — TELEPHONE ENCOUNTER
Called patient and reviewed survivorship care plan with patient. States she is feeling well, her neuropathy has improved. Cream and lotion to chest wall, still sensitive post RT.  Will follow up in January with Dr Kieran Christina awaiting insurance change to be effecti

## 2018-01-03 NOTE — PROGRESS NOTES
Breast Surgery Surveillance Visit    Diagnosis: Infiltrating Ductal Carcinoma, right breast; ER negative, W[place] negative, Her-2/clif positive status post lumpectomy with SLNB on March 13, 2017    Stage: Breast cancer of upper-outer quadrant of right female rusty performed without complication. She has completed radiation since her last visit without complication. She denies any new concerns to her breasts bilaterally. She has had no new imaging postoperatively.   She is here today for evaluation and recommendatio Disorder Mother      pacmaker   • ALS[other] [OTHER] Father    She is not of Ashkenazi Spiritism ancestry.     Social History:    Alcohol use No       Smoking status: Current Some Day Smoker 0.50 Packs/day For 30.00 Years   Types: Cigarettes   Smokeless tobacc itching, skin lesions, dry skin, change in skin color or change in moles. Hematologic/Lymphatic:  The patient denies easily bruising or bleeding or persistent swollen glands or lymph nodes.      Musculoskeletal:  The patient denies muscle aches/pain, shelley quadrant of right female breast [facility])    Staging form: Breast, AJCC V7      Clinical stage from 10/5/2016: Stage IIA (T1c(3), N1, M0) - Signed by Oneal Stanley MD on 10/5/2016    Discussion and Plan:  I had a discussion with the Patient regarding her rusty

## 2018-01-09 ENCOUNTER — TELEPHONE (OUTPATIENT)
Dept: HEMATOLOGY/ONCOLOGY | Facility: HOSPITAL | Age: 63
End: 2018-01-09

## 2018-01-09 NOTE — TELEPHONE ENCOUNTER
MILTON reached out to patient as a follow up to  left. Patient states that she is officially switched to [facility], but has not yet received her card in the mail.  MILTON suggest patient reach out to [place] to obtain Medicaid ID number in the event card is not

## 2018-01-16 ENCOUNTER — TELEPHONE (OUTPATIENT)
Dept: HEMATOLOGY/ONCOLOGY | Facility: HOSPITAL | Age: 63
End: 2018-01-16

## 2018-01-16 NOTE — TELEPHONE ENCOUNTER
Returned phone call left message with room mate that I spoke with authorization and port flush appointment ok. Pt to have TTE test due in March and 6 mos post RT Mammogram. Pt should schedule a f/u appointment with Dr. Cyril Lewis and Taryn Sánchez.  Informed will forward

## 2018-01-24 RX ORDER — CIPROFLOXACIN 500 MG/1
500 TABLET, FILM COATED ORAL 2 TIMES DAILY
Qty: 20 TABLET | Refills: 1 | Status: SHIPPED | OUTPATIENT
Start: 2018-01-24 | End: 2018-02-21 | Stop reason: ALTCHOICE

## 2018-01-31 ENCOUNTER — NURSE ONLY (OUTPATIENT)
Dept: HEMATOLOGY/ONCOLOGY | Facility: HOSPITAL | Age: 63
End: 2018-01-31
Attending: INTERNAL MEDICINE
Payer: MEDICAID

## 2018-01-31 DIAGNOSIS — Z17.1 MALIGNANT NEOPLASM OF UPPER-OUTER QUADRANT OF RIGHT BREAST IN FEMALE, ESTROGEN RECEPTOR NEGATIVE (HCC): Primary | ICD-10-CM

## 2018-01-31 DIAGNOSIS — C50.411 MALIGNANT NEOPLASM OF UPPER-OUTER QUADRANT OF RIGHT BREAST IN FEMALE, ESTROGEN RECEPTOR NEGATIVE (HCC): Primary | ICD-10-CM

## 2018-01-31 PROCEDURE — 96523 IRRIG DRUG DELIVERY DEVICE: CPT

## 2018-01-31 RX ORDER — 0.9 % SODIUM CHLORIDE 0.9 %
10 VIAL (ML) INJECTION ONCE
Status: CANCELLED | OUTPATIENT
Start: 2018-01-31

## 2018-01-31 RX ORDER — 0.9 % SODIUM CHLORIDE 0.9 %
VIAL (ML) INJECTION
Status: DISCONTINUED
Start: 2018-01-31 | End: 2018-01-31

## 2018-01-31 RX ORDER — HEPARIN SODIUM (PORCINE) LOCK FLUSH IV SOLN 100 UNIT/ML 100 UNIT/ML
5 SOLUTION INTRAVENOUS ONCE
Status: COMPLETED | OUTPATIENT
Start: 2018-01-31 | End: 2018-01-31

## 2018-01-31 RX ORDER — HEPARIN SODIUM (PORCINE) LOCK FLUSH IV SOLN 100 UNIT/ML 100 UNIT/ML
5 SOLUTION INTRAVENOUS ONCE
Status: CANCELLED | OUTPATIENT
Start: 2018-01-31

## 2018-01-31 RX ORDER — 0.9 % SODIUM CHLORIDE 0.9 %
10 VIAL (ML) INJECTION ONCE
Status: DISCONTINUED | OUTPATIENT
Start: 2018-01-31 | End: 2018-01-31

## 2018-01-31 RX ORDER — HEPARIN SODIUM (PORCINE) LOCK FLUSH IV SOLN 100 UNIT/ML 100 UNIT/ML
SOLUTION INTRAVENOUS
Status: COMPLETED
Start: 2018-01-31 | End: 2018-01-31

## 2018-01-31 RX ADMIN — HEPARIN SODIUM (PORCINE) LOCK FLUSH IV SOLN 100 UNIT/ML 500 UNITS: 100 SOLUTION INTRAVENOUS at 13:05:00

## 2018-01-31 NOTE — PROGRESS NOTES
Patient arrived for port flush. Ambulated from waiting room with steady gait,  Patient states she has been fighting a cold for a few weeks, but it is getting better.   Port accessed using sterile technique- good blood return noted and flushed without diffi

## 2018-02-13 ENCOUNTER — TELEPHONE (OUTPATIENT)
Dept: HEMATOLOGY/ONCOLOGY | Facility: HOSPITAL | Age: 63
End: 2018-02-13

## 2018-02-13 NOTE — TELEPHONE ENCOUNTER
Pt called and stated that due to have a mammogram in March and wondering if can have done earlier. Informed that has to wait for insurance authorization. Pt verbalizes understanding.

## 2018-02-20 ENCOUNTER — TELEPHONE (OUTPATIENT)
Dept: SURGERY | Facility: CLINIC | Age: 63
End: 2018-02-20

## 2018-02-20 NOTE — TELEPHONE ENCOUNTER
Pt notified that her mammogram is authorized and she can go ahead to schedule. Reviewed scheduling phone number. Pt verbalized understanding.

## 2018-02-21 ENCOUNTER — OFFICE VISIT (OUTPATIENT)
Dept: INTERNAL MEDICINE CLINIC | Facility: CLINIC | Age: 63
End: 2018-02-21

## 2018-02-21 VITALS
WEIGHT: 164.5 LBS | DIASTOLIC BLOOD PRESSURE: 68 MMHG | HEIGHT: 66 IN | HEART RATE: 73 BPM | SYSTOLIC BLOOD PRESSURE: 122 MMHG | RESPIRATION RATE: 18 BRPM | TEMPERATURE: 99 F | OXYGEN SATURATION: 97 % | BODY MASS INDEX: 26.44 KG/M2

## 2018-02-21 DIAGNOSIS — R00.2 PALPITATIONS: ICD-10-CM

## 2018-02-21 DIAGNOSIS — C50.011 MALIGNANT NEOPLASM OF NIPPLE OF RIGHT BREAST IN FEMALE, UNSPECIFIED ESTROGEN RECEPTOR STATUS (HCC): ICD-10-CM

## 2018-02-21 DIAGNOSIS — Z51.81 ENCOUNTER FOR THERAPEUTIC DRUG MONITORING: ICD-10-CM

## 2018-02-21 DIAGNOSIS — E03.8 OTHER SPECIFIED HYPOTHYROIDISM: Primary | ICD-10-CM

## 2018-02-21 LAB
T3 SERPL-MCNC: 0.93 NG/ML (ref 0.87–1.78)
T4 FREE SERPL-MCNC: 1.03 NG/DL (ref 0.58–1.64)
TSH SERPL-ACNC: 0.18 UIU/ML (ref 0.45–5.33)

## 2018-02-21 PROCEDURE — 84439 ASSAY OF FREE THYROXINE: CPT | Performed by: INTERNAL MEDICINE

## 2018-02-21 PROCEDURE — 99214 OFFICE O/P EST MOD 30 MIN: CPT | Performed by: INTERNAL MEDICINE

## 2018-02-21 PROCEDURE — 84480 ASSAY TRIIODOTHYRONINE (T3): CPT | Performed by: INTERNAL MEDICINE

## 2018-02-21 PROCEDURE — 84443 ASSAY THYROID STIM HORMONE: CPT | Performed by: INTERNAL MEDICINE

## 2018-02-21 RX ORDER — ROSUVASTATIN CALCIUM 10 MG/1
10 TABLET, COATED ORAL NIGHTLY
Qty: 30 TABLET | Refills: 6 | Status: SHIPPED | OUTPATIENT
Start: 2018-02-21 | End: 2020-02-14

## 2018-02-21 RX ORDER — GABAPENTIN 300 MG/1
300 CAPSULE ORAL 3 TIMES DAILY
Qty: 270 CAPSULE | Refills: 3 | Status: SHIPPED | OUTPATIENT
Start: 2018-02-21 | End: 2019-02-25

## 2018-02-21 NOTE — PROGRESS NOTES
HPI:    Patient ID: Fernando Garcia is a 63year old female. Patient here for evaluation of some fluttering feeling in the chest lasting several seconds and an unusual feeling in the neck.   Is done with Herceptin rx and is due to have a diagnostic ec daily.   Disp:  Rfl:      Allergies:  Penicillins             Rash   PHYSICAL EXAM:   Physical Exam    Constitutional: She is oriented to person, place, and time. She appears well-developed and well-nourished. HENT:   Head: Normocephalic and atraumatic.

## 2018-02-22 ENCOUNTER — TELEPHONE (OUTPATIENT)
Dept: INTERNAL MEDICINE CLINIC | Facility: CLINIC | Age: 63
End: 2018-02-22

## 2018-02-22 NOTE — TELEPHONE ENCOUNTER
----- Message from Cordell Mujica sent at 2/22/2018  8:39 AM CST -----  Yes approved   ----- Message -----  From: ROXANA Mascorro  Sent: 2/21/2018   1:48 PM  To: Cordell Mujica    Can you please check to see if ECHO IS APPROVED?  -Tong Lindsey

## 2018-02-22 NOTE — TELEPHONE ENCOUNTER
Pt's  verified  Called Pt and informed that ECHO has been approved per Ofelia Redding in referral dept- she will contact scheduling to have procedure done.

## 2018-02-23 RX ORDER — LEVOTHYROXINE SODIUM 0.1 MG/1
100 TABLET ORAL DAILY
Qty: 90 TABLET | Refills: 3 | Status: SHIPPED | OUTPATIENT
Start: 2018-02-23 | End: 2018-10-19

## 2018-02-27 ENCOUNTER — HOSPITAL ENCOUNTER (OUTPATIENT)
Dept: MAMMOGRAPHY | Facility: HOSPITAL | Age: 63
Discharge: HOME | End: 2018-02-27
Attending: SURGERY
Payer: MEDICAID

## 2018-02-27 DIAGNOSIS — C50.411 BREAST CANCER OF UPPER-OUTER QUADRANT OF RIGHT FEMALE BREAST (HCC): ICD-10-CM

## 2018-02-27 PROCEDURE — 77062 BREAST TOMOSYNTHESIS BI: CPT | Performed by: SURGERY

## 2018-02-27 PROCEDURE — 77066 DX MAMMO INCL CAD BI: CPT | Performed by: SURGERY

## 2018-03-06 ENCOUNTER — HOSPITAL ENCOUNTER (OUTPATIENT)
Dept: CV DIAGNOSTICS | Facility: HOSPITAL | Age: 63
Discharge: HOME | End: 2018-03-06
Attending: INTERNAL MEDICINE
Payer: MEDICAID

## 2018-03-06 DIAGNOSIS — Z51.81 ENCOUNTER FOR THERAPEUTIC DRUG MONITORING: ICD-10-CM

## 2018-03-06 PROCEDURE — 93307 TTE W/O DOPPLER COMPLETE: CPT | Performed by: INTERNAL MEDICINE

## 2018-03-07 ENCOUNTER — OFFICE VISIT (OUTPATIENT)
Dept: SURGERY | Facility: CLINIC | Age: 63
End: 2018-03-07

## 2018-03-07 VITALS
SYSTOLIC BLOOD PRESSURE: 150 MMHG | HEART RATE: 68 BPM | HEIGHT: 66 IN | RESPIRATION RATE: 20 BRPM | BODY MASS INDEX: 26.36 KG/M2 | WEIGHT: 164 LBS | DIASTOLIC BLOOD PRESSURE: 81 MMHG | OXYGEN SATURATION: 98 %

## 2018-03-07 DIAGNOSIS — C50.411 MALIGNANT NEOPLASM OF UPPER-OUTER QUADRANT OF RIGHT FEMALE BREAST, UNSPECIFIED ESTROGEN RECEPTOR STATUS (HCC): Primary | ICD-10-CM

## 2018-03-07 PROCEDURE — 99214 OFFICE O/P EST MOD 30 MIN: CPT | Performed by: SURGERY

## 2018-03-28 ENCOUNTER — OFFICE VISIT (OUTPATIENT)
Dept: HEMATOLOGY/ONCOLOGY | Facility: HOSPITAL | Age: 63
End: 2018-03-28
Attending: INTERNAL MEDICINE
Payer: MEDICAID

## 2018-03-28 VITALS
SYSTOLIC BLOOD PRESSURE: 121 MMHG | RESPIRATION RATE: 16 BRPM | HEIGHT: 66 IN | BODY MASS INDEX: 27.16 KG/M2 | TEMPERATURE: 98 F | HEART RATE: 62 BPM | DIASTOLIC BLOOD PRESSURE: 63 MMHG | WEIGHT: 169 LBS

## 2018-03-28 DIAGNOSIS — C50.411 MALIGNANT NEOPLASM OF UPPER-OUTER QUADRANT OF RIGHT BREAST IN FEMALE, ESTROGEN RECEPTOR NEGATIVE (HCC): Primary | ICD-10-CM

## 2018-03-28 DIAGNOSIS — Z17.1 MALIGNANT NEOPLASM OF UPPER-OUTER QUADRANT OF RIGHT BREAST IN FEMALE, ESTROGEN RECEPTOR NEGATIVE (HCC): Primary | ICD-10-CM

## 2018-03-28 DIAGNOSIS — R92.2 DENSE BREASTS: ICD-10-CM

## 2018-03-28 DIAGNOSIS — Z79.899 ENCOUNTER FOR MONITORING CARDIOTOXIC DRUG THERAPY: ICD-10-CM

## 2018-03-28 DIAGNOSIS — Z51.81 ENCOUNTER FOR MONITORING CARDIOTOXIC DRUG THERAPY: ICD-10-CM

## 2018-03-28 PROCEDURE — 99214 OFFICE O/P EST MOD 30 MIN: CPT | Performed by: INTERNAL MEDICINE

## 2018-03-28 PROCEDURE — 96523 IRRIG DRUG DELIVERY DEVICE: CPT

## 2018-03-28 RX ORDER — HEPARIN SODIUM (PORCINE) LOCK FLUSH IV SOLN 100 UNIT/ML 100 UNIT/ML
SOLUTION INTRAVENOUS
Status: COMPLETED
Start: 2018-03-28 | End: 2018-03-28

## 2018-03-28 RX ORDER — 0.9 % SODIUM CHLORIDE 0.9 %
10 VIAL (ML) INJECTION ONCE
Status: CANCELLED | OUTPATIENT
Start: 2018-03-28

## 2018-03-28 RX ORDER — HEPARIN SODIUM (PORCINE) LOCK FLUSH IV SOLN 100 UNIT/ML 100 UNIT/ML
5 SOLUTION INTRAVENOUS ONCE
Status: CANCELLED | OUTPATIENT
Start: 2018-03-28

## 2018-03-28 RX ORDER — HEPARIN SODIUM (PORCINE) LOCK FLUSH IV SOLN 100 UNIT/ML 100 UNIT/ML
5 SOLUTION INTRAVENOUS ONCE
Status: COMPLETED | OUTPATIENT
Start: 2018-03-28 | End: 2018-03-28

## 2018-03-28 RX ORDER — 0.9 % SODIUM CHLORIDE 0.9 %
VIAL (ML) INJECTION
Status: DISCONTINUED
Start: 2018-03-28 | End: 2018-03-28

## 2018-03-28 RX ORDER — OLMESARTAN MEDOXOMIL 20 MG/1
10 TABLET ORAL DAILY
COMMUNITY
End: 2018-12-18

## 2018-03-28 RX ADMIN — HEPARIN SODIUM (PORCINE) LOCK FLUSH IV SOLN 100 UNIT/ML 500 UNITS: 100 SOLUTION INTRAVENOUS at 15:08:00

## 2018-03-28 NOTE — PROGRESS NOTES
Port accessed using sterile technique with brisk blood return. Flushed with 20ml ns then 500 units heparin, then deaccessed. Site covered with 2x2 gauze and paper tape. No bleeding noted.     Mariam Nix has fu exam today, will schedule next port flush 6-8 we

## 2018-03-28 NOTE — PROGRESS NOTES
DAR       Reinaldo Tracey is a 63year old female who is here today for follow up of Malignant neoplasm of upper-outer quadrant of right breast in female, estrogen receptor negative (hcc)  (primary encounter diagnosis)     Pt here for follow up. Musculoskeletal: Positive for arthralgias (having joint stiffness, in the mornings.) and back pain (in the mornings. ). Negative for gait problem and myalgias. Stiffness    Skin: Negative for pallor and rash.         Nail changes    Allergic/Immunolog No date: OTHER      Comment: excision of lipoma at L arm and lower back    Social History  Social History   Marital status:   Spouse name: N/A    Years of education: N/A  Number of children: 0     Occupational History  Polish        Soci Pulmonary/Chest: Effort normal and breath sounds normal. No respiratory distress. She has no wheezes. Right breast exhibits no inverted nipple, no mass, no nipple discharge, no skin change and no tenderness.  Left breast exhibits no inverted nipple, no mass Monitoring cardiac toxicity:  EF still NL.  LV strain decreased. Will repeat TTE in 2 months, if improves, then next echo scheduled for Sept 2018 per herceptin protocol.       Mammogram of the R breast 6 due at the end of August 2018, chloe due to dense rusty -------------------------------------------------------------------------------  Study Conclusions    Left ventricle: The cavity size was normal. Wall thickness was increased in a  pattern of mild LVH.  Systolic function was normal. The estimated ejection Right ventricle:  The cavity size was normal.  Pulmonic valve:    The valve appears to be grossly normal.  Tricuspid valve:   Structurally normal valve.   Pericardium: Electa Irons was no pericardial effusion.    -------------------------------------------------- TECHNIQUE:    Full-field direct digital diagnostic mammography was performed and images were reviewed with the R2 GLENN [de-identified] CAD device. 3D tomosynthesis was performed and reviewed.       BREAST COMPOSITION:           CATEGORY c - The breasts are heterogen

## 2018-03-30 RX ORDER — CYCLOBENZAPRINE HCL 10 MG
10 TABLET ORAL 3 TIMES DAILY
Qty: 30 TABLET | Refills: 1 | Status: SHIPPED | OUTPATIENT
Start: 2018-03-30 | End: 2018-04-19

## 2018-04-30 ENCOUNTER — TELEPHONE (OUTPATIENT)
Dept: HEMATOLOGY/ONCOLOGY | Facility: HOSPITAL | Age: 63
End: 2018-04-30

## 2018-04-30 NOTE — TELEPHONE ENCOUNTER
Patient checking on prior auth for her cardiac TTE test.  I checked the referral and it is still pending. I informed Laine Vanessa we would be calling her back when it has been authorized and she can scheduled.

## 2018-05-03 ENCOUNTER — TELEPHONE (OUTPATIENT)
Dept: HEMATOLOGY/ONCOLOGY | Facility: HOSPITAL | Age: 63
End: 2018-05-03

## 2018-05-16 ENCOUNTER — OFFICE VISIT (OUTPATIENT)
Dept: INTERNAL MEDICINE CLINIC | Facility: CLINIC | Age: 63
End: 2018-05-16

## 2018-05-16 VITALS
BODY MASS INDEX: 26.84 KG/M2 | SYSTOLIC BLOOD PRESSURE: 126 MMHG | TEMPERATURE: 98 F | WEIGHT: 167 LBS | HEIGHT: 66 IN | HEART RATE: 74 BPM | DIASTOLIC BLOOD PRESSURE: 76 MMHG | OXYGEN SATURATION: 98 % | RESPIRATION RATE: 17 BRPM

## 2018-05-16 DIAGNOSIS — C50.011 MALIGNANT NEOPLASM OF AREOLA OF RIGHT BREAST IN FEMALE, UNSPECIFIED ESTROGEN RECEPTOR STATUS (HCC): Primary | ICD-10-CM

## 2018-05-16 DIAGNOSIS — E06.3 HASHIMOTO'S THYROIDITIS: ICD-10-CM

## 2018-05-16 DIAGNOSIS — E78.2 MIXED HYPERLIPIDEMIA: ICD-10-CM

## 2018-05-16 DIAGNOSIS — R00.2 HEART PALPITATIONS: ICD-10-CM

## 2018-05-16 PROCEDURE — 81002 URINALYSIS NONAUTO W/O SCOPE: CPT | Performed by: INTERNAL MEDICINE

## 2018-05-16 PROCEDURE — 84443 ASSAY THYROID STIM HORMONE: CPT | Performed by: INTERNAL MEDICINE

## 2018-05-16 PROCEDURE — 36415 COLL VENOUS BLD VENIPUNCTURE: CPT | Performed by: INTERNAL MEDICINE

## 2018-05-16 PROCEDURE — 99214 OFFICE O/P EST MOD 30 MIN: CPT | Performed by: INTERNAL MEDICINE

## 2018-05-16 PROCEDURE — 85025 COMPLETE CBC W/AUTO DIFF WBC: CPT | Performed by: INTERNAL MEDICINE

## 2018-05-16 PROCEDURE — 84439 ASSAY OF FREE THYROXINE: CPT | Performed by: INTERNAL MEDICINE

## 2018-05-16 PROCEDURE — 80053 COMPREHEN METABOLIC PANEL: CPT | Performed by: INTERNAL MEDICINE

## 2018-05-16 PROCEDURE — 84480 ASSAY TRIIODOTHYRONINE (T3): CPT | Performed by: INTERNAL MEDICINE

## 2018-05-16 RX ORDER — OXAZEPAM 10 MG
10 CAPSULE ORAL NIGHTLY PRN
Qty: 30 CAPSULE | Refills: 3 | Status: SHIPPED | OUTPATIENT
Start: 2018-05-16 | End: 2021-07-01

## 2018-05-16 NOTE — PROGRESS NOTES
Pt's  verified  Pt presented for a blood draw. Per DR Garcia Grandchild ordered TSHR CMP CBC IN OFFICE URINE DIP. Pt tolerated venipuncture well,specimens drawn from RT  arm  and sent out to [address] [place] [address] lab for testing.

## 2018-05-16 NOTE — PROGRESS NOTES
HPI:    Patient ID: Paresh Marquez is a 63year old female. Pt here for a fu on breast cancer, hypothyroidism and hyperlipidemia. Is in clinical remmision. Has labile bp - taking olmesartan rx.  Has occasional palpitation relieved by antianxiety mouth daily. Disp:  Rfl:    Coenzyme Q10 (COQ10 OR) Take by mouth daily. Disp:  Rfl:    LUTEIN-ZEAXANTHIN OR Take by mouth daily. Disp:  Rfl:    mometasone 0.1 % External Ointment Apply 1 Application topically daily.  Disp: 1 Tube Rfl: 1     Allergies: Wil Rubio MD  5/16/2018

## 2018-05-18 RX ORDER — LEVOTHYROXINE SODIUM 0.07 MG/1
75 TABLET ORAL
Qty: 90 TABLET | Refills: 3 | Status: SHIPPED | OUTPATIENT
Start: 2018-05-18 | End: 2018-10-19

## 2018-05-22 ENCOUNTER — TELEPHONE (OUTPATIENT)
Dept: HEMATOLOGY/ONCOLOGY | Facility: HOSPITAL | Age: 63
End: 2018-05-22

## 2018-05-22 ENCOUNTER — NURSE ONLY (OUTPATIENT)
Dept: HEMATOLOGY/ONCOLOGY | Facility: HOSPITAL | Age: 63
End: 2018-05-22
Attending: INTERNAL MEDICINE
Payer: MEDICAID

## 2018-05-22 DIAGNOSIS — Z17.1 MALIGNANT NEOPLASM OF UPPER-OUTER QUADRANT OF RIGHT BREAST IN FEMALE, ESTROGEN RECEPTOR NEGATIVE (HCC): Primary | ICD-10-CM

## 2018-05-22 DIAGNOSIS — C50.411 MALIGNANT NEOPLASM OF UPPER-OUTER QUADRANT OF RIGHT BREAST IN FEMALE, ESTROGEN RECEPTOR NEGATIVE (HCC): Primary | ICD-10-CM

## 2018-05-22 PROCEDURE — 96523 IRRIG DRUG DELIVERY DEVICE: CPT

## 2018-05-22 PROCEDURE — A4216 STERILE WATER/SALINE, 10 ML: HCPCS

## 2018-05-22 RX ORDER — HEPARIN SODIUM (PORCINE) LOCK FLUSH IV SOLN 100 UNIT/ML 100 UNIT/ML
5 SOLUTION INTRAVENOUS ONCE
Status: CANCELLED | OUTPATIENT
Start: 2018-05-22

## 2018-05-22 RX ORDER — HEPARIN SODIUM (PORCINE) LOCK FLUSH IV SOLN 100 UNIT/ML 100 UNIT/ML
5 SOLUTION INTRAVENOUS ONCE
Status: COMPLETED | OUTPATIENT
Start: 2018-05-22 | End: 2018-05-22

## 2018-05-22 RX ORDER — 0.9 % SODIUM CHLORIDE 0.9 %
10 VIAL (ML) INJECTION ONCE
Status: CANCELLED | OUTPATIENT
Start: 2018-05-22

## 2018-05-22 RX ORDER — 0.9 % SODIUM CHLORIDE 0.9 %
VIAL (ML) INJECTION
Status: DISCONTINUED
Start: 2018-05-22 | End: 2018-05-22

## 2018-05-22 RX ORDER — HEPARIN SODIUM (PORCINE) LOCK FLUSH IV SOLN 100 UNIT/ML 100 UNIT/ML
SOLUTION INTRAVENOUS
Status: COMPLETED
Start: 2018-05-22 | End: 2018-05-22

## 2018-05-22 RX ADMIN — HEPARIN SODIUM (PORCINE) LOCK FLUSH IV SOLN 100 UNIT/ML 500 UNITS: 100 SOLUTION INTRAVENOUS at 12:34:00

## 2018-05-22 NOTE — TELEPHONE ENCOUNTER
Jaswinder Sanchez is requesting a call back \"from Dr. Patrizia Fernández RN. I have questions regarding the use of my arm. \" I assured her I would send her request.

## 2018-05-22 NOTE — PROGRESS NOTES
Patient here for Port flush. Left chest port accessed per sterile technique, good blood return noted. Port flushed per protocol and shipley needle removed. Gauze and paper tape to site. Patient states she is finally feeling like herself again.   Discharge

## 2018-05-22 NOTE — TELEPHONE ENCOUNTER
Returned phone call and left message to call back if has questions and concerns [phone]. Informed that due to hx of right breast cancer and lumpectomy  to right arm should \"be careful and pamper right arm\". negative/UCG

## 2018-05-23 ENCOUNTER — APPOINTMENT (OUTPATIENT)
Dept: HEMATOLOGY/ONCOLOGY | Facility: HOSPITAL | Age: 63
End: 2018-05-23
Attending: INTERNAL MEDICINE
Payer: MEDICAID

## 2018-05-25 ENCOUNTER — HOSPITAL ENCOUNTER (OUTPATIENT)
Dept: CV DIAGNOSTICS | Facility: HOSPITAL | Age: 63
Discharge: HOME | End: 2018-05-25
Attending: INTERNAL MEDICINE
Payer: MEDICAID

## 2018-05-25 DIAGNOSIS — Z51.81 ENCOUNTER FOR MONITORING CARDIOTOXIC DRUG THERAPY: ICD-10-CM

## 2018-05-25 DIAGNOSIS — Z79.899 ENCOUNTER FOR MONITORING CARDIOTOXIC DRUG THERAPY: ICD-10-CM

## 2018-05-25 PROCEDURE — 93306 TTE W/DOPPLER COMPLETE: CPT | Performed by: INTERNAL MEDICINE

## 2018-06-19 ENCOUNTER — TELEPHONE (OUTPATIENT)
Dept: INTERNAL MEDICINE CLINIC | Facility: CLINIC | Age: 63
End: 2018-06-19

## 2018-07-10 ENCOUNTER — NURSE ONLY (OUTPATIENT)
Dept: HEMATOLOGY/ONCOLOGY | Facility: HOSPITAL | Age: 63
End: 2018-07-10
Attending: INTERNAL MEDICINE
Payer: MEDICAID

## 2018-07-10 DIAGNOSIS — C50.411 MALIGNANT NEOPLASM OF UPPER-OUTER QUADRANT OF RIGHT BREAST IN FEMALE, ESTROGEN RECEPTOR NEGATIVE (HCC): Primary | ICD-10-CM

## 2018-07-10 DIAGNOSIS — Z17.1 MALIGNANT NEOPLASM OF UPPER-OUTER QUADRANT OF RIGHT BREAST IN FEMALE, ESTROGEN RECEPTOR NEGATIVE (HCC): Primary | ICD-10-CM

## 2018-07-10 PROCEDURE — 96523 IRRIG DRUG DELIVERY DEVICE: CPT

## 2018-07-10 PROCEDURE — A4216 STERILE WATER/SALINE, 10 ML: HCPCS

## 2018-07-10 RX ORDER — HEPARIN SODIUM (PORCINE) LOCK FLUSH IV SOLN 100 UNIT/ML 100 UNIT/ML
5 SOLUTION INTRAVENOUS ONCE
Status: CANCELLED | OUTPATIENT
Start: 2018-07-10

## 2018-07-10 RX ORDER — HEPARIN SODIUM (PORCINE) LOCK FLUSH IV SOLN 100 UNIT/ML 100 UNIT/ML
SOLUTION INTRAVENOUS
Status: COMPLETED
Start: 2018-07-10 | End: 2018-07-10

## 2018-07-10 RX ORDER — 0.9 % SODIUM CHLORIDE 0.9 %
10 VIAL (ML) INJECTION ONCE
Status: CANCELLED | OUTPATIENT
Start: 2018-07-10

## 2018-07-10 RX ORDER — 0.9 % SODIUM CHLORIDE 0.9 %
VIAL (ML) INJECTION
Status: DISCONTINUED
Start: 2018-07-10 | End: 2018-07-10

## 2018-07-10 RX ORDER — HEPARIN SODIUM (PORCINE) LOCK FLUSH IV SOLN 100 UNIT/ML 100 UNIT/ML
5 SOLUTION INTRAVENOUS ONCE
Status: COMPLETED | OUTPATIENT
Start: 2018-07-10 | End: 2018-07-10

## 2018-07-10 RX ADMIN — HEPARIN SODIUM (PORCINE) LOCK FLUSH IV SOLN 100 UNIT/ML 500 UNITS: 100 SOLUTION INTRAVENOUS at 14:45:00

## 2018-07-10 NOTE — PROGRESS NOTES
Patient to center for port flush. Arrived ambulatory and without complaints. Port accessed, flushed well several times but no blood return noted. Patient does not want to stay for TPA today.  She is with a friend who has an appointment with RT and she need

## 2018-07-18 ENCOUNTER — APPOINTMENT (OUTPATIENT)
Dept: HEMATOLOGY/ONCOLOGY | Facility: HOSPITAL | Age: 63
End: 2018-07-18
Attending: INTERNAL MEDICINE
Payer: MEDICAID

## 2018-08-07 ENCOUNTER — TELEPHONE (OUTPATIENT)
Dept: SURGERY | Facility: CLINIC | Age: 63
End: 2018-08-07

## 2018-08-07 NOTE — TELEPHONE ENCOUNTER
Pt had phoned in and LVM asking what she is supposed to do next for imaging. Chart reviewed and she is due right sided mammogram in Sept, the order is in the system.    Instructed to call central scheduling to schedule in Sept.

## 2018-09-11 ENCOUNTER — OFFICE VISIT (OUTPATIENT)
Dept: HEMATOLOGY/ONCOLOGY | Facility: HOSPITAL | Age: 63
End: 2018-09-11
Attending: INTERNAL MEDICINE
Payer: MEDICAID

## 2018-09-11 ENCOUNTER — HOSPITAL ENCOUNTER (OUTPATIENT)
Dept: MAMMOGRAPHY | Facility: HOSPITAL | Age: 63
Discharge: HOME | End: 2018-09-11
Attending: INTERNAL MEDICINE
Payer: MEDICAID

## 2018-09-11 VITALS
SYSTOLIC BLOOD PRESSURE: 111 MMHG | HEART RATE: 51 BPM | DIASTOLIC BLOOD PRESSURE: 67 MMHG | BODY MASS INDEX: 27.32 KG/M2 | HEIGHT: 66 IN | TEMPERATURE: 98 F | RESPIRATION RATE: 16 BRPM | WEIGHT: 170 LBS

## 2018-09-11 DIAGNOSIS — R92.2 DENSE BREASTS: ICD-10-CM

## 2018-09-11 DIAGNOSIS — C50.411 MALIGNANT NEOPLASM OF UPPER-OUTER QUADRANT OF RIGHT BREAST IN FEMALE, ESTROGEN RECEPTOR NEGATIVE (HCC): Primary | ICD-10-CM

## 2018-09-11 DIAGNOSIS — C50.411 MALIGNANT NEOPLASM OF UPPER-OUTER QUADRANT OF RIGHT BREAST IN FEMALE, ESTROGEN RECEPTOR NEGATIVE (HCC): ICD-10-CM

## 2018-09-11 DIAGNOSIS — Z17.1 MALIGNANT NEOPLASM OF UPPER-OUTER QUADRANT OF RIGHT BREAST IN FEMALE, ESTROGEN RECEPTOR NEGATIVE (HCC): Primary | ICD-10-CM

## 2018-09-11 DIAGNOSIS — Z51.81 ENCOUNTER FOR MONITORING CARDIOTOXIC DRUG THERAPY: ICD-10-CM

## 2018-09-11 DIAGNOSIS — Z17.1 MALIGNANT NEOPLASM OF UPPER-OUTER QUADRANT OF RIGHT BREAST IN FEMALE, ESTROGEN RECEPTOR NEGATIVE (HCC): ICD-10-CM

## 2018-09-11 DIAGNOSIS — Z79.899 ENCOUNTER FOR MONITORING CARDIOTOXIC DRUG THERAPY: ICD-10-CM

## 2018-09-11 DIAGNOSIS — I89.0 LYMPHEDEMA OF RIGHT UPPER EXTREMITY: ICD-10-CM

## 2018-09-11 DIAGNOSIS — R92.2 DENSE BREAST: ICD-10-CM

## 2018-09-11 PROCEDURE — 99214 OFFICE O/P EST MOD 30 MIN: CPT | Performed by: INTERNAL MEDICINE

## 2018-09-11 PROCEDURE — A4216 STERILE WATER/SALINE, 10 ML: HCPCS

## 2018-09-11 PROCEDURE — 77061 BREAST TOMOSYNTHESIS UNI: CPT | Performed by: INTERNAL MEDICINE

## 2018-09-11 PROCEDURE — 77065 DX MAMMO INCL CAD UNI: CPT | Performed by: INTERNAL MEDICINE

## 2018-09-11 PROCEDURE — 36593 DECLOT VASCULAR DEVICE: CPT

## 2018-09-11 RX ORDER — 0.9 % SODIUM CHLORIDE 0.9 %
10 VIAL (ML) INJECTION ONCE
Status: CANCELLED | OUTPATIENT
Start: 2018-09-11

## 2018-09-11 RX ORDER — HEPARIN SODIUM (PORCINE) LOCK FLUSH IV SOLN 100 UNIT/ML 100 UNIT/ML
5 SOLUTION INTRAVENOUS ONCE
Status: CANCELLED | OUTPATIENT
Start: 2018-09-11

## 2018-09-11 RX ORDER — 0.9 % SODIUM CHLORIDE 0.9 %
VIAL (ML) INJECTION
Status: DISCONTINUED
Start: 2018-09-11 | End: 2018-09-11

## 2018-09-11 RX ORDER — HEPARIN SODIUM (PORCINE) LOCK FLUSH IV SOLN 100 UNIT/ML 100 UNIT/ML
SOLUTION INTRAVENOUS
Status: COMPLETED
Start: 2018-09-11 | End: 2018-09-11

## 2018-09-11 RX ORDER — HEPARIN SODIUM (PORCINE) LOCK FLUSH IV SOLN 100 UNIT/ML 100 UNIT/ML
5 SOLUTION INTRAVENOUS ONCE
Status: COMPLETED | OUTPATIENT
Start: 2018-09-11 | End: 2018-09-11

## 2018-09-11 RX ADMIN — HEPARIN SODIUM (PORCINE) LOCK FLUSH IV SOLN 100 UNIT/ML 500 UNITS: 100 SOLUTION INTRAVENOUS at 12:25:00

## 2018-09-11 NOTE — PROGRESS NOTES
DAR       Sudeep Joseph is a 66year old female who is here today for follow up of Malignant neoplasm of upper-outer quadrant of right breast in female, estrogen receptor negative (hcc)  (primary encounter diagnosis)  Encounter for monitoring card Skin: Negative for pallor and rash. Allergic/Immunologic: Positive for environmental allergies. Neurological: Negative for dizziness, weakness, light-headedness, numbness (resolved) and headaches. Hematological: Negative for adenopathy.  Does not brui • Personal history of antineoplastic chemotherapy     TCHP 6 cycles (3 completed), Herceptin every 3 weeks (last 2/22/17)     Past Surgical History:  No date: DILATION/CURETTAGE,DIAGNOSTIC  No date: LUMPECTOMY RIGHT  No date: OTHER      Comment:  excision /67 (BP Location: Left arm, Patient Position: Sitting, Cuff Size: adult)   Pulse 51   Temp 98.2 °F (36.8 °C) (Oral)   Resp 16   Ht 1.676 m (5' 6\")   Wt 77.1 kg (170 lb)   BMI 27.44 kg/m²   Wt Readings from Last 6 Encounters:  09/11/18 : 77.1 kg (17 Right: No inguinal and no supraclavicular adenopathy present. Left: No inguinal and no supraclavicular adenopathy present. Neurological: She is alert and oriented to person, place, and time. Gait normal.   Skin: No rash noted. No erythema. No orders of the defined types were placed in this encounter.     Narrative                             * [facility]*    -------------------------------------------------------------------------------         Transthoracic Echocardiography was adequate. The study was technically limited due to body habitus. Scanning  was performed from the parasternal, apical, and subcostal acoustic windows. Study completion:  The patient tolerated the procedure well. There were no  complications.  Transthor Root diameter, ED                                41 mm     ------  Left atrium  Anterior-posterior dimension                     32 mm     ------  Anterior-posterior dimension index              1.7 cm/m^2 <2.2  Legend:  Mean values are shown as u=mean lavern stable with no new suspicious asymmetry, mass, architectural distortion or microcalcifications identified.      Dictated by (CST): Lynette Brunner, MD on 2/27/2018 at 12:17       Approved by (CST): Lynette Brunner, MD on 2/27/2018 at 12:24          =====  CONCLUS

## 2018-09-11 NOTE — PROGRESS NOTES
Pt to lab for routine port flush. Port access and flushed several times with no blood return. Pt repositioned with no blood return established. TPA instilled and dwelled for approximately 60 minutes per protocol.   Blood return established after TPA admi

## 2018-09-18 ENCOUNTER — TELEPHONE (OUTPATIENT)
Dept: INTERNAL MEDICINE CLINIC | Facility: CLINIC | Age: 63
End: 2018-09-18

## 2018-09-18 NOTE — TELEPHONE ENCOUNTER
Dr. Roc Mcintyre, Olmesartan 40mg is not a covered medication under patient's plan. Would you like to send an alternative or would you like me to start a PA?

## 2018-10-19 RX ORDER — LEVOTHYROXINE SODIUM 0.1 MG/1
100 TABLET ORAL DAILY
Qty: 90 TABLET | Refills: 3 | Status: SHIPPED | OUTPATIENT
Start: 2018-10-19 | End: 2019-10-29

## 2018-11-09 RX ORDER — 0.9 % SODIUM CHLORIDE 0.9 %
10 VIAL (ML) INJECTION ONCE
Status: CANCELLED | OUTPATIENT
Start: 2018-11-09

## 2018-11-09 RX ORDER — HEPARIN SODIUM (PORCINE) LOCK FLUSH IV SOLN 100 UNIT/ML 100 UNIT/ML
5 SOLUTION INTRAVENOUS ONCE
Status: CANCELLED | OUTPATIENT
Start: 2018-11-09

## 2018-11-12 ENCOUNTER — APPOINTMENT (OUTPATIENT)
Dept: HEMATOLOGY/ONCOLOGY | Facility: HOSPITAL | Age: 63
End: 2018-11-12
Attending: INTERNAL MEDICINE
Payer: MEDICAID

## 2018-11-21 ENCOUNTER — NURSE ONLY (OUTPATIENT)
Dept: HEMATOLOGY/ONCOLOGY | Facility: HOSPITAL | Age: 63
End: 2018-11-21
Attending: INTERNAL MEDICINE
Payer: MEDICAID

## 2018-11-21 DIAGNOSIS — Z17.1 MALIGNANT NEOPLASM OF UPPER-OUTER QUADRANT OF RIGHT BREAST IN FEMALE, ESTROGEN RECEPTOR NEGATIVE (HCC): Primary | ICD-10-CM

## 2018-11-21 DIAGNOSIS — C50.411 MALIGNANT NEOPLASM OF UPPER-OUTER QUADRANT OF RIGHT BREAST IN FEMALE, ESTROGEN RECEPTOR NEGATIVE (HCC): Primary | ICD-10-CM

## 2018-11-21 PROCEDURE — A4216 STERILE WATER/SALINE, 10 ML: HCPCS

## 2018-11-21 PROCEDURE — 96523 IRRIG DRUG DELIVERY DEVICE: CPT

## 2018-11-21 RX ORDER — 0.9 % SODIUM CHLORIDE 0.9 %
VIAL (ML) INJECTION
Status: DISCONTINUED
Start: 2018-11-21 | End: 2018-11-21

## 2018-11-21 RX ORDER — HEPARIN SODIUM (PORCINE) LOCK FLUSH IV SOLN 100 UNIT/ML 100 UNIT/ML
5 SOLUTION INTRAVENOUS ONCE
Status: COMPLETED | OUTPATIENT
Start: 2018-11-21 | End: 2018-11-21

## 2018-11-21 RX ORDER — HEPARIN SODIUM (PORCINE) LOCK FLUSH IV SOLN 100 UNIT/ML 100 UNIT/ML
SOLUTION INTRAVENOUS
Status: DISCONTINUED
Start: 2018-11-21 | End: 2018-11-21

## 2018-11-21 RX ORDER — HEPARIN SODIUM (PORCINE) LOCK FLUSH IV SOLN 100 UNIT/ML 100 UNIT/ML
5 SOLUTION INTRAVENOUS ONCE
Status: CANCELLED | OUTPATIENT
Start: 2018-11-21

## 2018-11-21 RX ORDER — 0.9 % SODIUM CHLORIDE 0.9 %
10 VIAL (ML) INJECTION ONCE
Status: CANCELLED | OUTPATIENT
Start: 2018-11-21

## 2018-11-21 RX ADMIN — HEPARIN SODIUM (PORCINE) LOCK FLUSH IV SOLN 100 UNIT/ML 500 UNITS: 100 SOLUTION INTRAVENOUS at 11:10:00

## 2018-12-26 RX ORDER — ROSUVASTATIN CALCIUM 20 MG/1
20 TABLET, COATED ORAL NIGHTLY
Qty: 90 TABLET | Refills: 3 | Status: SHIPPED | OUTPATIENT
Start: 2018-12-26 | End: 2020-10-15

## 2018-12-26 RX ORDER — LEVOFLOXACIN 500 MG/1
500 TABLET, FILM COATED ORAL DAILY
Qty: 10 TABLET | Refills: 0 | Status: SHIPPED | OUTPATIENT
Start: 2018-12-26 | End: 2019-01-05

## 2018-12-28 ENCOUNTER — HOSPITAL ENCOUNTER (OUTPATIENT)
Dept: CV DIAGNOSTICS | Facility: HOSPITAL | Age: 63
Discharge: HOME | End: 2018-12-28
Attending: INTERNAL MEDICINE
Payer: MEDICAID

## 2018-12-28 DIAGNOSIS — C50.012 MALIGNANT NEOPLASM OF NIPPLE OF LEFT BREAST IN FEMALE, UNSPECIFIED ESTROGEN RECEPTOR STATUS (HCC): ICD-10-CM

## 2018-12-28 PROCEDURE — 93306 TTE W/DOPPLER COMPLETE: CPT | Performed by: INTERNAL MEDICINE

## 2019-01-23 ENCOUNTER — NURSE ONLY (OUTPATIENT)
Dept: INTERNAL MEDICINE CLINIC | Facility: CLINIC | Age: 64
End: 2019-01-23
Payer: MEDICAID

## 2019-01-23 ENCOUNTER — NURSE ONLY (OUTPATIENT)
Dept: HEMATOLOGY/ONCOLOGY | Facility: HOSPITAL | Age: 64
End: 2019-01-23
Attending: INTERNAL MEDICINE
Payer: MEDICAID

## 2019-01-23 DIAGNOSIS — Z17.1 MALIGNANT NEOPLASM OF UPPER-OUTER QUADRANT OF RIGHT BREAST IN FEMALE, ESTROGEN RECEPTOR NEGATIVE (HCC): Primary | ICD-10-CM

## 2019-01-23 DIAGNOSIS — C50.411 MALIGNANT NEOPLASM OF UPPER-OUTER QUADRANT OF RIGHT BREAST IN FEMALE, ESTROGEN RECEPTOR NEGATIVE (HCC): Primary | ICD-10-CM

## 2019-01-23 DIAGNOSIS — E03.8 OTHER SPECIFIED HYPOTHYROIDISM: ICD-10-CM

## 2019-01-23 LAB
ALBUMIN SERPL BCP-MCNC: 4.1 G/DL (ref 3.5–4.8)
ALBUMIN/GLOB SERPL: 1.6 {RATIO} (ref 1–2)
ALP SERPL-CCNC: 59 U/L (ref 32–100)
ALT SERPL-CCNC: 16 U/L (ref 14–54)
ANION GAP SERPL CALC-SCNC: 12 MMOL/L (ref 0–18)
AST SERPL-CCNC: 23 U/L (ref 15–41)
BASOPHILS # BLD: 0.1 K/UL (ref 0–0.2)
BASOPHILS NFR BLD: 1 %
BILIRUB SERPL-MCNC: 0.4 MG/DL (ref 0.3–1.2)
BUN SERPL-MCNC: 13 MG/DL (ref 8–20)
BUN/CREAT SERPL: 20 (ref 10–20)
CALCIUM SERPL-MCNC: 9.5 MG/DL (ref 8.5–10.5)
CHLORIDE SERPL-SCNC: 98 MMOL/L (ref 95–110)
CO2 SERPL-SCNC: 25 MMOL/L (ref 22–32)
CREAT SERPL-MCNC: 0.65 MG/DL (ref 0.5–1.5)
EOSINOPHIL # BLD: 0.1 K/UL (ref 0–0.7)
EOSINOPHIL NFR BLD: 2 %
ERYTHROCYTE [DISTWIDTH] IN BLOOD BY AUTOMATED COUNT: 15.8 % (ref 11–15)
GLOBULIN PLAS-MCNC: 2.6 G/DL (ref 2.5–3.7)
GLUCOSE SERPL-MCNC: 91 MG/DL (ref 70–99)
HCT VFR BLD AUTO: 41.8 % (ref 35–48)
HGB BLD-MCNC: 13.9 G/DL (ref 12–16)
LYMPHOCYTES # BLD: 1.2 K/UL (ref 1–4)
LYMPHOCYTES NFR BLD: 22 %
MCH RBC QN AUTO: 32.2 PG (ref 27–32)
MCHC RBC AUTO-ENTMCNC: 33.3 G/DL (ref 32–37)
MCV RBC AUTO: 96.6 FL (ref 80–100)
MONOCYTES # BLD: 0.6 K/UL (ref 0–1)
MONOCYTES NFR BLD: 10 %
NEUTROPHILS # BLD AUTO: 3.6 K/UL (ref 1.8–7.7)
NEUTROPHILS NFR BLD: 65 %
OSMOLALITY UR CALC.SUM OF ELEC: 280 MOSM/KG (ref 275–295)
PATIENT FASTING: NO
PLATELET # BLD AUTO: 223 K/UL (ref 140–400)
PMV BLD AUTO: 9.1 FL (ref 7.4–10.3)
POTASSIUM SERPL-SCNC: 4.2 MMOL/L (ref 3.3–5.1)
PROT SERPL-MCNC: 6.7 G/DL (ref 5.9–8.4)
RBC # BLD AUTO: 4.33 M/UL (ref 3.7–5.4)
SODIUM SERPL-SCNC: 135 MMOL/L (ref 136–144)
TSH SERPL-ACNC: 4.18 UIU/ML (ref 0.45–5.33)
WBC # BLD AUTO: 5.6 K/UL (ref 4–11)

## 2019-01-23 PROCEDURE — 96523 IRRIG DRUG DELIVERY DEVICE: CPT

## 2019-01-23 PROCEDURE — 36415 COLL VENOUS BLD VENIPUNCTURE: CPT | Performed by: INTERNAL MEDICINE

## 2019-01-23 PROCEDURE — 80053 COMPREHEN METABOLIC PANEL: CPT | Performed by: INTERNAL MEDICINE

## 2019-01-23 PROCEDURE — 85025 COMPLETE CBC W/AUTO DIFF WBC: CPT | Performed by: INTERNAL MEDICINE

## 2019-01-23 PROCEDURE — 84443 ASSAY THYROID STIM HORMONE: CPT | Performed by: INTERNAL MEDICINE

## 2019-01-23 RX ORDER — 0.9 % SODIUM CHLORIDE 0.9 %
10 VIAL (ML) INJECTION ONCE
Status: CANCELLED | OUTPATIENT
Start: 2019-01-23

## 2019-01-23 RX ORDER — HEPARIN SODIUM (PORCINE) LOCK FLUSH IV SOLN 100 UNIT/ML 100 UNIT/ML
5 SOLUTION INTRAVENOUS ONCE
Status: COMPLETED | OUTPATIENT
Start: 2019-01-23 | End: 2019-01-23

## 2019-01-23 RX ORDER — HEPARIN SODIUM (PORCINE) LOCK FLUSH IV SOLN 100 UNIT/ML 100 UNIT/ML
SOLUTION INTRAVENOUS
Status: COMPLETED
Start: 2019-01-23 | End: 2019-01-23

## 2019-01-23 RX ORDER — 0.9 % SODIUM CHLORIDE 0.9 %
VIAL (ML) INJECTION
Status: DISCONTINUED
Start: 2019-01-23 | End: 2019-01-23

## 2019-01-23 RX ORDER — HEPARIN SODIUM (PORCINE) LOCK FLUSH IV SOLN 100 UNIT/ML 100 UNIT/ML
5 SOLUTION INTRAVENOUS ONCE
Status: CANCELLED | OUTPATIENT
Start: 2019-01-23

## 2019-01-23 RX ADMIN — HEPARIN SODIUM (PORCINE) LOCK FLUSH IV SOLN 100 UNIT/ML 500 UNITS: 100 SOLUTION INTRAVENOUS at 11:27:00

## 2019-01-23 NOTE — PROGRESS NOTES
Patient presents for blood draw. Per Dr. Aleisha Jenkins orders, CMP, CBC, and TSH were drawn. Patient tolerated and left the office without complaints.

## 2019-02-25 RX ORDER — GABAPENTIN 300 MG/1
CAPSULE ORAL
Qty: 90 CAPSULE | Refills: 11 | Status: SHIPPED | OUTPATIENT
Start: 2019-02-25 | End: 2020-02-26

## 2019-02-26 NOTE — TELEPHONE ENCOUNTER
Requested Prescriptions     Pending Prescriptions Disp Refills   • GABAPENTIN 300 MG Oral Cap [Pharmacy Med Name: GABAPENTIN 300MG    CAP] 90 capsule 11     Sig: TAKE 1 CAPSULE BY MOUTH THREE TIMES DAILY     Last office visit: 3-7-17  Medication last refil

## 2019-03-12 ENCOUNTER — OFFICE VISIT (OUTPATIENT)
Dept: HEMATOLOGY/ONCOLOGY | Facility: HOSPITAL | Age: 64
End: 2019-03-12
Attending: INTERNAL MEDICINE
Payer: MEDICAID

## 2019-03-12 VITALS
SYSTOLIC BLOOD PRESSURE: 145 MMHG | TEMPERATURE: 97 F | HEART RATE: 67 BPM | RESPIRATION RATE: 18 BRPM | BODY MASS INDEX: 27 KG/M2 | WEIGHT: 168 LBS | DIASTOLIC BLOOD PRESSURE: 90 MMHG | HEIGHT: 66 IN

## 2019-03-12 DIAGNOSIS — T45.1X5A PERIPHERAL NEUROPATHY DUE TO CHEMOTHERAPY (HCC): ICD-10-CM

## 2019-03-12 DIAGNOSIS — Z17.1 MALIGNANT NEOPLASM OF UPPER-OUTER QUADRANT OF RIGHT BREAST IN FEMALE, ESTROGEN RECEPTOR NEGATIVE (HCC): Primary | ICD-10-CM

## 2019-03-12 DIAGNOSIS — C50.411 MALIGNANT NEOPLASM OF UPPER-OUTER QUADRANT OF RIGHT BREAST IN FEMALE, ESTROGEN RECEPTOR NEGATIVE (HCC): Primary | ICD-10-CM

## 2019-03-12 DIAGNOSIS — Z79.899 ENCOUNTER FOR MONITORING CARDIOTOXIC DRUG THERAPY: ICD-10-CM

## 2019-03-12 DIAGNOSIS — G62.0 PERIPHERAL NEUROPATHY DUE TO CHEMOTHERAPY (HCC): ICD-10-CM

## 2019-03-12 DIAGNOSIS — Z51.81 ENCOUNTER FOR MONITORING CARDIOTOXIC DRUG THERAPY: ICD-10-CM

## 2019-03-12 PROCEDURE — 96523 IRRIG DRUG DELIVERY DEVICE: CPT

## 2019-03-12 PROCEDURE — 99214 OFFICE O/P EST MOD 30 MIN: CPT | Performed by: INTERNAL MEDICINE

## 2019-03-12 RX ORDER — 0.9 % SODIUM CHLORIDE 0.9 %
VIAL (ML) INJECTION
Status: DISCONTINUED
Start: 2019-03-12 | End: 2019-03-12

## 2019-03-12 RX ORDER — HEPARIN SODIUM (PORCINE) LOCK FLUSH IV SOLN 100 UNIT/ML 100 UNIT/ML
5 SOLUTION INTRAVENOUS ONCE
Status: CANCELLED | OUTPATIENT
Start: 2019-03-12

## 2019-03-12 RX ORDER — 0.9 % SODIUM CHLORIDE 0.9 %
10 VIAL (ML) INJECTION ONCE
Status: CANCELLED | OUTPATIENT
Start: 2019-03-12

## 2019-03-12 RX ORDER — HEPARIN SODIUM (PORCINE) LOCK FLUSH IV SOLN 100 UNIT/ML 100 UNIT/ML
5 SOLUTION INTRAVENOUS ONCE
Status: COMPLETED | OUTPATIENT
Start: 2019-03-12 | End: 2019-03-12

## 2019-03-12 RX ADMIN — HEPARIN SODIUM (PORCINE) LOCK FLUSH IV SOLN 100 UNIT/ML 500 UNITS: 100 SOLUTION INTRAVENOUS at 12:00:00

## 2019-03-12 NOTE — PROGRESS NOTES
HPI       Kris Imus is a 66year old female who is here today for follow up of Malignant neoplasm of upper-outer quadrant of right breast in female, estrogen receptor negative (hcc)  (primary encounter diagnosis)  Encounter for monitoring card Psychiatric/Behavioral: Negative for sleep disturbance.            Current Outpatient Medications:  GABAPENTIN 300 MG Oral Cap TAKE 1 CAPSULE BY MOUTH THREE TIMES DAILY Disp: 90 capsule Rfl: 11   Rosuvastatin Calcium 20 MG Oral Tab Take 1 tablet (20 mg tota Performed by Sherill Peabody, MD at [facility] MAIN OR     Social History    Socioeconomic History      Marital status:       Spouse name: Not on file      Number of children: 0      Years of education: Not on file      Highest education level: Not on Back Care: Not Asked        Bike Helmet: Not Asked        Self-Exams: Not Asked    Social History Narrative      Not on file    Family History   Problem Relation Age of Onset   • Diabetes Mother    • Heart Disorder Mother         pacmaker   • Other ( Right: No supraclavicular adenopathy present. Left: No supraclavicular adenopathy present. Neurological: She is alert and oriented to person, place, and time. Gait normal.   Skin: No rash noted. No erythema.    Psychiatric: She has a normal mo No results found for this or any previous visit (from the past 48 hour(s)). Imaging & Referrals:  None   No orders of the defined types were placed in this encounter.

## 2019-03-26 ENCOUNTER — HOSPITAL ENCOUNTER (OUTPATIENT)
Dept: MAMMOGRAPHY | Facility: HOSPITAL | Age: 64
Discharge: HOME | End: 2019-03-26
Attending: INTERNAL MEDICINE
Payer: MEDICAID

## 2019-03-26 DIAGNOSIS — R92.2 DENSE BREAST: ICD-10-CM

## 2019-03-26 DIAGNOSIS — C50.411 MALIGNANT NEOPLASM OF UPPER-OUTER QUADRANT OF RIGHT BREAST IN FEMALE, ESTROGEN RECEPTOR NEGATIVE (HCC): ICD-10-CM

## 2019-03-26 DIAGNOSIS — Z17.1 MALIGNANT NEOPLASM OF UPPER-OUTER QUADRANT OF RIGHT BREAST IN FEMALE, ESTROGEN RECEPTOR NEGATIVE (HCC): ICD-10-CM

## 2019-03-26 PROCEDURE — 77062 BREAST TOMOSYNTHESIS BI: CPT | Performed by: INTERNAL MEDICINE

## 2019-03-26 PROCEDURE — 77066 DX MAMMO INCL CAD BI: CPT | Performed by: INTERNAL MEDICINE

## 2019-04-18 ENCOUNTER — HOSPITAL ENCOUNTER (OUTPATIENT)
Dept: CV DIAGNOSTICS | Facility: HOSPITAL | Age: 64
Discharge: HOME | End: 2019-04-18
Attending: INTERNAL MEDICINE
Payer: MEDICAID

## 2019-04-18 DIAGNOSIS — Z17.1 MALIGNANT NEOPLASM OF AREOLA OF RIGHT BREAST IN FEMALE, ESTROGEN RECEPTOR NEGATIVE (HCC): ICD-10-CM

## 2019-04-18 DIAGNOSIS — C50.011 MALIGNANT NEOPLASM OF AREOLA OF RIGHT BREAST IN FEMALE, ESTROGEN RECEPTOR NEGATIVE (HCC): ICD-10-CM

## 2019-04-18 PROCEDURE — 93306 TTE W/DOPPLER COMPLETE: CPT | Performed by: INTERNAL MEDICINE

## 2019-05-07 ENCOUNTER — NURSE ONLY (OUTPATIENT)
Dept: HEMATOLOGY/ONCOLOGY | Facility: HOSPITAL | Age: 64
End: 2019-05-07
Attending: INTERNAL MEDICINE
Payer: MEDICAID

## 2019-05-07 DIAGNOSIS — C50.411 MALIGNANT NEOPLASM OF UPPER-OUTER QUADRANT OF RIGHT BREAST IN FEMALE, ESTROGEN RECEPTOR NEGATIVE (HCC): Primary | ICD-10-CM

## 2019-05-07 DIAGNOSIS — Z17.1 MALIGNANT NEOPLASM OF UPPER-OUTER QUADRANT OF RIGHT BREAST IN FEMALE, ESTROGEN RECEPTOR NEGATIVE (HCC): Primary | ICD-10-CM

## 2019-05-07 PROCEDURE — 96523 IRRIG DRUG DELIVERY DEVICE: CPT

## 2019-05-07 RX ORDER — 0.9 % SODIUM CHLORIDE 0.9 %
10 VIAL (ML) INJECTION ONCE
Status: CANCELLED | OUTPATIENT
Start: 2019-05-07

## 2019-05-07 RX ORDER — HEPARIN SODIUM (PORCINE) LOCK FLUSH IV SOLN 100 UNIT/ML 100 UNIT/ML
5 SOLUTION INTRAVENOUS ONCE
Status: CANCELLED | OUTPATIENT
Start: 2019-05-07

## 2019-05-07 RX ORDER — 0.9 % SODIUM CHLORIDE 0.9 %
VIAL (ML) INJECTION
Status: DISCONTINUED
Start: 2019-05-07 | End: 2019-05-07

## 2019-05-07 RX ORDER — HEPARIN SODIUM (PORCINE) LOCK FLUSH IV SOLN 100 UNIT/ML 100 UNIT/ML
5 SOLUTION INTRAVENOUS ONCE
Status: COMPLETED | OUTPATIENT
Start: 2019-05-07 | End: 2019-05-07

## 2019-05-07 RX ADMIN — HEPARIN SODIUM (PORCINE) LOCK FLUSH IV SOLN 100 UNIT/ML 500 UNITS: 100 SOLUTION INTRAVENOUS at 10:40:00

## 2019-05-07 NOTE — PROGRESS NOTES
Patient here for Port flush. Patient without any complaints, feeling well. Left chest port accessed, brisk blood return noted. Port flushed per protocol then shipley needle removed. Site covered with gauze and paper tape.   Discharged ambulatory with a fu

## 2019-07-02 ENCOUNTER — NURSE ONLY (OUTPATIENT)
Dept: HEMATOLOGY/ONCOLOGY | Facility: HOSPITAL | Age: 64
End: 2019-07-02
Attending: INTERNAL MEDICINE
Payer: MEDICAID

## 2019-07-02 DIAGNOSIS — Z17.1 MALIGNANT NEOPLASM OF UPPER-OUTER QUADRANT OF RIGHT BREAST IN FEMALE, ESTROGEN RECEPTOR NEGATIVE (HCC): Primary | ICD-10-CM

## 2019-07-02 DIAGNOSIS — E03.9 ACQUIRED HYPOTHYROIDISM: ICD-10-CM

## 2019-07-02 DIAGNOSIS — E78.2 MIXED HYPERLIPIDEMIA: ICD-10-CM

## 2019-07-02 DIAGNOSIS — R53.82 CHRONIC FATIGUE: ICD-10-CM

## 2019-07-02 DIAGNOSIS — C50.411 MALIGNANT NEOPLASM OF UPPER-OUTER QUADRANT OF RIGHT BREAST IN FEMALE, ESTROGEN RECEPTOR NEGATIVE (HCC): Primary | ICD-10-CM

## 2019-07-02 LAB
ALBUMIN SERPL-MCNC: 3.6 G/DL (ref 3.4–5)
ALBUMIN/GLOB SERPL: 1.2 {RATIO} (ref 1–2)
ALP LIVER SERPL-CCNC: 67 U/L (ref 50–130)
ALT SERPL-CCNC: 18 U/L (ref 13–56)
ANION GAP SERPL CALC-SCNC: 6 MMOL/L (ref 0–18)
AST SERPL-CCNC: 17 U/L (ref 15–37)
BASOPHILS # BLD AUTO: 0.04 X10(3) UL (ref 0–0.2)
BASOPHILS NFR BLD AUTO: 1 %
BILIRUB SERPL-MCNC: 0.4 MG/DL (ref 0.1–2)
BUN BLD-MCNC: 10 MG/DL (ref 7–18)
BUN/CREAT SERPL: 16.7 (ref 10–20)
CALCIUM BLD-MCNC: 9.1 MG/DL (ref 8.5–10.1)
CHLORIDE SERPL-SCNC: 103 MMOL/L (ref 98–112)
CHOLEST SMN-MCNC: 167 MG/DL (ref ?–200)
CO2 SERPL-SCNC: 26 MMOL/L (ref 21–32)
CREAT BLD-MCNC: 0.6 MG/DL (ref 0.55–1.02)
DEPRECATED RDW RBC AUTO: 50.8 FL (ref 35.1–46.3)
EOSINOPHIL # BLD AUTO: 0.1 X10(3) UL (ref 0–0.7)
EOSINOPHIL NFR BLD AUTO: 2.4 %
ERYTHROCYTE [DISTWIDTH] IN BLOOD BY AUTOMATED COUNT: 15 % (ref 11–15)
GLOBULIN PLAS-MCNC: 3.1 G/DL (ref 2.8–4.4)
GLUCOSE BLD-MCNC: 95 MG/DL (ref 70–99)
HCT VFR BLD AUTO: 37.9 % (ref 35–48)
HDLC SERPL-MCNC: 65 MG/DL (ref 40–59)
HGB BLD-MCNC: 12.6 G/DL (ref 12–16)
IMM GRANULOCYTES # BLD AUTO: 0.01 X10(3) UL (ref 0–1)
IMM GRANULOCYTES NFR BLD: 0.2 %
LDLC SERPL CALC-MCNC: 84 MG/DL (ref ?–100)
LYMPHOCYTES # BLD AUTO: 0.94 X10(3) UL (ref 1–4)
LYMPHOCYTES NFR BLD AUTO: 22.4 %
M PROTEIN MFR SERPL ELPH: 6.7 G/DL (ref 6.4–8.2)
MCH RBC QN AUTO: 30.7 PG (ref 26–34)
MCHC RBC AUTO-ENTMCNC: 33.2 G/DL (ref 31–37)
MCV RBC AUTO: 92.2 FL (ref 80–100)
MONOCYTES # BLD AUTO: 0.5 X10(3) UL (ref 0.1–1)
MONOCYTES NFR BLD AUTO: 11.9 %
NEUTROPHILS # BLD AUTO: 2.61 X10 (3) UL (ref 1.5–7.7)
NEUTROPHILS # BLD AUTO: 2.61 X10(3) UL (ref 1.5–7.7)
NEUTROPHILS NFR BLD AUTO: 62.1 %
NONHDLC SERPL-MCNC: 102 MG/DL (ref ?–130)
OSMOLALITY SERPL CALC.SUM OF ELEC: 279 MOSM/KG (ref 275–295)
PATIENT FASTING: NO
PATIENT FASTING: NO
PLATELET # BLD AUTO: 228 10(3)UL (ref 150–450)
POTASSIUM SERPL-SCNC: 4.1 MMOL/L (ref 3.5–5.1)
RBC # BLD AUTO: 4.11 X10(6)UL (ref 3.8–5.3)
SODIUM SERPL-SCNC: 135 MMOL/L (ref 136–145)
TRIGL SERPL-MCNC: 90 MG/DL (ref 30–149)
TSI SER-ACNC: 1.47 MIU/ML (ref 0.36–3.74)
VLDLC SERPL CALC-MCNC: 18 MG/DL (ref 0–30)
WBC # BLD AUTO: 4.2 X10(3) UL (ref 4–11)

## 2019-07-02 PROCEDURE — 84443 ASSAY THYROID STIM HORMONE: CPT

## 2019-07-02 PROCEDURE — 80061 LIPID PANEL: CPT

## 2019-07-02 PROCEDURE — 36591 DRAW BLOOD OFF VENOUS DEVICE: CPT

## 2019-07-02 PROCEDURE — 85025 COMPLETE CBC W/AUTO DIFF WBC: CPT

## 2019-07-02 PROCEDURE — 80053 COMPREHEN METABOLIC PANEL: CPT

## 2019-07-02 RX ORDER — 0.9 % SODIUM CHLORIDE 0.9 %
10 VIAL (ML) INJECTION ONCE
Status: CANCELLED | OUTPATIENT
Start: 2019-07-02

## 2019-07-02 RX ORDER — 0.9 % SODIUM CHLORIDE 0.9 %
VIAL (ML) INJECTION
Status: DISCONTINUED
Start: 2019-07-02 | End: 2019-07-02

## 2019-07-02 RX ORDER — HEPARIN SODIUM (PORCINE) LOCK FLUSH IV SOLN 100 UNIT/ML 100 UNIT/ML
5 SOLUTION INTRAVENOUS ONCE
Status: COMPLETED | OUTPATIENT
Start: 2019-07-02 | End: 2019-07-02

## 2019-07-02 RX ORDER — HEPARIN SODIUM (PORCINE) LOCK FLUSH IV SOLN 100 UNIT/ML 100 UNIT/ML
5 SOLUTION INTRAVENOUS ONCE
Status: CANCELLED | OUTPATIENT
Start: 2019-07-02

## 2019-07-02 RX ADMIN — HEPARIN SODIUM (PORCINE) LOCK FLUSH IV SOLN 100 UNIT/ML 500 UNITS: 100 SOLUTION INTRAVENOUS at 10:46:00

## 2019-08-26 ENCOUNTER — NURSE ONLY (OUTPATIENT)
Dept: HEMATOLOGY/ONCOLOGY | Facility: HOSPITAL | Age: 64
End: 2019-08-26
Attending: INTERNAL MEDICINE
Payer: MEDICAID

## 2019-08-26 DIAGNOSIS — C50.411 MALIGNANT NEOPLASM OF UPPER-OUTER QUADRANT OF RIGHT BREAST IN FEMALE, ESTROGEN RECEPTOR NEGATIVE (HCC): Primary | ICD-10-CM

## 2019-08-26 DIAGNOSIS — Z17.1 MALIGNANT NEOPLASM OF UPPER-OUTER QUADRANT OF RIGHT BREAST IN FEMALE, ESTROGEN RECEPTOR NEGATIVE (HCC): Primary | ICD-10-CM

## 2019-08-26 PROCEDURE — 96523 IRRIG DRUG DELIVERY DEVICE: CPT

## 2019-08-26 RX ORDER — HEPARIN SODIUM (PORCINE) LOCK FLUSH IV SOLN 100 UNIT/ML 100 UNIT/ML
5 SOLUTION INTRAVENOUS ONCE
Status: COMPLETED | OUTPATIENT
Start: 2019-08-26 | End: 2019-08-26

## 2019-08-26 RX ORDER — 0.9 % SODIUM CHLORIDE 0.9 %
VIAL (ML) INJECTION
Status: DISCONTINUED
Start: 2019-08-26 | End: 2019-08-26

## 2019-08-26 RX ORDER — HEPARIN SODIUM (PORCINE) LOCK FLUSH IV SOLN 100 UNIT/ML 100 UNIT/ML
5 SOLUTION INTRAVENOUS ONCE
Status: CANCELLED | OUTPATIENT
Start: 2019-08-26

## 2019-08-26 RX ORDER — 0.9 % SODIUM CHLORIDE 0.9 %
10 VIAL (ML) INJECTION ONCE
Status: CANCELLED | OUTPATIENT
Start: 2019-08-26

## 2019-08-26 RX ADMIN — HEPARIN SODIUM (PORCINE) LOCK FLUSH IV SOLN 100 UNIT/ML 500 UNITS: 100 SOLUTION INTRAVENOUS at 10:54:00

## 2019-08-27 ENCOUNTER — APPOINTMENT (OUTPATIENT)
Dept: HEMATOLOGY/ONCOLOGY | Facility: HOSPITAL | Age: 64
End: 2019-08-27
Attending: INTERNAL MEDICINE
Payer: MEDICAID

## 2019-09-11 ENCOUNTER — OFFICE VISIT (OUTPATIENT)
Dept: HEMATOLOGY/ONCOLOGY | Facility: HOSPITAL | Age: 64
End: 2019-09-11
Attending: INTERNAL MEDICINE
Payer: MEDICAID

## 2019-09-11 VITALS
BODY MASS INDEX: 25.88 KG/M2 | HEIGHT: 66 IN | SYSTOLIC BLOOD PRESSURE: 121 MMHG | DIASTOLIC BLOOD PRESSURE: 74 MMHG | HEART RATE: 74 BPM | TEMPERATURE: 99 F | OXYGEN SATURATION: 98 % | RESPIRATION RATE: 18 BRPM | WEIGHT: 161 LBS

## 2019-09-11 DIAGNOSIS — Z79.899 ENCOUNTER FOR MONITORING CARDIOTOXIC DRUG THERAPY: ICD-10-CM

## 2019-09-11 DIAGNOSIS — Z17.1 MALIGNANT NEOPLASM OF UPPER-OUTER QUADRANT OF RIGHT BREAST IN FEMALE, ESTROGEN RECEPTOR NEGATIVE (HCC): Primary | ICD-10-CM

## 2019-09-11 DIAGNOSIS — G62.0 PERIPHERAL NEUROPATHY DUE TO CHEMOTHERAPY (HCC): ICD-10-CM

## 2019-09-11 DIAGNOSIS — Z51.81 ENCOUNTER FOR MONITORING CARDIOTOXIC DRUG THERAPY: ICD-10-CM

## 2019-09-11 DIAGNOSIS — C50.411 MALIGNANT NEOPLASM OF UPPER-OUTER QUADRANT OF RIGHT BREAST IN FEMALE, ESTROGEN RECEPTOR NEGATIVE (HCC): Primary | ICD-10-CM

## 2019-09-11 DIAGNOSIS — Z12.31 SCREENING MAMMOGRAM, ENCOUNTER FOR: ICD-10-CM

## 2019-09-11 DIAGNOSIS — T45.1X5A PERIPHERAL NEUROPATHY DUE TO CHEMOTHERAPY (HCC): ICD-10-CM

## 2019-09-11 PROCEDURE — 99214 OFFICE O/P EST MOD 30 MIN: CPT | Performed by: INTERNAL MEDICINE

## 2019-09-11 NOTE — PROGRESS NOTES
DAR     Deatrice Lanes is a 60year old female who is here today for follow up of Malignant neoplasm of upper-outer quadrant of right breast in female, estrogen receptor negative (hcc)  (primary encounter diagnosis)  Peripheral neuropathy due to ch GABAPENTIN 300 MG Oral Cap TAKE 1 CAPSULE BY MOUTH THREE TIMES DAILY Disp: 90 capsule Rfl: 11   Rosuvastatin Calcium 20 MG Oral Tab Take 1 tablet (20 mg total) by mouth nightly.  Disp: 90 tablet Rfl: 3   losartan 100 MG Oral Tab Take 1 tablet (100 mg total) Smoking status: Current Some Day Smoker        Packs/day: 0.50        Years: 30.00        Pack years: 15        Types: Cigarettes      Smokeless tobacco: Never Used      Tobacco comment: plan to quit    Alcohol use: No    Drug use: No      Family Histo Lymphadenopathy:     She has no cervical adenopathy. She has no axillary adenopathy. Right: No supraclavicular adenopathy present. Left: No supraclavicular adenopathy present.    Neurological: She is alert and oriented to person, place, an Results From Past 48 Hours:  No results found for this or any previous visit (from the past 48 hour(s)). Imaging & Referrals:  [facility] JETT 2D+3D SCREENING BILAT (CPT=77067/57163)   No orders of the defined types were placed in this encounter.

## 2019-09-27 ENCOUNTER — HOSPITAL ENCOUNTER (OUTPATIENT)
Dept: CV DIAGNOSTICS | Facility: HOSPITAL | Age: 64
Discharge: HOME | End: 2019-09-27
Attending: INTERNAL MEDICINE
Payer: MEDICAID

## 2019-09-27 DIAGNOSIS — Z51.81 ENCOUNTER FOR MONITORING CARDIOTOXIC DRUG THERAPY: ICD-10-CM

## 2019-09-27 DIAGNOSIS — Z79.899 ENCOUNTER FOR MONITORING CARDIOTOXIC DRUG THERAPY: ICD-10-CM

## 2019-09-27 PROCEDURE — 93306 TTE W/DOPPLER COMPLETE: CPT | Performed by: INTERNAL MEDICINE

## 2019-10-21 ENCOUNTER — NURSE ONLY (OUTPATIENT)
Dept: HEMATOLOGY/ONCOLOGY | Facility: HOSPITAL | Age: 64
End: 2019-10-21
Attending: INTERNAL MEDICINE
Payer: MEDICAID

## 2019-10-21 DIAGNOSIS — C50.411 MALIGNANT NEOPLASM OF UPPER-OUTER QUADRANT OF RIGHT BREAST IN FEMALE, ESTROGEN RECEPTOR NEGATIVE (HCC): Primary | ICD-10-CM

## 2019-10-21 DIAGNOSIS — Z17.1 MALIGNANT NEOPLASM OF UPPER-OUTER QUADRANT OF RIGHT BREAST IN FEMALE, ESTROGEN RECEPTOR NEGATIVE (HCC): Primary | ICD-10-CM

## 2019-10-21 PROCEDURE — 96523 IRRIG DRUG DELIVERY DEVICE: CPT

## 2019-10-21 RX ORDER — 0.9 % SODIUM CHLORIDE 0.9 %
10 VIAL (ML) INJECTION ONCE
Status: CANCELLED | OUTPATIENT
Start: 2019-10-21

## 2019-10-21 RX ORDER — HEPARIN SODIUM (PORCINE) LOCK FLUSH IV SOLN 100 UNIT/ML 100 UNIT/ML
5 SOLUTION INTRAVENOUS ONCE
Status: COMPLETED | OUTPATIENT
Start: 2019-10-21 | End: 2019-10-21

## 2019-10-21 RX ORDER — HEPARIN SODIUM (PORCINE) LOCK FLUSH IV SOLN 100 UNIT/ML 100 UNIT/ML
5 SOLUTION INTRAVENOUS ONCE
Status: CANCELLED | OUTPATIENT
Start: 2019-10-21

## 2019-10-21 RX ORDER — 0.9 % SODIUM CHLORIDE 0.9 %
VIAL (ML) INJECTION
Status: DISCONTINUED
Start: 2019-10-21 | End: 2019-10-21

## 2019-10-21 RX ADMIN — HEPARIN SODIUM (PORCINE) LOCK FLUSH IV SOLN 100 UNIT/ML 500 UNITS: 100 SOLUTION INTRAVENOUS at 10:27:00

## 2019-10-29 RX ORDER — LEVOTHYROXINE SODIUM 0.1 MG/1
TABLET ORAL
Qty: 30 TABLET | Refills: 11 | Status: SHIPPED | OUTPATIENT
Start: 2019-10-29 | End: 2020-12-02

## 2019-12-13 PROBLEM — Z45.2 ENCOUNTER FOR CARE RELATED TO PORT-A-CATH: Status: ACTIVE | Noted: 2019-12-13

## 2019-12-13 RX ORDER — 0.9 % SODIUM CHLORIDE 0.9 %
10 VIAL (ML) INJECTION ONCE
Status: CANCELLED | OUTPATIENT
Start: 2019-12-13

## 2019-12-13 RX ORDER — HEPARIN SODIUM (PORCINE) LOCK FLUSH IV SOLN 100 UNIT/ML 100 UNIT/ML
5 SOLUTION INTRAVENOUS ONCE
Status: CANCELLED | OUTPATIENT
Start: 2019-12-13

## 2019-12-16 ENCOUNTER — NURSE ONLY (OUTPATIENT)
Dept: HEMATOLOGY/ONCOLOGY | Facility: HOSPITAL | Age: 64
End: 2019-12-16
Attending: INTERNAL MEDICINE
Payer: MEDICAID

## 2019-12-16 DIAGNOSIS — Z45.2 ENCOUNTER FOR CARE RELATED TO PORT-A-CATH: Primary | ICD-10-CM

## 2019-12-16 PROCEDURE — 96523 IRRIG DRUG DELIVERY DEVICE: CPT

## 2019-12-16 RX ORDER — 0.9 % SODIUM CHLORIDE 0.9 %
10 VIAL (ML) INJECTION ONCE
Status: CANCELLED | OUTPATIENT
Start: 2019-12-16

## 2019-12-16 RX ORDER — 0.9 % SODIUM CHLORIDE 0.9 %
VIAL (ML) INJECTION
Status: DISCONTINUED
Start: 2019-12-16 | End: 2019-12-16

## 2019-12-16 RX ORDER — HEPARIN SODIUM (PORCINE) LOCK FLUSH IV SOLN 100 UNIT/ML 100 UNIT/ML
5 SOLUTION INTRAVENOUS ONCE
Status: COMPLETED | OUTPATIENT
Start: 2019-12-16 | End: 2019-12-16

## 2019-12-16 RX ORDER — HEPARIN SODIUM (PORCINE) LOCK FLUSH IV SOLN 100 UNIT/ML 100 UNIT/ML
5 SOLUTION INTRAVENOUS ONCE
Status: CANCELLED | OUTPATIENT
Start: 2019-12-16

## 2019-12-16 RX ADMIN — HEPARIN SODIUM (PORCINE) LOCK FLUSH IV SOLN 100 UNIT/ML 500 UNITS: 100 SOLUTION INTRAVENOUS at 10:40:00

## 2020-02-03 ENCOUNTER — TELEPHONE (OUTPATIENT)
Dept: SLEEP MEDICINE | Age: 65
End: 2020-02-03

## 2020-02-10 ENCOUNTER — HOSPITAL ENCOUNTER (OUTPATIENT)
Dept: CV DIAGNOSTICS | Facility: HOSPITAL | Age: 65
Discharge: HOME | End: 2020-02-10
Attending: INTERNAL MEDICINE
Payer: MEDICAID

## 2020-02-10 ENCOUNTER — NURSE ONLY (OUTPATIENT)
Dept: HEMATOLOGY/ONCOLOGY | Facility: HOSPITAL | Age: 65
End: 2020-02-10
Attending: INTERNAL MEDICINE
Payer: MEDICAID

## 2020-02-10 DIAGNOSIS — Z45.2 ENCOUNTER FOR CARE RELATED TO PORT-A-CATH: Primary | ICD-10-CM

## 2020-02-10 DIAGNOSIS — C50.012 MALIGNANT NEOPLASM OF NIPPLE OF LEFT BREAST IN FEMALE, UNSPECIFIED ESTROGEN RECEPTOR STATUS (HCC): ICD-10-CM

## 2020-02-10 PROCEDURE — 36591 DRAW BLOOD OFF VENOUS DEVICE: CPT

## 2020-02-10 PROCEDURE — 93306 TTE W/DOPPLER COMPLETE: CPT | Performed by: INTERNAL MEDICINE

## 2020-02-10 RX ORDER — 0.9 % SODIUM CHLORIDE 0.9 %
10 VIAL (ML) INJECTION ONCE
Status: CANCELLED | OUTPATIENT
Start: 2020-02-10

## 2020-02-10 RX ORDER — HEPARIN SODIUM (PORCINE) LOCK FLUSH IV SOLN 100 UNIT/ML 100 UNIT/ML
5 SOLUTION INTRAVENOUS ONCE
Status: COMPLETED | OUTPATIENT
Start: 2020-02-10 | End: 2020-02-10

## 2020-02-10 RX ORDER — 0.9 % SODIUM CHLORIDE 0.9 %
10 VIAL (ML) INJECTION ONCE
Status: DISCONTINUED | OUTPATIENT
Start: 2020-02-10 | End: 2020-02-10

## 2020-02-10 RX ORDER — 0.9 % SODIUM CHLORIDE 0.9 %
VIAL (ML) INJECTION
Status: DISCONTINUED
Start: 2020-02-10 | End: 2020-02-10

## 2020-02-10 RX ORDER — HEPARIN SODIUM (PORCINE) LOCK FLUSH IV SOLN 100 UNIT/ML 100 UNIT/ML
5 SOLUTION INTRAVENOUS ONCE
Status: CANCELLED | OUTPATIENT
Start: 2020-02-10

## 2020-02-10 RX ADMIN — HEPARIN SODIUM (PORCINE) LOCK FLUSH IV SOLN 100 UNIT/ML 500 UNITS: 100 SOLUTION INTRAVENOUS at 10:30:00

## 2020-02-14 RX ORDER — ROSUVASTATIN CALCIUM 20 MG/1
20 TABLET, COATED ORAL NIGHTLY
Qty: 90 TABLET | Refills: 3 | Status: SHIPPED | OUTPATIENT
Start: 2020-02-14 | End: 2021-01-08

## 2020-02-14 RX ORDER — LOSARTAN POTASSIUM 50 MG/1
50 TABLET ORAL DAILY
Qty: 90 TABLET | Refills: 3 | Status: SHIPPED | OUTPATIENT
Start: 2020-02-14 | End: 2020-03-17

## 2020-03-09 ENCOUNTER — TELEPHONE (OUTPATIENT)
Dept: HEMATOLOGY/ONCOLOGY | Facility: HOSPITAL | Age: 65
End: 2020-03-09

## 2020-03-09 ENCOUNTER — APPOINTMENT (OUTPATIENT)
Dept: HEMATOLOGY/ONCOLOGY | Facility: HOSPITAL | Age: 65
End: 2020-03-09
Attending: INTERNAL MEDICINE
Payer: MEDICAID

## 2020-03-09 NOTE — TELEPHONE ENCOUNTER
Mammogram is on 3/27. She has been rescheduled for 2 months after. Please schedule for early April.      Thanks,    [employer]

## 2020-03-09 NOTE — TELEPHONE ENCOUNTER
Misael Mobley at [phone] have a cold and cough, runny nose, she is cancelling her appointment with Dr. Mayra Austin and rescheduling.

## 2020-03-09 NOTE — TELEPHONE ENCOUNTER
I returned Nicole's call. She has a cold. I updated her that she has a mammogram scheduled for 3/27/2020. Dr Dirk Fernandes would like to see her in early April. I transferred her to Hafsa Grajeda to reschedule.

## 2020-03-09 NOTE — TELEPHONE ENCOUNTER
Spoke with Evelyn Mojica nurse regarding an appointment in April. She will get back to us regarding this.

## 2020-03-18 ENCOUNTER — TELEPHONE (OUTPATIENT)
Dept: INTERNAL MEDICINE CLINIC | Facility: CLINIC | Age: 65
End: 2020-03-18

## 2020-03-18 NOTE — TELEPHONE ENCOUNTER
RE:  losartan Potassium 50 MG Oral Tab 180 tablet 3       Dosage was 1 x a day but now reads 2xday    Please clarify    [address], [address] [place] [address] [phone], [phone]

## 2020-03-20 ENCOUNTER — TELEPHONE (OUTPATIENT)
Dept: INTERNAL MEDICINE CLINIC | Facility: CLINIC | Age: 65
End: 2020-03-20

## 2020-03-20 NOTE — TELEPHONE ENCOUNTER
Received a PA request for     Budesonide-Formoterol Fumarate (SYMBICORT) 80-4.5 MCG/ACT Inhalation Aerosol 1 Inhaler 5 3/17/2020    Sig:   Inhale 2 puffs into the lungs 2 (two) times daily.      Route:   Inhalation     Order #: [id number]       Sent in the

## 2020-03-23 PROBLEM — J44.1 CHRONIC OBSTRUCTIVE PULMONARY DISEASE WITH ACUTE EXACERBATION (HCC): Status: ACTIVE | Noted: 2020-03-23

## 2020-03-24 NOTE — TELEPHONE ENCOUNTER
Process Information  Process  ePA Case   Status  Complete   PA Information  Case Id  N/A   Reference Id  [id number] && 0\"Notes && 0\" View   Task History  0\"   Task Status Recipient(s) User Date/Time   Initiate Prior [employer]

## 2020-03-24 NOTE — TELEPHONE ENCOUNTER
Insurance had denied medication. Patient has to have tried and failed these drugs     Dulera aerosol   Symbicort 160/80 package size 10.2 grams  wixela inhub 250/50    Dr Nayan Koroma informed PA faxed to Dr Nayan Koroma as well. ..

## 2020-04-06 ENCOUNTER — APPOINTMENT (OUTPATIENT)
Dept: HEMATOLOGY/ONCOLOGY | Facility: HOSPITAL | Age: 65
End: 2020-04-06
Attending: INTERNAL MEDICINE
Payer: MEDICAID

## 2020-04-08 ENCOUNTER — APPOINTMENT (OUTPATIENT)
Dept: HEMATOLOGY/ONCOLOGY | Facility: HOSPITAL | Age: 65
End: 2020-04-08
Attending: INTERNAL MEDICINE
Payer: MEDICAID

## 2020-04-08 DIAGNOSIS — Z17.1 MALIGNANT NEOPLASM OF UPPER-OUTER QUADRANT OF RIGHT BREAST IN FEMALE, ESTROGEN RECEPTOR NEGATIVE (HCC): Primary | ICD-10-CM

## 2020-04-08 DIAGNOSIS — C50.411 MALIGNANT NEOPLASM OF UPPER-OUTER QUADRANT OF RIGHT BREAST IN FEMALE, ESTROGEN RECEPTOR NEGATIVE (HCC): Primary | ICD-10-CM

## 2020-04-08 DIAGNOSIS — T45.1X5A PERIPHERAL NEUROPATHY DUE TO CHEMOTHERAPY (HCC): ICD-10-CM

## 2020-04-08 DIAGNOSIS — Z51.81 ENCOUNTER FOR MONITORING CARDIOTOXIC DRUG THERAPY: ICD-10-CM

## 2020-04-08 DIAGNOSIS — G62.0 PERIPHERAL NEUROPATHY DUE TO CHEMOTHERAPY (HCC): ICD-10-CM

## 2020-04-08 DIAGNOSIS — Z85.3 ENCOUNTER FOR FOLLOW-UP SURVEILLANCE OF BREAST CANCER: ICD-10-CM

## 2020-04-08 DIAGNOSIS — Z08 ENCOUNTER FOR FOLLOW-UP SURVEILLANCE OF BREAST CANCER: ICD-10-CM

## 2020-04-08 DIAGNOSIS — Z85.3 HISTORY OF RIGHT BREAST CANCER: ICD-10-CM

## 2020-04-08 DIAGNOSIS — Z51.81 ENCOUNTER FOR MEDICATION MONITORING: ICD-10-CM

## 2020-04-08 DIAGNOSIS — Z79.899 ENCOUNTER FOR MONITORING CARDIOTOXIC DRUG THERAPY: ICD-10-CM

## 2020-04-08 PROCEDURE — 99214 OFFICE O/P EST MOD 30 MIN: CPT | Performed by: INTERNAL MEDICINE

## 2020-04-08 NOTE — PROGRESS NOTES
Virtual/Telephone Check-In    [address] [place] [address]  verbally consents to a Virtual/Telephone Check-In service on 04/08/20.   Patient understands and accepts financial responsibility for any deductible, co-insurance and/or co-pays associated with this service exertion  Gastrointestinal ROS: no abdominal pain, change in bowel habits, or black or bloody stools  Genito-Urinary ROS: no dysuria, trouble voiding, or hematuria  Musculoskeletal ROS: states no aches or pains, only sore after exercising.   Neurological RO Take by mouth daily. , Disp: , Rfl:   •  LUTEIN-ZEAXANTHIN OR, Take by mouth daily.   , Disp: , Rfl:           HISTORY REVIEWED WITH PATIENT:  Past Medical History:   Diagnosis Date   • Breast cancer ([facility] [address]     right   • Cataract     Left eye   • Disorder o Stage IIA (T1c(3), N1, M0) - Signed by Raudel Briceno MD on 10/5/2016      Pathologic stage from 3/22/2017: yT0, N0, cM0 - Signed by Raudel Briceno MD on 3/22/2017      Patient achieved a pCR after neoadjuvant chemotherapy with s/p cycle 6 TCHP and a yea -------------------------------------------------------------------  Study Conclusions  1. Left ventricle: The cavity size was normal. Wall thickness was     normal. Systolic function was normal. The estimated ejection     fraction was 60%.  Wall motion w

## 2020-04-10 ENCOUNTER — NURSE ONLY (OUTPATIENT)
Dept: HEMATOLOGY/ONCOLOGY | Facility: HOSPITAL | Age: 65
End: 2020-04-10
Attending: INTERNAL MEDICINE
Payer: MEDICAID

## 2020-04-10 DIAGNOSIS — Z45.2 ENCOUNTER FOR CARE RELATED TO PORT-A-CATH: Primary | ICD-10-CM

## 2020-04-10 PROCEDURE — 96523 IRRIG DRUG DELIVERY DEVICE: CPT

## 2020-04-10 RX ORDER — HEPARIN SODIUM (PORCINE) LOCK FLUSH IV SOLN 100 UNIT/ML 100 UNIT/ML
5 SOLUTION INTRAVENOUS ONCE
Status: CANCELLED | OUTPATIENT
Start: 2020-04-10

## 2020-04-10 RX ORDER — HEPARIN SODIUM (PORCINE) LOCK FLUSH IV SOLN 100 UNIT/ML 100 UNIT/ML
5 SOLUTION INTRAVENOUS ONCE
Status: COMPLETED | OUTPATIENT
Start: 2020-04-10 | End: 2020-04-10

## 2020-04-10 RX ORDER — 0.9 % SODIUM CHLORIDE 0.9 %
10 VIAL (ML) INJECTION ONCE
Status: CANCELLED | OUTPATIENT
Start: 2020-04-10

## 2020-04-10 RX ORDER — 0.9 % SODIUM CHLORIDE 0.9 %
VIAL (ML) INJECTION
Status: DISCONTINUED
Start: 2020-04-10 | End: 2020-04-10

## 2020-04-10 RX ADMIN — HEPARIN SODIUM (PORCINE) LOCK FLUSH IV SOLN 100 UNIT/ML 500 UNITS: 100 SOLUTION INTRAVENOUS at 09:51:00

## 2020-05-07 ENCOUNTER — HOSPITAL ENCOUNTER (OUTPATIENT)
Dept: MAMMOGRAPHY | Facility: HOSPITAL | Age: 65
Discharge: HOME | End: 2020-05-07
Attending: INTERNAL MEDICINE
Payer: MEDICAID

## 2020-05-07 DIAGNOSIS — Z12.31 SCREENING MAMMOGRAM, ENCOUNTER FOR: ICD-10-CM

## 2020-05-07 PROCEDURE — 77063 BREAST TOMOSYNTHESIS BI: CPT | Performed by: INTERNAL MEDICINE

## 2020-05-07 PROCEDURE — 77067 SCR MAMMO BI INCL CAD: CPT | Performed by: INTERNAL MEDICINE

## 2020-05-13 ENCOUNTER — NURSE ONLY (OUTPATIENT)
Dept: HEMATOLOGY/ONCOLOGY | Facility: HOSPITAL | Age: 65
End: 2020-05-13
Attending: INTERNAL MEDICINE
Payer: MEDICAID

## 2020-05-13 DIAGNOSIS — Z17.1 MALIGNANT NEOPLASM OF AREOLA OF RIGHT BREAST IN FEMALE, ESTROGEN RECEPTOR NEGATIVE (HCC): ICD-10-CM

## 2020-05-13 DIAGNOSIS — E03.8 OTHER SPECIFIED HYPOTHYROIDISM: Primary | ICD-10-CM

## 2020-05-13 DIAGNOSIS — Z45.2 ENCOUNTER FOR CARE RELATED TO PORT-A-CATH: ICD-10-CM

## 2020-05-13 DIAGNOSIS — C50.011 MALIGNANT NEOPLASM OF AREOLA OF RIGHT BREAST IN FEMALE, ESTROGEN RECEPTOR NEGATIVE (HCC): ICD-10-CM

## 2020-05-13 DIAGNOSIS — E78.2 MIXED HYPERLIPIDEMIA: ICD-10-CM

## 2020-05-13 PROCEDURE — 84481 FREE ASSAY (FT-3): CPT

## 2020-05-13 PROCEDURE — 84443 ASSAY THYROID STIM HORMONE: CPT

## 2020-05-13 PROCEDURE — 80053 COMPREHEN METABOLIC PANEL: CPT

## 2020-05-13 PROCEDURE — 84439 ASSAY OF FREE THYROXINE: CPT

## 2020-05-13 PROCEDURE — 85025 COMPLETE CBC W/AUTO DIFF WBC: CPT

## 2020-05-13 PROCEDURE — 80061 LIPID PANEL: CPT

## 2020-05-13 PROCEDURE — 36591 DRAW BLOOD OFF VENOUS DEVICE: CPT

## 2020-05-13 RX ORDER — 0.9 % SODIUM CHLORIDE 0.9 %
10 VIAL (ML) INJECTION ONCE
Status: DISCONTINUED | OUTPATIENT
Start: 2020-05-13 | End: 2020-05-13

## 2020-05-13 RX ORDER — 0.9 % SODIUM CHLORIDE 0.9 %
10 VIAL (ML) INJECTION ONCE
Status: CANCELLED | OUTPATIENT
Start: 2020-05-13

## 2020-05-13 RX ORDER — HEPARIN SODIUM (PORCINE) LOCK FLUSH IV SOLN 100 UNIT/ML 100 UNIT/ML
5 SOLUTION INTRAVENOUS ONCE
Status: CANCELLED | OUTPATIENT
Start: 2020-05-13

## 2020-05-13 RX ORDER — 0.9 % SODIUM CHLORIDE 0.9 %
VIAL (ML) INJECTION
Status: DISCONTINUED
Start: 2020-05-13 | End: 2020-05-13

## 2020-05-13 RX ORDER — HEPARIN SODIUM (PORCINE) LOCK FLUSH IV SOLN 100 UNIT/ML 100 UNIT/ML
5 SOLUTION INTRAVENOUS ONCE
Status: COMPLETED | OUTPATIENT
Start: 2020-05-13 | End: 2020-05-13

## 2020-05-13 RX ADMIN — HEPARIN SODIUM (PORCINE) LOCK FLUSH IV SOLN 100 UNIT/ML 500 UNITS: 100 SOLUTION INTRAVENOUS at 10:44:00

## 2020-05-20 ENCOUNTER — APPOINTMENT (OUTPATIENT)
Dept: HEMATOLOGY/ONCOLOGY | Facility: HOSPITAL | Age: 65
End: 2020-05-20
Attending: INTERNAL MEDICINE
Payer: MEDICAID

## 2020-06-05 ENCOUNTER — APPOINTMENT (OUTPATIENT)
Dept: HEMATOLOGY/ONCOLOGY | Facility: HOSPITAL | Age: 65
End: 2020-06-05
Attending: INTERNAL MEDICINE
Payer: MEDICAID

## 2020-06-06 ENCOUNTER — OFFICE VISIT (OUTPATIENT)
Dept: INTERNAL MEDICINE CLINIC | Facility: CLINIC | Age: 65
End: 2020-06-06
Payer: MEDICAID

## 2020-06-06 VITALS
HEIGHT: 66 IN | WEIGHT: 159.38 LBS | HEART RATE: 87 BPM | SYSTOLIC BLOOD PRESSURE: 122 MMHG | DIASTOLIC BLOOD PRESSURE: 80 MMHG | OXYGEN SATURATION: 98 % | BODY MASS INDEX: 25.61 KG/M2

## 2020-06-06 DIAGNOSIS — E03.8 OTHER SPECIFIED HYPOTHYROIDISM: ICD-10-CM

## 2020-06-06 DIAGNOSIS — E78.2 MIXED HYPERLIPIDEMIA: ICD-10-CM

## 2020-06-06 DIAGNOSIS — Z17.0 MALIGNANT NEOPLASM OF AREOLA OF RIGHT BREAST IN FEMALE, ESTROGEN RECEPTOR POSITIVE (HCC): Primary | ICD-10-CM

## 2020-06-06 DIAGNOSIS — C50.011 MALIGNANT NEOPLASM OF AREOLA OF RIGHT BREAST IN FEMALE, ESTROGEN RECEPTOR POSITIVE (HCC): Primary | ICD-10-CM

## 2020-06-06 DIAGNOSIS — Z12.11 COLON CANCER SCREENING: ICD-10-CM

## 2020-06-06 PROCEDURE — 99214 OFFICE O/P EST MOD 30 MIN: CPT | Performed by: INTERNAL MEDICINE

## 2020-06-06 NOTE — PROGRESS NOTES
HPI:    Patient ID: Noelle Curiel is a 60year old female. HPIPatient here for a check up and fu on breast exam - is 2 years out for breast cancer. Also fu on [employer]. Review of Systems   Constitutional: Negative for fatigue.    Respiratory: (Patient not taking: Reported on 6/6/2020 ) 40 tablet 0   • mometasone 0.1 % External Ointment Apply 1 Application topically daily. (Patient not taking: Reported on 6/6/2020 ) 1 Tube 1   • LUTEIN-ZEAXANTHIN OR Take by mouth daily.          Allergies:  Penic

## 2020-07-14 ENCOUNTER — NURSE ONLY (OUTPATIENT)
Dept: HEMATOLOGY/ONCOLOGY | Facility: HOSPITAL | Age: 65
End: 2020-07-14
Attending: INTERNAL MEDICINE
Payer: MEDICAID

## 2020-07-14 DIAGNOSIS — Z45.2 ENCOUNTER FOR CARE RELATED TO PORT-A-CATH: Primary | ICD-10-CM

## 2020-07-14 PROCEDURE — 96523 IRRIG DRUG DELIVERY DEVICE: CPT

## 2020-07-14 RX ORDER — HEPARIN SODIUM (PORCINE) LOCK FLUSH IV SOLN 100 UNIT/ML 100 UNIT/ML
5 SOLUTION INTRAVENOUS ONCE
Status: COMPLETED | OUTPATIENT
Start: 2020-07-14 | End: 2020-07-14

## 2020-07-14 RX ORDER — 0.9 % SODIUM CHLORIDE 0.9 %
10 VIAL (ML) INJECTION ONCE
Status: CANCELLED | OUTPATIENT
Start: 2020-07-14

## 2020-07-14 RX ORDER — HEPARIN SODIUM (PORCINE) LOCK FLUSH IV SOLN 100 UNIT/ML 100 UNIT/ML
5 SOLUTION INTRAVENOUS ONCE
Status: CANCELLED | OUTPATIENT
Start: 2020-07-14

## 2020-07-14 RX ORDER — 0.9 % SODIUM CHLORIDE 0.9 %
VIAL (ML) INJECTION
Status: DISCONTINUED
Start: 2020-07-14 | End: 2020-07-14

## 2020-07-14 RX ADMIN — HEPARIN SODIUM (PORCINE) LOCK FLUSH IV SOLN 100 UNIT/ML 500 UNITS: 100 SOLUTION INTRAVENOUS at 13:06:00

## 2020-08-25 ENCOUNTER — NURSE ONLY (OUTPATIENT)
Dept: HEMATOLOGY/ONCOLOGY | Facility: HOSPITAL | Age: 65
End: 2020-08-25
Attending: INTERNAL MEDICINE
Payer: MEDICAID

## 2020-08-25 DIAGNOSIS — Z45.2 ENCOUNTER FOR CARE RELATED TO PORT-A-CATH: Primary | ICD-10-CM

## 2020-08-25 PROCEDURE — 96523 IRRIG DRUG DELIVERY DEVICE: CPT

## 2020-08-25 RX ORDER — HEPARIN SODIUM (PORCINE) LOCK FLUSH IV SOLN 100 UNIT/ML 100 UNIT/ML
5 SOLUTION INTRAVENOUS ONCE
Status: COMPLETED | OUTPATIENT
Start: 2020-08-25 | End: 2020-08-25

## 2020-08-25 RX ORDER — 0.9 % SODIUM CHLORIDE 0.9 %
10 VIAL (ML) INJECTION ONCE
Status: CANCELLED | OUTPATIENT
Start: 2020-08-25

## 2020-08-25 RX ORDER — 0.9 % SODIUM CHLORIDE 0.9 %
10 VIAL (ML) INJECTION ONCE
Status: DISCONTINUED | OUTPATIENT
Start: 2020-08-25 | End: 2020-08-25

## 2020-08-25 RX ORDER — HEPARIN SODIUM (PORCINE) LOCK FLUSH IV SOLN 100 UNIT/ML 100 UNIT/ML
5 SOLUTION INTRAVENOUS ONCE
Status: CANCELLED | OUTPATIENT
Start: 2020-08-25

## 2020-08-25 RX ADMIN — HEPARIN SODIUM (PORCINE) LOCK FLUSH IV SOLN 100 UNIT/ML 500 UNITS: 100 SOLUTION INTRAVENOUS at 11:32:00

## 2020-08-27 ENCOUNTER — HOSPITAL ENCOUNTER (OUTPATIENT)
Dept: CV DIAGNOSTICS | Facility: HOSPITAL | Age: 65
Discharge: HOME | End: 2020-08-27
Attending: INTERNAL MEDICINE
Payer: MEDICAID

## 2020-08-27 DIAGNOSIS — Z51.81 ENCOUNTER FOR MONITORING CARDIOTOXIC DRUG THERAPY: ICD-10-CM

## 2020-08-27 DIAGNOSIS — Z79.899 ENCOUNTER FOR MONITORING CARDIOTOXIC DRUG THERAPY: ICD-10-CM

## 2020-08-27 PROCEDURE — 93306 TTE W/DOPPLER COMPLETE: CPT | Performed by: INTERNAL MEDICINE

## 2020-10-15 ENCOUNTER — NURSE ONLY (OUTPATIENT)
Dept: HEMATOLOGY/ONCOLOGY | Facility: HOSPITAL | Age: 65
End: 2020-10-15
Attending: INTERNAL MEDICINE
Payer: MEDICAID

## 2020-10-15 ENCOUNTER — IMMUNIZATION (OUTPATIENT)
Dept: FAMILY MEDICINE CLINIC | Facility: CLINIC | Age: 65
End: 2020-10-15
Payer: MEDICAID

## 2020-10-15 VITALS
HEART RATE: 57 BPM | DIASTOLIC BLOOD PRESSURE: 95 MMHG | WEIGHT: 155 LBS | OXYGEN SATURATION: 99 % | RESPIRATION RATE: 18 BRPM | SYSTOLIC BLOOD PRESSURE: 174 MMHG | HEIGHT: 66 IN | TEMPERATURE: 97 F | BODY MASS INDEX: 24.91 KG/M2

## 2020-10-15 DIAGNOSIS — Z08 ENCOUNTER FOR FOLLOW-UP SURVEILLANCE OF BREAST CANCER: ICD-10-CM

## 2020-10-15 DIAGNOSIS — Z51.81 ENCOUNTER FOR MEDICATION MONITORING: ICD-10-CM

## 2020-10-15 DIAGNOSIS — Z85.3 HISTORY OF RIGHT BREAST CANCER: Primary | ICD-10-CM

## 2020-10-15 DIAGNOSIS — G62.0 PERIPHERAL NEUROPATHY DUE TO CHEMOTHERAPY (HCC): ICD-10-CM

## 2020-10-15 DIAGNOSIS — Z45.2 ENCOUNTER FOR CARE RELATED TO PORT-A-CATH: Primary | ICD-10-CM

## 2020-10-15 DIAGNOSIS — Z51.81 ENCOUNTER FOR MONITORING CARDIOTOXIC DRUG THERAPY: ICD-10-CM

## 2020-10-15 DIAGNOSIS — T45.1X5A PERIPHERAL NEUROPATHY DUE TO CHEMOTHERAPY (HCC): ICD-10-CM

## 2020-10-15 DIAGNOSIS — Z79.899 ENCOUNTER FOR MONITORING CARDIOTOXIC DRUG THERAPY: ICD-10-CM

## 2020-10-15 DIAGNOSIS — Z12.31 SCREENING MAMMOGRAM, ENCOUNTER FOR: ICD-10-CM

## 2020-10-15 DIAGNOSIS — Z95.828 PORT-A-CATH IN PLACE: ICD-10-CM

## 2020-10-15 DIAGNOSIS — Z85.3 ENCOUNTER FOR FOLLOW-UP SURVEILLANCE OF BREAST CANCER: ICD-10-CM

## 2020-10-15 PROCEDURE — 90471 IMMUNIZATION ADMIN: CPT | Performed by: NURSE PRACTITIONER

## 2020-10-15 PROCEDURE — 96523 IRRIG DRUG DELIVERY DEVICE: CPT

## 2020-10-15 PROCEDURE — 99214 OFFICE O/P EST MOD 30 MIN: CPT | Performed by: INTERNAL MEDICINE

## 2020-10-15 PROCEDURE — 90686 IIV4 VACC NO PRSV 0.5 ML IM: CPT | Performed by: NURSE PRACTITIONER

## 2020-10-15 RX ORDER — HEPARIN SODIUM (PORCINE) LOCK FLUSH IV SOLN 100 UNIT/ML 100 UNIT/ML
5 SOLUTION INTRAVENOUS ONCE
Status: CANCELLED | OUTPATIENT
Start: 2020-10-15

## 2020-10-15 RX ORDER — 0.9 % SODIUM CHLORIDE 0.9 %
10 VIAL (ML) INJECTION ONCE
Status: CANCELLED | OUTPATIENT
Start: 2020-10-15

## 2020-10-15 RX ORDER — HEPARIN SODIUM (PORCINE) LOCK FLUSH IV SOLN 100 UNIT/ML 100 UNIT/ML
5 SOLUTION INTRAVENOUS ONCE
Status: COMPLETED | OUTPATIENT
Start: 2020-10-15 | End: 2020-10-15

## 2020-10-15 RX ORDER — 0.9 % SODIUM CHLORIDE 0.9 %
VIAL (ML) INJECTION
Status: DISCONTINUED
Start: 2020-10-15 | End: 2020-10-15

## 2020-10-15 RX ADMIN — HEPARIN SODIUM (PORCINE) LOCK FLUSH IV SOLN 100 UNIT/ML 500 UNITS: 100 SOLUTION INTRAVENOUS at 12:38:00

## 2020-10-15 NOTE — PROGRESS NOTES
HPI:  Malorie Becerril is a 63year old female with diagnosis of History of right breast cancer  (primary encounter diagnosis)  Encounter for follow-up surveillance of breast cancer  Peripheral neuropathy due to chemotherapy (hcc)  Encounter for [facility]  (90 Base) MCG/ACT Inhalation Aero Soln, Inhale 2 puffs into the lungs every 6 (six) hours as needed for Wheezing., Disp: 1 Inhaler, Rfl: 3  •  losartan Potassium 50 MG Oral Tab, Take 1 tablet (50 mg total) by mouth 2 (two) times daily. , Disp: 180 t Laterality Date   • CHEMOTHERAPY     • DILATION/CURETTAGE,DIAGNOSTIC     • LUMPECTOMY RIGHT  2017   • OTHER      excision of lipoma at L arm and lower back   • PARTIAL MASTECTOMY W/ SENTINEL LYMPH NODE BIOPSY Right 3/13/2017    Performed by Ava Gutiérrez diagnosis)  Encounter for follow-up surveillance of breast cancer  Peripheral neuropathy due to chemotherapy (hcc)  Encounter for medication monitoring  Encounter for monitoring cardiotoxic drug therapy  Screening mammogram, encounter for    Breast cancer Hermes Louis MD  Sonographer: Mickey Chen     CC: Vahid Simmons CC: Vahid Simmons     -------------------------------------------------------------------  Study Conclusions  1. Left ventricle:  The cavity size was normal. Wall thickness was changes in the upper-outer portion of the right breast with there are clips and some postsurgical and or radiation distortion stable. Benign type calcifications bilaterally.   Thickening of the anterior skin   of the right breast stable more likely post tr

## 2020-10-19 RX ORDER — CIPROFLOXACIN 250 MG/1
250 TABLET, FILM COATED ORAL 2 TIMES DAILY
Qty: 20 TABLET | Refills: 0 | Status: SHIPPED | OUTPATIENT
Start: 2020-10-19 | End: 2020-10-29

## 2020-11-13 RX ORDER — LEVOTHYROXINE SODIUM 0.1 MG/1
100 TABLET ORAL DAILY
Qty: 90 TABLET | Refills: 3 | Status: SHIPPED | OUTPATIENT
Start: 2020-11-13 | End: 2021-11-08

## 2020-12-01 ENCOUNTER — APPOINTMENT (OUTPATIENT)
Dept: LAB | Facility: HOSPITAL | Age: 65
End: 2020-12-01
Attending: RADIOLOGY
Payer: MEDICAID

## 2020-12-01 DIAGNOSIS — Z01.818 PRE-OP TESTING: ICD-10-CM

## 2020-12-03 ENCOUNTER — HOSPITAL ENCOUNTER (OUTPATIENT)
Dept: INTERVENTIONAL RADIOLOGY/VASCULAR | Facility: HOSPITAL | Age: 65
Discharge: HOME | End: 2020-12-03
Attending: INTERNAL MEDICINE | Admitting: RADIOLOGY
Payer: MEDICAID

## 2020-12-03 VITALS
BODY MASS INDEX: 25 KG/M2 | DIASTOLIC BLOOD PRESSURE: 80 MMHG | RESPIRATION RATE: 16 BRPM | SYSTOLIC BLOOD PRESSURE: 132 MMHG | TEMPERATURE: 98 F | WEIGHT: 155 LBS | HEART RATE: 58 BPM | OXYGEN SATURATION: 96 %

## 2020-12-03 DIAGNOSIS — Z01.818 PRE-OP TESTING: Primary | ICD-10-CM

## 2020-12-03 DIAGNOSIS — Z95.828 PORT-A-CATH IN PLACE: ICD-10-CM

## 2020-12-03 PROCEDURE — 99152 MOD SED SAME PHYS/QHP 5/>YRS: CPT

## 2020-12-03 PROCEDURE — 36590 REMOVAL TUNNELED CV CATH: CPT

## 2020-12-03 PROCEDURE — 36415 COLL VENOUS BLD VENIPUNCTURE: CPT

## 2020-12-03 PROCEDURE — 0JPT3WZ REMOVAL OF TOTALLY IMPLANTABLE VASCULAR ACCESS DEVICE FROM TRUNK SUBCUTANEOUS TISSUE AND FASCIA, PERCUTANEOUS APPROACH: ICD-10-PCS | Performed by: RADIOLOGY

## 2020-12-03 RX ORDER — LIDOCAINE HYDROCHLORIDE 20 MG/ML
INJECTION, SOLUTION EPIDURAL; INFILTRATION; INTRACAUDAL; PERINEURAL
Status: COMPLETED
Start: 2020-12-03 | End: 2020-12-03

## 2020-12-03 RX ORDER — MIDAZOLAM HYDROCHLORIDE 1 MG/ML
INJECTION INTRAMUSCULAR; INTRAVENOUS
Status: COMPLETED
Start: 2020-12-03 | End: 2020-12-03

## 2020-12-03 RX ORDER — SODIUM CHLORIDE 9 MG/ML
INJECTION, SOLUTION INTRAVENOUS CONTINUOUS
Status: DISCONTINUED | OUTPATIENT
Start: 2020-12-03 | End: 2020-12-03

## 2020-12-04 RX ORDER — TRAMADOL HYDROCHLORIDE 50 MG/1
50 TABLET ORAL EVERY 6 HOURS PRN
Qty: 30 TABLET | Refills: 0 | Status: SHIPPED | OUTPATIENT
Start: 2020-12-04 | End: 2021-07-01 | Stop reason: ALTCHOICE

## 2020-12-10 ENCOUNTER — APPOINTMENT (OUTPATIENT)
Dept: HEMATOLOGY/ONCOLOGY | Facility: HOSPITAL | Age: 65
End: 2020-12-10
Attending: INTERNAL MEDICINE
Payer: MEDICAID

## 2021-03-01 ENCOUNTER — OFFICE VISIT (OUTPATIENT)
Dept: SURGERY | Facility: CLINIC | Age: 66
End: 2021-03-01
Payer: MEDICAID

## 2021-03-01 VITALS — HEART RATE: 66 BPM | SYSTOLIC BLOOD PRESSURE: 134 MMHG | DIASTOLIC BLOOD PRESSURE: 76 MMHG

## 2021-03-01 DIAGNOSIS — N39.0 RECURRENT UTI: Primary | ICD-10-CM

## 2021-03-01 LAB
BILIRUB UR QL: NEGATIVE
CLARITY UR: CLEAR
COLOR UR: YELLOW
GLUCOSE UR-MCNC: NEGATIVE MG/DL
KETONES UR-MCNC: NEGATIVE MG/DL
LEUKOCYTE ESTERASE UR QL STRIP.AUTO: NEGATIVE
NITRITE UR QL STRIP.AUTO: NEGATIVE
PH UR: 8 [PH] (ref 5–8)
PROT UR-MCNC: NEGATIVE MG/DL
SP GR UR STRIP: 1.01 (ref 1–1.03)
UROBILINOGEN UR STRIP-ACNC: <2

## 2021-03-01 PROCEDURE — 3075F SYST BP GE 130 - 139MM HG: CPT | Performed by: UROLOGY

## 2021-03-01 PROCEDURE — 99244 OFF/OP CNSLTJ NEW/EST MOD 40: CPT | Performed by: UROLOGY

## 2021-03-01 PROCEDURE — 3078F DIAST BP <80 MM HG: CPT | Performed by: UROLOGY

## 2021-03-01 RX ORDER — CIPROFLOXACIN 500 MG/1
500 TABLET, FILM COATED ORAL 2 TIMES DAILY
Qty: 10 TABLET | Refills: 1 | Status: SHIPPED | OUTPATIENT
Start: 2021-03-01 | End: 2021-07-01 | Stop reason: ALTCHOICE

## 2021-03-01 NOTE — PROGRESS NOTES
SUBJECTIVE:  Wayne Bowman is a 63year old female who presents for a consultation at the request of, and a copy of this note will be sent to, Dr. Bere Sanchez, for evaluation of  Recurrent UTI.   These started while she was on chemotherapy for me Social History: Social History    Tobacco Use      Smoking status: Current Some Day Smoker        Packs/day: 0.50        Years: 30.00        Pack years: 15        Types: Cigarettes      Smokeless tobacco: Never Used      Tobacco comment: plan to quit

## 2021-03-23 ENCOUNTER — TELEPHONE (OUTPATIENT)
Dept: INTERNAL MEDICINE CLINIC | Facility: CLINIC | Age: 66
End: 2021-03-23

## 2021-04-01 ENCOUNTER — HOSPITAL ENCOUNTER (OUTPATIENT)
Dept: ULTRASOUND IMAGING | Facility: HOSPITAL | Age: 66
Discharge: HOME | End: 2021-04-01
Attending: UROLOGY
Payer: MEDICAID

## 2021-04-01 DIAGNOSIS — N39.0 RECURRENT UTI: ICD-10-CM

## 2021-04-01 PROCEDURE — 76770 US EXAM ABDO BACK WALL COMP: CPT | Performed by: UROLOGY

## 2021-04-13 ENCOUNTER — OFFICE VISIT (OUTPATIENT)
Dept: SURGERY | Facility: CLINIC | Age: 66
End: 2021-04-13
Payer: MEDICAID

## 2021-04-13 VITALS — DIASTOLIC BLOOD PRESSURE: 85 MMHG | HEART RATE: 60 BPM | SYSTOLIC BLOOD PRESSURE: 144 MMHG

## 2021-04-13 DIAGNOSIS — N39.0 RECURRENT UTI: Primary | ICD-10-CM

## 2021-04-13 PROCEDURE — 3077F SYST BP >= 140 MM HG: CPT | Performed by: UROLOGY

## 2021-04-13 PROCEDURE — 99213 OFFICE O/P EST LOW 20 MIN: CPT | Performed by: UROLOGY

## 2021-04-13 PROCEDURE — 3079F DIAST BP 80-89 MM HG: CPT | Performed by: UROLOGY

## 2021-04-13 NOTE — PROGRESS NOTES
Lisa Blair is a 63year old female. HPI:   Patient presents with: Follow - Up: PT presents for follow up visit to discuss imaging tests. PT states symptoms are improving.      51-year-old female in follow-up to visit March 1, 2021 for evaluati MG Oral Tab Take 1 tablet (25 mg total) by mouth daily. 30 tablet 11   • Ciprofloxacin HCl 500 MG Oral Tab Take 1 tablet (500 mg total) by mouth 2 (two) times daily.  10 tablet 1   • Rosuvastatin Calcium 20 MG Oral Tab Take 1 tablet (20 mg total) by mouth n encounter.       Meds This Visit:  Requested Prescriptions      No prescriptions requested or ordered in this encounter       Imaging & Referrals:  None     4/13/2021  Rita Burton MD

## 2021-05-11 ENCOUNTER — HOSPITAL ENCOUNTER (OUTPATIENT)
Dept: MAMMOGRAPHY | Facility: HOSPITAL | Age: 66
Discharge: HOME | End: 2021-05-11
Attending: INTERNAL MEDICINE
Payer: MEDICAID

## 2021-05-11 DIAGNOSIS — Z12.31 SCREENING MAMMOGRAM, ENCOUNTER FOR: ICD-10-CM

## 2021-05-11 PROCEDURE — 77067 SCR MAMMO BI INCL CAD: CPT | Performed by: INTERNAL MEDICINE

## 2021-05-11 PROCEDURE — 77063 BREAST TOMOSYNTHESIS BI: CPT | Performed by: INTERNAL MEDICINE

## 2021-05-17 ENCOUNTER — OFFICE VISIT (OUTPATIENT)
Dept: HEMATOLOGY/ONCOLOGY | Facility: HOSPITAL | Age: 66
End: 2021-05-17
Attending: INTERNAL MEDICINE
Payer: MEDICAID

## 2021-05-17 VITALS
SYSTOLIC BLOOD PRESSURE: 132 MMHG | OXYGEN SATURATION: 99 % | WEIGHT: 158 LBS | RESPIRATION RATE: 18 BRPM | HEART RATE: 60 BPM | TEMPERATURE: 98 F | DIASTOLIC BLOOD PRESSURE: 72 MMHG | BODY MASS INDEX: 25.39 KG/M2 | HEIGHT: 66 IN

## 2021-05-17 DIAGNOSIS — M54.50 CHRONIC MIDLINE LOW BACK PAIN WITHOUT SCIATICA: ICD-10-CM

## 2021-05-17 DIAGNOSIS — T45.1X5A PERIPHERAL NEUROPATHY DUE TO CHEMOTHERAPY (HCC): ICD-10-CM

## 2021-05-17 DIAGNOSIS — Z85.3 ENCOUNTER FOR FOLLOW-UP SURVEILLANCE OF BREAST CANCER: ICD-10-CM

## 2021-05-17 DIAGNOSIS — G62.0 PERIPHERAL NEUROPATHY DUE TO CHEMOTHERAPY (HCC): ICD-10-CM

## 2021-05-17 DIAGNOSIS — Z85.3 HISTORY OF RIGHT BREAST CANCER: Primary | ICD-10-CM

## 2021-05-17 DIAGNOSIS — Z51.81 ENCOUNTER FOR MEDICATION MONITORING: ICD-10-CM

## 2021-05-17 DIAGNOSIS — Z08 ENCOUNTER FOR FOLLOW-UP SURVEILLANCE OF BREAST CANCER: ICD-10-CM

## 2021-05-17 DIAGNOSIS — G89.29 CHRONIC MIDLINE LOW BACK PAIN WITHOUT SCIATICA: ICD-10-CM

## 2021-05-17 PROCEDURE — 99214 OFFICE O/P EST MOD 30 MIN: CPT | Performed by: INTERNAL MEDICINE

## 2021-05-17 NOTE — PROGRESS NOTES
HPI:  Jerrod Greenfield is a 63year old female with diagnosis of History of right breast cancer  (primary encounter diagnosis)  Encounter for follow-up surveillance of breast cancer  Peripheral neuropathy due to chemotherapy (hcc)  Encounter for [facility] times daily. , Disp: 10 tablet, Rfl: 1  •  Rosuvastatin Calcium 20 MG Oral Tab, Take 1 tablet (20 mg total) by mouth nightly., Disp: 90 tablet, Rfl: 3  •  traMADol HCl 50 MG Oral Tab, Take 1 tablet (50 mg total) by mouth every 6 (six) hours as needed for Pa 3 weeks (last 2/22/17)      Past Surgical History:   Procedure Laterality Date   • CHEMOTHERAPY     • DILATION/CURETTAGE,DIAGNOSTIC     • LUMPECTOMY RIGHT  2017   • OTHER      excision of lipoma at L arm and lower back   • RADIATION RIGHT        Family His cancer  Peripheral neuropathy due to chemotherapy (hcc)  Encounter for medication monitoring    Breast cancer of upper-outer quadrant of right female breast [facility])    Staging form: Breast, AJCC V7      Clinical stage from 10/5/2016: Stage IIA (T1c(3), N1, M changes are seen in the right breast.  The parenchyma pattern is stable with no new suspicious asymmetry, mass, architectural distortion, or microcalcifications identified in either breast.                  Impression  CONCLUSION:  Benign findings.   No gricelda

## 2021-05-26 ENCOUNTER — HOSPITAL ENCOUNTER (OUTPATIENT)
Dept: GENERAL RADIOLOGY | Age: 66
Discharge: HOME | End: 2021-05-26
Attending: INTERNAL MEDICINE
Payer: MEDICAID

## 2021-05-26 DIAGNOSIS — M54.50 CHRONIC MIDLINE LOW BACK PAIN WITHOUT SCIATICA: ICD-10-CM

## 2021-05-26 DIAGNOSIS — G89.29 CHRONIC MIDLINE LOW BACK PAIN WITHOUT SCIATICA: ICD-10-CM

## 2021-05-26 PROCEDURE — 72110 X-RAY EXAM L-2 SPINE 4/>VWS: CPT | Performed by: INTERNAL MEDICINE

## 2021-05-27 DIAGNOSIS — M48.061 SPINAL STENOSIS OF LUMBAR REGION, UNSPECIFIED WHETHER NEUROGENIC CLAUDICATION PRESENT: Primary | ICD-10-CM

## 2021-07-01 ENCOUNTER — OFFICE VISIT (OUTPATIENT)
Dept: INTERNAL MEDICINE CLINIC | Facility: CLINIC | Age: 66
End: 2021-07-01
Payer: MEDICAID

## 2021-07-01 VITALS
DIASTOLIC BLOOD PRESSURE: 76 MMHG | SYSTOLIC BLOOD PRESSURE: 118 MMHG | OXYGEN SATURATION: 99 % | WEIGHT: 155 LBS | HEIGHT: 66 IN | HEART RATE: 60 BPM | BODY MASS INDEX: 24.91 KG/M2

## 2021-07-01 DIAGNOSIS — M47.16 SPONDYLOSIS OF LUMBAR SPINE WITH MYELOPATHY: ICD-10-CM

## 2021-07-01 DIAGNOSIS — E03.8 OTHER SPECIFIED HYPOTHYROIDISM: ICD-10-CM

## 2021-07-01 DIAGNOSIS — M25.552 POSTERIOR PAIN OF HIP, LEFT: ICD-10-CM

## 2021-07-01 DIAGNOSIS — Z17.1 MALIGNANT NEOPLASM OF LOWER-INNER QUADRANT OF RIGHT BREAST OF FEMALE, ESTROGEN RECEPTOR NEGATIVE (HCC): ICD-10-CM

## 2021-07-01 DIAGNOSIS — M81.8 OSTEOPOROSIS OF DISUSE: ICD-10-CM

## 2021-07-01 DIAGNOSIS — C50.311 MALIGNANT NEOPLASM OF LOWER-INNER QUADRANT OF RIGHT BREAST OF FEMALE, ESTROGEN RECEPTOR NEGATIVE (HCC): ICD-10-CM

## 2021-07-01 DIAGNOSIS — E78.2 MIXED HYPERLIPIDEMIA: ICD-10-CM

## 2021-07-01 DIAGNOSIS — Z00.00 WELLNESS EXAMINATION: Primary | ICD-10-CM

## 2021-07-01 LAB
ALBUMIN SERPL-MCNC: 3.8 G/DL (ref 3.4–5)
ALBUMIN/GLOB SERPL: 1.2 {RATIO} (ref 1–2)
ALP LIVER SERPL-CCNC: 62 U/L
ALT SERPL-CCNC: 18 U/L
ANION GAP SERPL CALC-SCNC: 5 MMOL/L (ref 0–18)
AST SERPL-CCNC: 16 U/L (ref 15–37)
BASOPHILS # BLD AUTO: 0.05 X10(3) UL (ref 0–0.2)
BASOPHILS NFR BLD AUTO: 0.9 %
BILIRUB SERPL-MCNC: 0.4 MG/DL (ref 0.1–2)
BUN BLD-MCNC: 10 MG/DL (ref 7–18)
BUN/CREAT SERPL: 19.2 (ref 10–20)
CALCIUM BLD-MCNC: 9.4 MG/DL (ref 8.5–10.1)
CHLORIDE SERPL-SCNC: 102 MMOL/L (ref 98–112)
CHOLEST SMN-MCNC: 186 MG/DL (ref ?–200)
CO2 SERPL-SCNC: 28 MMOL/L (ref 21–32)
CREAT BLD-MCNC: 0.52 MG/DL
DEPRECATED RDW RBC AUTO: 50.4 FL (ref 35.1–46.3)
EOSINOPHIL # BLD AUTO: 0.09 X10(3) UL (ref 0–0.7)
EOSINOPHIL NFR BLD AUTO: 1.5 %
ERYTHROCYTE [DISTWIDTH] IN BLOOD BY AUTOMATED COUNT: 15 % (ref 11–15)
GLOBULIN PLAS-MCNC: 3.1 G/DL (ref 2.8–4.4)
GLUCOSE BLD-MCNC: 92 MG/DL (ref 70–99)
HCT VFR BLD AUTO: 40 %
HDLC SERPL-MCNC: 68 MG/DL (ref 40–59)
HGB BLD-MCNC: 13.2 G/DL
IMM GRANULOCYTES # BLD AUTO: 0.02 X10(3) UL (ref 0–1)
IMM GRANULOCYTES NFR BLD: 0.3 %
LDLC SERPL CALC-MCNC: 104 MG/DL (ref ?–100)
LYMPHOCYTES # BLD AUTO: 1.5 X10(3) UL (ref 1–4)
LYMPHOCYTES NFR BLD AUTO: 25.7 %
M PROTEIN MFR SERPL ELPH: 6.9 G/DL (ref 6.4–8.2)
MCH RBC QN AUTO: 30.1 PG (ref 26–34)
MCHC RBC AUTO-ENTMCNC: 33 G/DL (ref 31–37)
MCV RBC AUTO: 91.3 FL
MONOCYTES # BLD AUTO: 0.55 X10(3) UL (ref 0.1–1)
MONOCYTES NFR BLD AUTO: 9.4 %
NEUTROPHILS # BLD AUTO: 3.62 X10 (3) UL (ref 1.5–7.7)
NEUTROPHILS # BLD AUTO: 3.62 X10(3) UL (ref 1.5–7.7)
NEUTROPHILS NFR BLD AUTO: 62.2 %
NONHDLC SERPL-MCNC: 118 MG/DL (ref ?–130)
OSMOLALITY SERPL CALC.SUM OF ELEC: 279 MOSM/KG (ref 275–295)
PATIENT FASTING Y/N/NP: YES
PATIENT FASTING Y/N/NP: YES
PLATELET # BLD AUTO: 243 10(3)UL (ref 150–450)
POTASSIUM SERPL-SCNC: 4.1 MMOL/L (ref 3.5–5.1)
RBC # BLD AUTO: 4.38 X10(6)UL
SODIUM SERPL-SCNC: 135 MMOL/L (ref 136–145)
T4 FREE SERPL-MCNC: 1.1 NG/DL (ref 0.8–1.7)
TRIGL SERPL-MCNC: 79 MG/DL (ref 30–149)
TSI SER-ACNC: 0.52 MIU/ML (ref 0.36–3.74)
VLDLC SERPL CALC-MCNC: 13 MG/DL (ref 0–30)
WBC # BLD AUTO: 5.8 X10(3) UL (ref 4–11)

## 2021-07-01 PROCEDURE — 84443 ASSAY THYROID STIM HORMONE: CPT | Performed by: INTERNAL MEDICINE

## 2021-07-01 PROCEDURE — 82306 VITAMIN D 25 HYDROXY: CPT | Performed by: INTERNAL MEDICINE

## 2021-07-01 PROCEDURE — 85025 COMPLETE CBC W/AUTO DIFF WBC: CPT | Performed by: INTERNAL MEDICINE

## 2021-07-01 PROCEDURE — 80053 COMPREHEN METABOLIC PANEL: CPT | Performed by: INTERNAL MEDICINE

## 2021-07-01 PROCEDURE — 3008F BODY MASS INDEX DOCD: CPT | Performed by: INTERNAL MEDICINE

## 2021-07-01 PROCEDURE — 80061 LIPID PANEL: CPT | Performed by: INTERNAL MEDICINE

## 2021-07-01 PROCEDURE — 3074F SYST BP LT 130 MM HG: CPT | Performed by: INTERNAL MEDICINE

## 2021-07-01 PROCEDURE — 3078F DIAST BP <80 MM HG: CPT | Performed by: INTERNAL MEDICINE

## 2021-07-01 PROCEDURE — 99396 PREV VISIT EST AGE 40-64: CPT | Performed by: INTERNAL MEDICINE

## 2021-07-01 PROCEDURE — 84439 ASSAY OF FREE THYROXINE: CPT | Performed by: INTERNAL MEDICINE

## 2021-07-01 RX ORDER — HYDROCODONE BITARTRATE AND ACETAMINOPHEN 5; 325 MG/1; MG/1
1 TABLET ORAL EVERY 6 HOURS PRN
Qty: 30 TABLET | Refills: 0 | Status: SHIPPED | OUTPATIENT
Start: 2021-07-01

## 2021-07-02 LAB — 25(OH)D3 SERPL-MCNC: 34.7 NG/ML (ref 30–100)

## 2021-07-20 ENCOUNTER — HOSPITAL ENCOUNTER (OUTPATIENT)
Dept: GENERAL RADIOLOGY | Age: 66
Discharge: HOME | End: 2021-07-20
Attending: INTERNAL MEDICINE
Payer: MEDICAID

## 2021-07-20 ENCOUNTER — HOSPITAL ENCOUNTER (OUTPATIENT)
Dept: BONE DENSITY | Age: 66
Discharge: HOME | End: 2021-07-20
Attending: INTERNAL MEDICINE
Payer: MEDICAID

## 2021-07-20 DIAGNOSIS — M81.8 OSTEOPOROSIS OF DISUSE: ICD-10-CM

## 2021-07-20 DIAGNOSIS — M25.552 POSTERIOR PAIN OF HIP, LEFT: ICD-10-CM

## 2021-07-20 PROCEDURE — 73502 X-RAY EXAM HIP UNI 2-3 VIEWS: CPT | Performed by: INTERNAL MEDICINE

## 2021-07-20 PROCEDURE — 77080 DXA BONE DENSITY AXIAL: CPT | Performed by: INTERNAL MEDICINE

## 2021-08-03 ENCOUNTER — OFFICE VISIT (OUTPATIENT)
Dept: PHYSICAL THERAPY | Age: 66
End: 2021-08-03
Attending: INTERNAL MEDICINE
Payer: MEDICAID

## 2021-08-03 DIAGNOSIS — M47.16 SPONDYLOSIS OF LUMBAR SPINE WITH MYELOPATHY: ICD-10-CM

## 2021-08-03 PROCEDURE — 97110 THERAPEUTIC EXERCISES: CPT

## 2021-08-03 PROCEDURE — 97162 PT EVAL MOD COMPLEX 30 MIN: CPT

## 2021-08-03 NOTE — PROGRESS NOTES
SPINE EVALUATION:   Referring Physician: Dr. Sree Fulton  Diagnosis: low back pain, L hip pain     Date of Service: 8/3/2021     PATIENT SUMMARY   John Sierra is a 61year old female who presents to therapy today with complaints of low back pain and L glute med mm dsyfunction. Pt and PT discussed evaluation findings, pathology, POC and HEP. Pt voiced understanding and performs HEP correctly without reported pain.  Skilled Physical Therapy is medically necessary to address the above impairments and reach involved Moderate Complexity decision making due to 3+ personal factors/comorbidities, 3 body structures involved/activity limitations.   PLAN OF CARE:    Goals: (to be met in 6 visits)   · Pt will improve transversus abdominis recruitment to perform proper

## 2021-08-05 ENCOUNTER — OFFICE VISIT (OUTPATIENT)
Dept: PHYSICAL THERAPY | Age: 66
End: 2021-08-05
Attending: INTERNAL MEDICINE
Payer: MEDICAID

## 2021-08-05 DIAGNOSIS — M47.16 SPONDYLOSIS OF LUMBAR SPINE WITH MYELOPATHY: ICD-10-CM

## 2021-08-05 PROCEDURE — 97140 MANUAL THERAPY 1/> REGIONS: CPT

## 2021-08-05 PROCEDURE — 97110 THERAPEUTIC EXERCISES: CPT

## 2021-08-05 NOTE — PROGRESS NOTES
Dx: Low back pain, L hip pain         Insurance (Authorized # of Visits):  [employer] (8 authorized)           Authorizing Physician: Dr. 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

## 2021-08-10 ENCOUNTER — OFFICE VISIT (OUTPATIENT)
Dept: PHYSICAL THERAPY | Age: 66
End: 2021-08-10
Attending: INTERNAL MEDICINE
Payer: MEDICAID

## 2021-08-10 DIAGNOSIS — M47.16 SPONDYLOSIS OF LUMBAR SPINE WITH MYELOPATHY: ICD-10-CM

## 2021-08-10 PROCEDURE — 97110 THERAPEUTIC EXERCISES: CPT

## 2021-08-10 PROCEDURE — 97140 MANUAL THERAPY 1/> REGIONS: CPT

## 2021-08-10 NOTE — PROGRESS NOTES
Dx: Low back pain, L hip pain         Insurance (Authorized # of Visits):  [employer] (8 authorized)           Authorizing Physician: Dr. Bishop Barrow  Next MD visit: none scheduled  Fall Risk: standard         Precautions: n/a             Subjective: Humidity do n mob x20 each  sidelying hip abduction 2x10 each leg  Standing hip abduction RTB 2x10 each  Reverse sliders 2x15 each, UE support Therex:  Review of HEP  -n mob, sidelying hip abd  Piriformis stretch supine 15x5 sec  Sidelying quad stretch 15 x5 sec  Late

## 2021-08-12 ENCOUNTER — OFFICE VISIT (OUTPATIENT)
Dept: PHYSICAL THERAPY | Age: 66
End: 2021-08-12
Attending: INTERNAL MEDICINE
Payer: MEDICAID

## 2021-08-12 DIAGNOSIS — M47.16 SPONDYLOSIS OF LUMBAR SPINE WITH MYELOPATHY: ICD-10-CM

## 2021-08-12 PROCEDURE — 97140 MANUAL THERAPY 1/> REGIONS: CPT

## 2021-08-12 PROCEDURE — 97110 THERAPEUTIC EXERCISES: CPT

## 2021-08-12 NOTE — PROGRESS NOTES
Dx: Low back pain, L hip pain         Insurance (Authorized # of Visits):  [employer] Snell (8 authorized)           Authorizing Physician: Dr. Genet Santana  Next MD visit: none scheduled  Fall Risk: standard         Precautions: n/a             Subjective: Noticing th pain with flexion recheck at end range     Therex:  Standing lumbar extensions x10  LTR x30  90/90 sciatic n mob x20 each  sidelying hip abduction 2x10 each leg  Standing hip abduction RTB 2x10 each  Reverse sliders 2x15 each, UE support Therex:  Review of

## 2021-08-17 ENCOUNTER — OFFICE VISIT (OUTPATIENT)
Dept: PHYSICAL THERAPY | Age: 66
End: 2021-08-17
Attending: INTERNAL MEDICINE
Payer: MEDICAID

## 2021-08-17 PROCEDURE — 97140 MANUAL THERAPY 1/> REGIONS: CPT

## 2021-08-17 PROCEDURE — 97110 THERAPEUTIC EXERCISES: CPT

## 2021-08-17 NOTE — PROGRESS NOTES
Dx: Low back pain, L hip pain         Insurance (Authorized # of Visits):  Sabiha Carey (8 authorized)           Authorizing Physician: Dr. Lata Barlow  Next MD visit: none scheduled  Fall Risk: standard         Precautions: n/a             Subjective: Can see pro Therex:  Standing lumbar extensions x10  LTR x30  90/90 sciatic n mob x20 each  sidelying hip abduction 2x10 each leg  Standing hip abduction RTB 2x10 each  Reverse sliders 2x15 each, UE support Therex:  Review of HEP  -n mob, sidelying hip abd  Piriform

## 2021-08-19 ENCOUNTER — OFFICE VISIT (OUTPATIENT)
Dept: PHYSICAL THERAPY | Age: 66
End: 2021-08-19
Attending: INTERNAL MEDICINE
Payer: MEDICAID

## 2021-08-19 PROCEDURE — 97140 MANUAL THERAPY 1/> REGIONS: CPT

## 2021-08-19 PROCEDURE — 97110 THERAPEUTIC EXERCISES: CPT

## 2021-08-19 NOTE — PROGRESS NOTES
ProgressSummary  Pt has attended 6 visits in Physical Therapy.    Dx: Low back pain, L hip pain         Insurance (Authorized # of Visits):  Elvira Woodall (8 authorized)           Authorizing Physician: Dr. Sullivan Memos  Next MD visit: none scheduled  Fall Risk: st achieved in PT. MET     FOTO: pt did not complete at IE    Plan: Continue skilled Physical Therapy 2 x/week or a total of 6 visits over a 90 day period.  Treatment will include: manual therapy, therapeutic exercise, therapeutic activity       Patient/Family supine 15x5 sec  Sidelying quad stretch 15 x5 sec  Lateral band walks GTB x2 laps  Hip abd wall pushes x15, 5 sec holds Therex:  SLR 2x10 focus on quad contraction  L2 clams 3x10  Squatting with bands for hip ER BlTB x20 total, slow controlled motion  John Tolentino

## 2021-08-24 ENCOUNTER — OFFICE VISIT (OUTPATIENT)
Dept: PHYSICAL THERAPY | Age: 66
End: 2021-08-24
Attending: INTERNAL MEDICINE
Payer: MEDICAID

## 2021-08-24 PROCEDURE — 97140 MANUAL THERAPY 1/> REGIONS: CPT

## 2021-08-24 PROCEDURE — 97110 THERAPEUTIC EXERCISES: CPT

## 2021-08-24 NOTE — PROGRESS NOTES
Dx: Low back pain, L hip pain         Insurance (Authorized # of Visits):  Nellie Bullock (8 authorized)           Authorizing Physician: Dr. Tamar Waggoner  Next MD visit: none scheduled  Fall Risk: standard         Precautions: n/a             Subjective: Used the ba L5/S1 L UPA grade III   -improved L SB motion  -no change in stair climb  STM to quad, glute med  -less pain with stair Manual therapy  STM to lateral quad, glute med/min  -significantly improved stair climb  -'almost no pain' reported  SNAG lumbar flexion abduction    Charges: 2 therex, 1 manual therapy       Total Timed Treatment: 40 min  Total Treatment Time: 40 min

## 2021-08-26 ENCOUNTER — APPOINTMENT (OUTPATIENT)
Dept: PHYSICAL THERAPY | Age: 66
End: 2021-08-26
Attending: INTERNAL MEDICINE
Payer: MEDICAID

## 2021-08-31 ENCOUNTER — APPOINTMENT (OUTPATIENT)
Dept: PHYSICAL THERAPY | Age: 66
End: 2021-08-31
Attending: INTERNAL MEDICINE
Payer: MEDICAID

## 2021-09-29 ENCOUNTER — APPOINTMENT (OUTPATIENT)
Dept: PHYSICAL THERAPY | Age: 66
End: 2021-09-29
Attending: INTERNAL MEDICINE
Payer: MEDICAID

## 2021-09-30 ENCOUNTER — TELEPHONE (OUTPATIENT)
Dept: PHYSICAL THERAPY | Age: 66
End: 2021-09-30

## 2021-09-30 ENCOUNTER — OFFICE VISIT (OUTPATIENT)
Dept: PHYSICAL THERAPY | Age: 66
End: 2021-09-30
Attending: INTERNAL MEDICINE
Payer: MEDICAID

## 2021-09-30 PROCEDURE — 97140 MANUAL THERAPY 1/> REGIONS: CPT

## 2021-09-30 PROCEDURE — 97110 THERAPEUTIC EXERCISES: CPT

## 2021-09-30 NOTE — PROGRESS NOTES
Dx: Low back pain, L hip pain         Insurance (Authorized # of Visits):  Toni Sterling (8 authorized)           Authorizing Physician: Dr. Rose Browning  Next MD visit: none scheduled  Fall Risk: standard         Precautions: n/a             Subjective: Lower back measures  Half kneeling hip flexor stretch for L x20, 3 sec holds  Sidelying hip abduction 3x10, with YTB resistance  Staggered stance squats for L leg bias 3x10  Lateral step downs 4\" 2x10  Calf stretch x20 on slant board  Lateral band walks x2 laps BlTB

## 2021-10-06 ENCOUNTER — LAB ENCOUNTER (OUTPATIENT)
Dept: LAB | Facility: HOSPITAL | Age: 66
End: 2021-10-06
Attending: UROLOGY
Payer: MEDICAID

## 2021-10-06 DIAGNOSIS — N39.0 RECURRENT UTI: ICD-10-CM

## 2021-10-06 PROCEDURE — 87086 URINE CULTURE/COLONY COUNT: CPT

## 2021-10-14 ENCOUNTER — OFFICE VISIT (OUTPATIENT)
Dept: PHYSICAL THERAPY | Age: 66
End: 2021-10-14
Attending: INTERNAL MEDICINE
Payer: MEDICAID

## 2021-10-14 ENCOUNTER — IMMUNIZATION (OUTPATIENT)
Dept: INTERNAL MEDICINE CLINIC | Facility: CLINIC | Age: 66
End: 2021-10-14
Payer: MEDICAID

## 2021-10-14 DIAGNOSIS — Z23 NEED FOR VACCINATION: Primary | ICD-10-CM

## 2021-10-14 PROCEDURE — 97140 MANUAL THERAPY 1/> REGIONS: CPT

## 2021-10-14 PROCEDURE — 97110 THERAPEUTIC EXERCISES: CPT

## 2021-10-14 PROCEDURE — 90471 IMMUNIZATION ADMIN: CPT | Performed by: INTERNAL MEDICINE

## 2021-10-14 PROCEDURE — 90686 IIV4 VACC NO PRSV 0.5 ML IM: CPT | Performed by: INTERNAL MEDICINE

## 2021-10-14 NOTE — PROGRESS NOTES
Dx: Low back pain, L hip pain         Insurance (Authorized # of Visits):  Leandra Pierson (8 authorized)           Authorizing Physician: Dr. Sheryl Palmer  Next MD visit: none scheduled  Fall Risk: standard         Precautions: n/a             Subjective: Lower back downs 4\" 2x10  Calf stretch x20 on slant board  Lateral band walks x2 laps BlTB Therex:   Prone quad stretch with strap [id number]  Piriformis stretch [id number]  Review of HEP  -pt with significant amount of exercises, as she has requested all to be printed, d

## 2021-11-01 ENCOUNTER — APPOINTMENT (OUTPATIENT)
Dept: PHYSICAL THERAPY | Age: 66
End: 2021-11-01
Attending: INTERNAL MEDICINE
Payer: MEDICAID

## 2021-11-16 ENCOUNTER — OFFICE VISIT (OUTPATIENT)
Dept: PHYSICAL THERAPY | Age: 66
End: 2021-11-16
Attending: INTERNAL MEDICINE
Payer: MEDICAID

## 2021-11-16 PROCEDURE — 97140 MANUAL THERAPY 1/> REGIONS: CPT

## 2021-11-16 PROCEDURE — 97110 THERAPEUTIC EXERCISES: CPT

## 2021-11-16 NOTE — PROGRESS NOTES
Maddy  Pt has attended 10 visits in Physical Therapy.    Dx: Low back pain, L hip pain         Insurance (Authorized # of Visits):  [employer] (8 authorized)           Authorizing Physician: Dr. Emelyn Flores  Next MD visit: none scheduled  Fall Risk: spinal safety. MET  · Pt will improve lumbar spine AROM flexion to WNL, pain free to allow increase ease with bending forward to don shoes. MET  · Pt will improve hip abd strength to 5/5 to tolerate stair climbing.   MET  · Pt will be independent and compli HEP  -pt with significant amount of exercises, as she has requested all to be printed, discussion of expectations on performance  SLS on airex, contralateral hip swings until standing leg fatigue performed B  Forward lunges onto BOSU each leg - 2x15  There

## 2021-11-22 ENCOUNTER — APPOINTMENT (OUTPATIENT)
Dept: HEMATOLOGY/ONCOLOGY | Facility: HOSPITAL | Age: 66
End: 2021-11-22
Attending: INTERNAL MEDICINE
Payer: MEDICAID

## 2021-12-02 ENCOUNTER — OFFICE VISIT (OUTPATIENT)
Dept: HEMATOLOGY/ONCOLOGY | Facility: HOSPITAL | Age: 66
End: 2021-12-02
Attending: INTERNAL MEDICINE
Payer: MEDICAID

## 2021-12-02 VITALS
OXYGEN SATURATION: 98 % | HEIGHT: 66 IN | DIASTOLIC BLOOD PRESSURE: 76 MMHG | RESPIRATION RATE: 18 BRPM | WEIGHT: 155 LBS | SYSTOLIC BLOOD PRESSURE: 137 MMHG | BODY MASS INDEX: 24.91 KG/M2 | TEMPERATURE: 98 F | HEART RATE: 57 BPM

## 2021-12-02 DIAGNOSIS — Z85.3 ENCOUNTER FOR FOLLOW-UP SURVEILLANCE OF BREAST CANCER: ICD-10-CM

## 2021-12-02 DIAGNOSIS — Z12.31 SCREENING MAMMOGRAM, ENCOUNTER FOR: ICD-10-CM

## 2021-12-02 DIAGNOSIS — Z85.3 HISTORY OF RIGHT BREAST CANCER: Primary | ICD-10-CM

## 2021-12-02 DIAGNOSIS — Z08 ENCOUNTER FOR FOLLOW-UP SURVEILLANCE OF BREAST CANCER: ICD-10-CM

## 2021-12-02 PROCEDURE — 99213 OFFICE O/P EST LOW 20 MIN: CPT | Performed by: INTERNAL MEDICINE

## 2021-12-02 NOTE — PROGRESS NOTES
HPI:  Deatrice Lanes is a 61year old female with diagnosis of History of right breast cancer  (primary encounter diagnosis)  Encounter for follow-up surveillance of breast cancer  Screening mammogram, encounter for    Feeling OK.     States doing P tablet, Rfl: 3  •  Mometasone Furo-Formoterol Fum (DULERA) 100-5 MCG/ACT Inhalation Aerosol, Inhale 2 puffs into the lungs 2 (two) times daily. , Disp: 1 Inhaler, Rfl: 1  •  Albuterol Sulfate  (90 Base) MCG/ACT Inhalation Aero Soln, Inhale 2 puffs in (154 lb 15.7 oz)  10/15/20 : 70.3 kg (155 lb)  06/06/20 : 72.3 kg (159 lb 6.4 oz)    General: Patient is alert, not in acute distress. HEENT: EOMs intact. PERRL. Oropharynx is clear. Neck: No JVD. No palpable lymphadenopathy. Neck is supple.   Chest: Yannick

## 2021-12-03 RX ORDER — ROSUVASTATIN CALCIUM 20 MG/1
20 TABLET, COATED ORAL NIGHTLY
Qty: 90 TABLET | Refills: 3 | Status: SHIPPED | OUTPATIENT
Start: 2021-12-03

## 2021-12-03 RX ORDER — LEVOTHYROXINE SODIUM 0.1 MG/1
100 TABLET ORAL DAILY
Qty: 90 TABLET | Refills: 3 | Status: SHIPPED | OUTPATIENT
Start: 2021-12-03 | End: 2022-11-28

## 2022-03-28 RX ORDER — LOSARTAN POTASSIUM 25 MG/1
TABLET ORAL
Qty: 90 TABLET | Refills: 0 | Status: SHIPPED | OUTPATIENT
Start: 2022-03-28

## 2022-04-02 ENCOUNTER — OFFICE VISIT (OUTPATIENT)
Dept: INTERNAL MEDICINE CLINIC | Facility: CLINIC | Age: 67
End: 2022-04-02
Payer: MEDICAID

## 2022-04-02 VITALS
OXYGEN SATURATION: 100 % | BODY MASS INDEX: 24.97 KG/M2 | SYSTOLIC BLOOD PRESSURE: 110 MMHG | WEIGHT: 155.38 LBS | HEIGHT: 66 IN | DIASTOLIC BLOOD PRESSURE: 70 MMHG | HEART RATE: 70 BPM

## 2022-04-02 DIAGNOSIS — E78.2 MIXED HYPERLIPIDEMIA: Primary | ICD-10-CM

## 2022-04-02 DIAGNOSIS — E03.9 ACQUIRED HYPOTHYROIDISM: ICD-10-CM

## 2022-04-02 DIAGNOSIS — E55.9 VITAMIN D DEFICIENCY: ICD-10-CM

## 2022-04-02 DIAGNOSIS — M76.32 ILIOTIBIAL BAND SYNDROME OF LEFT SIDE: ICD-10-CM

## 2022-04-02 DIAGNOSIS — R53.83 OTHER FATIGUE: ICD-10-CM

## 2022-04-02 LAB
ALBUMIN SERPL-MCNC: 4 G/DL (ref 3.4–5)
ALBUMIN/GLOB SERPL: 1.3 {RATIO} (ref 1–2)
ALP LIVER SERPL-CCNC: 74 U/L
ALT SERPL-CCNC: 19 U/L
ANION GAP SERPL CALC-SCNC: 6 MMOL/L (ref 0–18)
AST SERPL-CCNC: 16 U/L (ref 15–37)
BASOPHILS # BLD AUTO: 0.06 X10(3) UL (ref 0–0.2)
BASOPHILS NFR BLD AUTO: 0.8 %
BILIRUB SERPL-MCNC: 0.4 MG/DL (ref 0.1–2)
BUN BLD-MCNC: 14 MG/DL (ref 7–18)
CALCIUM BLD-MCNC: 9.3 MG/DL (ref 8.5–10.1)
CHLORIDE SERPL-SCNC: 101 MMOL/L (ref 98–112)
CHOLEST SERPL-MCNC: 185 MG/DL (ref ?–200)
CO2 SERPL-SCNC: 25 MMOL/L (ref 21–32)
CREAT BLD-MCNC: 0.77 MG/DL
DEPRECATED RDW RBC AUTO: 51.8 FL (ref 35.1–46.3)
EOSINOPHIL # BLD AUTO: 0.16 X10(3) UL (ref 0–0.7)
EOSINOPHIL NFR BLD AUTO: 2.2 %
ERYTHROCYTE [DISTWIDTH] IN BLOOD BY AUTOMATED COUNT: 15.3 % (ref 11–15)
FASTING PATIENT LIPID ANSWER: YES
FASTING STATUS PATIENT QL REPORTED: YES
GLOBULIN PLAS-MCNC: 3.2 G/DL (ref 2.8–4.4)
GLUCOSE BLD-MCNC: 102 MG/DL (ref 70–99)
HDLC SERPL-MCNC: 71 MG/DL (ref 40–59)
HGB BLD-MCNC: 14 G/DL
IMM GRANULOCYTES # BLD AUTO: 0.01 X10(3) UL (ref 0–1)
IMM GRANULOCYTES NFR BLD: 0.1 %
LDLC SERPL CALC-MCNC: 97 MG/DL (ref ?–100)
LYMPHOCYTES # BLD AUTO: 1.58 X10(3) UL (ref 1–4)
LYMPHOCYTES NFR BLD AUTO: 21.6 %
MCH RBC QN AUTO: 31 PG (ref 26–34)
MCHC RBC AUTO-ENTMCNC: 33.5 G/DL (ref 31–37)
MCV RBC AUTO: 92.5 FL
MONOCYTES # BLD AUTO: 0.91 X10(3) UL (ref 0.1–1)
MONOCYTES NFR BLD AUTO: 12.5 %
NEUTROPHILS # BLD AUTO: 4.58 X10 (3) UL (ref 1.5–7.7)
NEUTROPHILS # BLD AUTO: 4.58 X10(3) UL (ref 1.5–7.7)
NEUTROPHILS NFR BLD AUTO: 62.8 %
NONHDLC SERPL-MCNC: 114 MG/DL (ref ?–130)
OSMOLALITY SERPL CALC.SUM OF ELEC: 275 MOSM/KG (ref 275–295)
PLATELET # BLD AUTO: 258 10(3)UL (ref 150–450)
POTASSIUM SERPL-SCNC: 4.5 MMOL/L (ref 3.5–5.1)
PROT SERPL-MCNC: 7.2 G/DL (ref 6.4–8.2)
RBC # BLD AUTO: 4.52 X10(6)UL
SODIUM SERPL-SCNC: 132 MMOL/L (ref 136–145)
TRIGL SERPL-MCNC: 95 MG/DL (ref 30–149)
TSI SER-ACNC: 3.39 MIU/ML (ref 0.36–3.74)
VLDLC SERPL CALC-MCNC: 16 MG/DL (ref 0–30)
WBC # BLD AUTO: 7.3 X10(3) UL (ref 4–11)

## 2022-04-02 PROCEDURE — 3078F DIAST BP <80 MM HG: CPT | Performed by: INTERNAL MEDICINE

## 2022-04-02 PROCEDURE — 3008F BODY MASS INDEX DOCD: CPT | Performed by: INTERNAL MEDICINE

## 2022-04-02 PROCEDURE — 99214 OFFICE O/P EST MOD 30 MIN: CPT | Performed by: INTERNAL MEDICINE

## 2022-04-02 PROCEDURE — 80053 COMPREHEN METABOLIC PANEL: CPT | Performed by: INTERNAL MEDICINE

## 2022-04-02 PROCEDURE — 85025 COMPLETE CBC W/AUTO DIFF WBC: CPT | Performed by: INTERNAL MEDICINE

## 2022-04-02 PROCEDURE — 3074F SYST BP LT 130 MM HG: CPT | Performed by: INTERNAL MEDICINE

## 2022-04-02 PROCEDURE — 80061 LIPID PANEL: CPT | Performed by: INTERNAL MEDICINE

## 2022-04-02 PROCEDURE — 84443 ASSAY THYROID STIM HORMONE: CPT | Performed by: INTERNAL MEDICINE

## 2022-05-05 ENCOUNTER — OFFICE VISIT (OUTPATIENT)
Dept: PHYSICAL THERAPY | Age: 67
End: 2022-05-05
Attending: INTERNAL MEDICINE
Payer: MEDICAID

## 2022-05-05 DIAGNOSIS — M76.32 ILIOTIBIAL BAND SYNDROME OF LEFT SIDE: ICD-10-CM

## 2022-05-05 PROCEDURE — 97162 PT EVAL MOD COMPLEX 30 MIN: CPT

## 2022-05-05 PROCEDURE — 97110 THERAPEUTIC EXERCISES: CPT

## 2022-05-09 ENCOUNTER — OFFICE VISIT (OUTPATIENT)
Dept: PHYSICAL THERAPY | Age: 67
End: 2022-05-09
Attending: INTERNAL MEDICINE
Payer: MEDICAID

## 2022-05-09 DIAGNOSIS — M76.32 ILIOTIBIAL BAND SYNDROME OF LEFT SIDE: ICD-10-CM

## 2022-05-09 PROCEDURE — 97110 THERAPEUTIC EXERCISES: CPT

## 2022-05-09 PROCEDURE — 97140 MANUAL THERAPY 1/> REGIONS: CPT

## 2022-05-11 ENCOUNTER — OFFICE VISIT (OUTPATIENT)
Dept: PHYSICAL THERAPY | Age: 67
End: 2022-05-11
Attending: INTERNAL MEDICINE
Payer: MEDICAID

## 2022-05-11 DIAGNOSIS — M76.32 ILIOTIBIAL BAND SYNDROME OF LEFT SIDE: ICD-10-CM

## 2022-05-11 PROCEDURE — 97110 THERAPEUTIC EXERCISES: CPT

## 2022-05-11 PROCEDURE — 97140 MANUAL THERAPY 1/> REGIONS: CPT

## 2022-05-17 ENCOUNTER — HOSPITAL ENCOUNTER (OUTPATIENT)
Dept: MAMMOGRAPHY | Facility: HOSPITAL | Age: 67
Discharge: HOME | End: 2022-05-17
Attending: INTERNAL MEDICINE
Payer: MEDICAID

## 2022-05-17 ENCOUNTER — OFFICE VISIT (OUTPATIENT)
Dept: PHYSICAL THERAPY | Age: 67
End: 2022-05-17
Attending: INTERNAL MEDICINE
Payer: MEDICAID

## 2022-05-17 DIAGNOSIS — Z12.31 SCREENING MAMMOGRAM, ENCOUNTER FOR: ICD-10-CM

## 2022-05-17 DIAGNOSIS — M76.32 ILIOTIBIAL BAND SYNDROME OF LEFT SIDE: ICD-10-CM

## 2022-05-17 PROCEDURE — 77067 SCR MAMMO BI INCL CAD: CPT | Performed by: INTERNAL MEDICINE

## 2022-05-17 PROCEDURE — 97140 MANUAL THERAPY 1/> REGIONS: CPT

## 2022-05-17 PROCEDURE — 97110 THERAPEUTIC EXERCISES: CPT

## 2022-05-17 PROCEDURE — 77063 BREAST TOMOSYNTHESIS BI: CPT | Performed by: INTERNAL MEDICINE

## 2022-05-19 ENCOUNTER — OFFICE VISIT (OUTPATIENT)
Dept: PHYSICAL THERAPY | Age: 67
End: 2022-05-19
Attending: INTERNAL MEDICINE
Payer: MEDICAID

## 2022-05-19 DIAGNOSIS — M76.32 ILIOTIBIAL BAND SYNDROME OF LEFT SIDE: ICD-10-CM

## 2022-05-19 PROCEDURE — 97110 THERAPEUTIC EXERCISES: CPT

## 2022-05-19 PROCEDURE — 97140 MANUAL THERAPY 1/> REGIONS: CPT

## 2022-05-23 ENCOUNTER — OFFICE VISIT (OUTPATIENT)
Dept: PHYSICAL THERAPY | Age: 67
End: 2022-05-23
Attending: INTERNAL MEDICINE
Payer: MEDICAID

## 2022-05-23 DIAGNOSIS — M76.32 ILIOTIBIAL BAND SYNDROME OF LEFT SIDE: ICD-10-CM

## 2022-05-23 PROCEDURE — 97110 THERAPEUTIC EXERCISES: CPT

## 2022-05-23 PROCEDURE — 97140 MANUAL THERAPY 1/> REGIONS: CPT

## 2022-05-25 ENCOUNTER — OFFICE VISIT (OUTPATIENT)
Dept: PHYSICAL THERAPY | Age: 67
End: 2022-05-25
Attending: INTERNAL MEDICINE
Payer: MEDICAID

## 2022-05-25 DIAGNOSIS — M76.32 ILIOTIBIAL BAND SYNDROME OF LEFT SIDE: ICD-10-CM

## 2022-05-25 PROCEDURE — 97140 MANUAL THERAPY 1/> REGIONS: CPT

## 2022-05-25 PROCEDURE — 97110 THERAPEUTIC EXERCISES: CPT

## 2022-05-31 ENCOUNTER — APPOINTMENT (OUTPATIENT)
Dept: PHYSICAL THERAPY | Age: 67
End: 2022-05-31
Attending: INTERNAL MEDICINE
Payer: MEDICAID

## 2022-06-02 ENCOUNTER — HOSPITAL ENCOUNTER (OUTPATIENT)
Dept: MAMMOGRAPHY | Facility: HOSPITAL | Age: 67
Discharge: HOME | End: 2022-06-02
Attending: INTERNAL MEDICINE
Payer: MEDICAID

## 2022-06-02 ENCOUNTER — HOSPITAL ENCOUNTER (OUTPATIENT)
Dept: ULTRASOUND IMAGING | Facility: HOSPITAL | Age: 67
Discharge: HOME | End: 2022-06-02
Attending: INTERNAL MEDICINE
Payer: MEDICAID

## 2022-06-02 DIAGNOSIS — R92.8 ABNORMAL MAMMOGRAM: ICD-10-CM

## 2022-06-02 PROCEDURE — 77065 DX MAMMO INCL CAD UNI: CPT | Performed by: INTERNAL MEDICINE

## 2022-06-02 PROCEDURE — 76642 ULTRASOUND BREAST LIMITED: CPT | Performed by: INTERNAL MEDICINE

## 2022-06-02 PROCEDURE — 77061 BREAST TOMOSYNTHESIS UNI: CPT | Performed by: INTERNAL MEDICINE

## 2022-06-07 ENCOUNTER — OFFICE VISIT (OUTPATIENT)
Dept: HEMATOLOGY/ONCOLOGY | Facility: HOSPITAL | Age: 67
End: 2022-06-07
Attending: INTERNAL MEDICINE
Payer: MEDICAID

## 2022-06-07 VITALS
WEIGHT: 156 LBS | BODY MASS INDEX: 25.07 KG/M2 | SYSTOLIC BLOOD PRESSURE: 168 MMHG | RESPIRATION RATE: 16 BRPM | HEIGHT: 66 IN | OXYGEN SATURATION: 98 % | DIASTOLIC BLOOD PRESSURE: 81 MMHG | HEART RATE: 67 BPM | TEMPERATURE: 98 F

## 2022-06-07 DIAGNOSIS — Z12.31 SCREENING MAMMOGRAM, ENCOUNTER FOR: ICD-10-CM

## 2022-06-07 DIAGNOSIS — Z85.3 ENCOUNTER FOR FOLLOW-UP SURVEILLANCE OF BREAST CANCER: ICD-10-CM

## 2022-06-07 DIAGNOSIS — Z85.3 HISTORY OF RIGHT BREAST CANCER: Primary | ICD-10-CM

## 2022-06-07 DIAGNOSIS — Z08 ENCOUNTER FOR FOLLOW-UP SURVEILLANCE OF BREAST CANCER: ICD-10-CM

## 2022-06-07 PROCEDURE — 99213 OFFICE O/P EST LOW 20 MIN: CPT | Performed by: INTERNAL MEDICINE

## 2022-06-22 RX ORDER — LOSARTAN POTASSIUM 25 MG/1
TABLET ORAL
Qty: 90 TABLET | Refills: 0 | Status: SHIPPED | OUTPATIENT
Start: 2022-06-22

## 2022-06-23 ENCOUNTER — TELEPHONE (OUTPATIENT)
Dept: INTERNAL MEDICINE CLINIC | Facility: CLINIC | Age: 67
End: 2022-06-23

## 2022-06-29 ENCOUNTER — TELEPHONE (OUTPATIENT)
Dept: PHYSICAL THERAPY | Facility: HOSPITAL | Age: 67
End: 2022-06-29

## 2022-06-29 ENCOUNTER — APPOINTMENT (OUTPATIENT)
Dept: PHYSICAL THERAPY | Age: 67
End: 2022-06-29
Attending: INTERNAL MEDICINE
Payer: MEDICAID

## 2022-12-09 ENCOUNTER — OFFICE VISIT (OUTPATIENT)
Dept: INTERNAL MEDICINE CLINIC | Facility: CLINIC | Age: 67
End: 2022-12-09
Payer: COMMERCIAL

## 2022-12-09 VITALS
WEIGHT: 156 LBS | SYSTOLIC BLOOD PRESSURE: 118 MMHG | OXYGEN SATURATION: 98 % | HEART RATE: 67 BPM | BODY MASS INDEX: 25.07 KG/M2 | HEIGHT: 66 IN | DIASTOLIC BLOOD PRESSURE: 70 MMHG

## 2022-12-09 DIAGNOSIS — Z00.00 WELCOME TO MEDICARE PREVENTIVE VISIT: Primary | ICD-10-CM

## 2022-12-09 DIAGNOSIS — Z85.3 HISTORY OF BREAST CANCER: ICD-10-CM

## 2022-12-09 DIAGNOSIS — E78.2 MIXED HYPERLIPIDEMIA: ICD-10-CM

## 2022-12-09 DIAGNOSIS — E55.9 VITAMIN D DEFICIENCY: ICD-10-CM

## 2022-12-09 DIAGNOSIS — E03.9 ACQUIRED HYPOTHYROIDISM: ICD-10-CM

## 2022-12-09 DIAGNOSIS — R30.0 DYSURIA: ICD-10-CM

## 2022-12-10 LAB
ABSOLUTE BASOPHILS: 49 CELLS/UL (ref 0–200)
ABSOLUTE EOSINOPHILS: 128 CELLS/UL (ref 15–500)
ABSOLUTE LYMPHOCYTES: 1641 CELLS/UL (ref 850–3900)
ABSOLUTE MONOCYTES: 555 CELLS/UL (ref 200–950)
ABSOLUTE NEUTROPHILS: 3727 CELLS/UL (ref 1500–7800)
ALBUMIN/GLOBULIN RATIO: 1.9 (CALC) (ref 1–2.5)
ALBUMIN: 4.4 G/DL (ref 3.6–5.1)
ALKALINE PHOSPHATASE: 66 U/L (ref 37–153)
ALT: 11 U/L (ref 6–29)
AST: 17 U/L (ref 10–35)
BASOPHILS: 0.8 %
BILIRUBIN, TOTAL: 0.4 MG/DL (ref 0.2–1.2)
BUN: 11 MG/DL (ref 7–25)
CALCIUM: 9.5 MG/DL (ref 8.6–10.4)
CARBON DIOXIDE: 25 MMOL/L (ref 20–32)
CHLORIDE: 98 MMOL/L (ref 98–110)
CHOL/HDLC RATIO: 2.9 (CALC)
CHOLESTEROL, TOTAL: 212 MG/DL
CREATININE: 0.58 MG/DL (ref 0.5–1.05)
EGFR: 100 ML/MIN/1.73M2
EOSINOPHILS: 2.1 %
GLOBULIN: 2.3 G/DL (CALC) (ref 1.9–3.7)
GLUCOSE: 95 MG/DL (ref 65–99)
HDL CHOLESTEROL: 74 MG/DL
HEMATOCRIT: 41.5 % (ref 35–45)
HEMOGLOBIN: 14.9 G/DL (ref 11.7–15.5)
LDL-CHOLESTEROL: 121 MG/DL (CALC)
LYMPHOCYTES: 26.9 %
MCH: 32.9 PG (ref 27–33)
MCHC: 35.9 G/DL (ref 32–36)
MCV: 91.6 FL (ref 80–100)
MONOCYTES: 9.1 %
MPV: 10.6 FL (ref 7.5–12.5)
NEUTROPHILS: 61.1 %
NON-HDL CHOLESTEROL: 138 MG/DL (CALC)
PLATELET COUNT: 280 THOUSAND/UL (ref 140–400)
POTASSIUM: 4.4 MMOL/L (ref 3.5–5.3)
PROTEIN, TOTAL: 6.7 G/DL (ref 6.1–8.1)
RDW: 13.8 % (ref 11–15)
RED BLOOD CELL COUNT: 4.53 MILLION/UL (ref 3.8–5.1)
SODIUM: 132 MMOL/L (ref 135–146)
T4, FREE: 1.3 NG/DL (ref 0.8–1.8)
TRIGLYCERIDES: 80 MG/DL
TSH: 3.65 MIU/L (ref 0.4–4.5)
VITAMIN D, 25-OH, TOTAL: 39 NG/ML (ref 30–100)
WHITE BLOOD CELL COUNT: 6.1 THOUSAND/UL (ref 3.8–10.8)

## 2023-05-14 ENCOUNTER — OFFICE VISIT (OUTPATIENT)
Dept: FAMILY MEDICINE CLINIC | Facility: CLINIC | Age: 68
End: 2023-05-14
Payer: COMMERCIAL

## 2023-05-14 VITALS
RESPIRATION RATE: 18 BRPM | SYSTOLIC BLOOD PRESSURE: 152 MMHG | HEART RATE: 75 BPM | WEIGHT: 158.19 LBS | BODY MASS INDEX: 25.42 KG/M2 | DIASTOLIC BLOOD PRESSURE: 83 MMHG | OXYGEN SATURATION: 98 % | TEMPERATURE: 97 F | HEIGHT: 66 IN

## 2023-05-14 DIAGNOSIS — H65.02 ACUTE SEROUS OTITIS MEDIA OF LEFT EAR, RECURRENCE NOT SPECIFIED: Primary | ICD-10-CM

## 2023-05-14 PROCEDURE — 3008F BODY MASS INDEX DOCD: CPT | Performed by: NURSE PRACTITIONER

## 2023-05-14 PROCEDURE — 3077F SYST BP >= 140 MM HG: CPT | Performed by: NURSE PRACTITIONER

## 2023-05-14 PROCEDURE — 99213 OFFICE O/P EST LOW 20 MIN: CPT | Performed by: NURSE PRACTITIONER

## 2023-05-14 PROCEDURE — 3079F DIAST BP 80-89 MM HG: CPT | Performed by: NURSE PRACTITIONER

## 2023-05-14 RX ORDER — AZITHROMYCIN 250 MG/1
TABLET, FILM COATED ORAL
Qty: 6 TABLET | Refills: 0 | Status: SHIPPED | OUTPATIENT
Start: 2023-05-14 | End: 2023-05-19

## 2023-05-19 ENCOUNTER — OFFICE VISIT (OUTPATIENT)
Dept: FAMILY MEDICINE CLINIC | Facility: CLINIC | Age: 68
End: 2023-05-19
Payer: COMMERCIAL

## 2023-05-19 ENCOUNTER — HOSPITAL ENCOUNTER (OUTPATIENT)
Dept: MAMMOGRAPHY | Facility: HOSPITAL | Age: 68
Discharge: HOME | End: 2023-05-19
Attending: INTERNAL MEDICINE
Payer: COMMERCIAL

## 2023-05-19 VITALS
RESPIRATION RATE: 18 BRPM | HEART RATE: 67 BPM | OXYGEN SATURATION: 100 % | WEIGHT: 157 LBS | DIASTOLIC BLOOD PRESSURE: 76 MMHG | TEMPERATURE: 98 F | BODY MASS INDEX: 25.23 KG/M2 | HEIGHT: 66 IN | SYSTOLIC BLOOD PRESSURE: 170 MMHG

## 2023-05-19 DIAGNOSIS — Z12.31 SCREENING MAMMOGRAM, ENCOUNTER FOR: ICD-10-CM

## 2023-05-19 DIAGNOSIS — H65.93 FLUID LEVEL BEHIND TYMPANIC MEMBRANE OF BOTH EARS: Primary | ICD-10-CM

## 2023-05-19 DIAGNOSIS — I10 ELEVATED BLOOD PRESSURE READING IN OFFICE WITH DIAGNOSIS OF HYPERTENSION: ICD-10-CM

## 2023-05-19 PROCEDURE — 3078F DIAST BP <80 MM HG: CPT | Performed by: NURSE PRACTITIONER

## 2023-05-19 PROCEDURE — 3008F BODY MASS INDEX DOCD: CPT | Performed by: NURSE PRACTITIONER

## 2023-05-19 PROCEDURE — 99213 OFFICE O/P EST LOW 20 MIN: CPT | Performed by: NURSE PRACTITIONER

## 2023-05-19 PROCEDURE — 77063 BREAST TOMOSYNTHESIS BI: CPT | Performed by: INTERNAL MEDICINE

## 2023-05-19 PROCEDURE — 3077F SYST BP >= 140 MM HG: CPT | Performed by: NURSE PRACTITIONER

## 2023-05-19 PROCEDURE — 77067 SCR MAMMO BI INCL CAD: CPT | Performed by: INTERNAL MEDICINE

## 2023-05-19 NOTE — PATIENT INSTRUCTIONS
Continue Flonase daily.  Use 1 spray in each nostril in the morning, and 1 spray in each nostril before bed  Tylenol/Motrin as needed  Continue Zyrtec daily  Follow up with PCP or ENT for ongoing symptoms unrelieved by comfort care measures discussed today

## 2023-05-23 NOTE — PROGRESS NOTES
Breast Surgery Surveillance Visit    Diagnosis: Infiltrating Ductal Carcinoma, right breast; ER negative, TX negative, Her-2/clif positive status post lumpectomy with SLNB on March 13, 2017    Stage: Breast cancer of upper-outer quadrant of right female rusty performed without complication. She has completed radiation without complication. She denies any new concerns to her breasts bilaterally. She is here today for evaluation and recommendations for further therapy.         Past Medical History:   Diagnosis D Problem Relation Age of Onset   • Diabetes Mother    • Heart Disorder Mother      pacmaker   • parkinson's disease [OTHER] Mother    • ALS [OTHER] Father    • Breast Cancer Self 63   She is not of Ashkenazi Caodaism ancestry.     Social History:    Alcohol incontinence, decreased urine stream, blood in the urine or vaginal/penile discharge. Skin:    The patient denies rash, itching, skin lesions, dry skin, change in skin color or change in moles.      Hematologic/Lymphatic:  The patient denies easily bruis postlumpectomy changes in the right breast with otherwise stable findings bilaterally.     Impression:   Ms. Reinaldo Tracey is a 63year old woman presents s/p neoadjuvant chemotherapy and Right Lumpectomy w/ SLNB for right Breast cancer of upper-oute 23-May-2023 21:43

## 2023-06-15 ENCOUNTER — OFFICE VISIT (OUTPATIENT)
Dept: HEMATOLOGY/ONCOLOGY | Facility: HOSPITAL | Age: 68
End: 2023-06-15
Attending: INTERNAL MEDICINE
Payer: COMMERCIAL

## 2023-06-15 VITALS
SYSTOLIC BLOOD PRESSURE: 147 MMHG | DIASTOLIC BLOOD PRESSURE: 75 MMHG | HEART RATE: 73 BPM | OXYGEN SATURATION: 97 % | WEIGHT: 154 LBS | HEIGHT: 66 IN | RESPIRATION RATE: 16 BRPM | BODY MASS INDEX: 24.75 KG/M2 | TEMPERATURE: 98 F

## 2023-06-15 DIAGNOSIS — Z85.3 ENCOUNTER FOR FOLLOW-UP SURVEILLANCE OF BREAST CANCER: ICD-10-CM

## 2023-06-15 DIAGNOSIS — Z85.3 HISTORY OF RIGHT BREAST CANCER: Primary | ICD-10-CM

## 2023-06-15 DIAGNOSIS — Z12.31 SCREENING MAMMOGRAM, ENCOUNTER FOR: ICD-10-CM

## 2023-06-15 DIAGNOSIS — Z08 ENCOUNTER FOR FOLLOW-UP SURVEILLANCE OF BREAST CANCER: ICD-10-CM

## 2023-06-15 PROCEDURE — 99213 OFFICE O/P EST LOW 20 MIN: CPT | Performed by: INTERNAL MEDICINE

## 2023-07-31 ENCOUNTER — NURSE TRIAGE (OUTPATIENT)
Dept: INTERNAL MEDICINE CLINIC | Facility: CLINIC | Age: 68
End: 2023-07-31

## 2023-08-01 ENCOUNTER — TELEPHONE (OUTPATIENT)
Dept: INTERNAL MEDICINE CLINIC | Facility: CLINIC | Age: 68
End: 2023-08-01

## 2023-08-05 ENCOUNTER — LAB ENCOUNTER (OUTPATIENT)
Dept: LAB | Facility: HOSPITAL | Age: 68
End: 2023-08-05
Attending: INTERNAL MEDICINE
Payer: COMMERCIAL

## 2023-08-05 PROCEDURE — 84443 ASSAY THYROID STIM HORMONE: CPT | Performed by: INTERNAL MEDICINE

## 2023-08-05 PROCEDURE — 36415 COLL VENOUS BLD VENIPUNCTURE: CPT | Performed by: INTERNAL MEDICINE

## 2023-08-05 PROCEDURE — 80061 LIPID PANEL: CPT | Performed by: INTERNAL MEDICINE

## 2023-08-05 PROCEDURE — 80053 COMPREHEN METABOLIC PANEL: CPT | Performed by: INTERNAL MEDICINE

## 2023-08-05 PROCEDURE — 85025 COMPLETE CBC W/AUTO DIFF WBC: CPT | Performed by: INTERNAL MEDICINE

## 2023-10-13 ENCOUNTER — HOSPITAL ENCOUNTER (OUTPATIENT)
Dept: GENERAL RADIOLOGY | Age: 68
Discharge: HOME | End: 2023-10-13
Attending: INTERNAL MEDICINE
Payer: MEDICARE

## 2023-10-13 ENCOUNTER — OFFICE VISIT (OUTPATIENT)
Dept: INTERNAL MEDICINE CLINIC | Facility: CLINIC | Age: 68
End: 2023-10-13
Payer: COMMERCIAL

## 2023-10-13 VITALS
HEIGHT: 66 IN | BODY MASS INDEX: 24.91 KG/M2 | WEIGHT: 155 LBS | DIASTOLIC BLOOD PRESSURE: 82 MMHG | SYSTOLIC BLOOD PRESSURE: 140 MMHG | HEART RATE: 62 BPM | OXYGEN SATURATION: 93 %

## 2023-10-13 DIAGNOSIS — Z85.3 HX OF BREAST CANCER: ICD-10-CM

## 2023-10-13 DIAGNOSIS — R52 PAIN: ICD-10-CM

## 2023-10-13 DIAGNOSIS — Z00.00 MEDICARE ANNUAL WELLNESS VISIT, SUBSEQUENT: Primary | ICD-10-CM

## 2023-10-13 DIAGNOSIS — E03.9 HYPOTHYROIDISM, UNSPECIFIED TYPE: ICD-10-CM

## 2023-10-13 PROCEDURE — 73630 X-RAY EXAM OF FOOT: CPT | Performed by: INTERNAL MEDICINE

## 2023-10-13 RX ORDER — LEVOTHYROXINE SODIUM 0.1 MG/1
100 TABLET ORAL DAILY
Qty: 90 TABLET | Refills: 3 | Status: SHIPPED | OUTPATIENT
Start: 2023-10-13 | End: 2024-10-07

## 2023-10-13 RX ORDER — LOSARTAN POTASSIUM 25 MG/1
25 TABLET ORAL DAILY
Qty: 90 TABLET | Refills: 3 | Status: SHIPPED | OUTPATIENT
Start: 2023-10-13

## 2023-10-13 RX ORDER — GABAPENTIN 300 MG/1
CAPSULE ORAL
Qty: 90 CAPSULE | Refills: 3 | Status: SHIPPED | OUTPATIENT
Start: 2023-10-13

## 2023-10-13 RX ORDER — ROSUVASTATIN CALCIUM 20 MG/1
20 TABLET, COATED ORAL NIGHTLY
Qty: 90 TABLET | Refills: 3 | Status: SHIPPED | OUTPATIENT
Start: 2023-10-13

## 2023-10-13 RX ORDER — CIPROFLOXACIN 500 MG/1
500 TABLET, FILM COATED ORAL 2 TIMES DAILY
Qty: 28 TABLET | Refills: 0 | Status: SHIPPED | OUTPATIENT
Start: 2023-10-13

## 2023-10-14 LAB
ABSOLUTE BASOPHILS: 42 CELLS/UL (ref 0–200)
ABSOLUTE EOSINOPHILS: 99 CELLS/UL (ref 15–500)
ABSOLUTE LYMPHOCYTES: 1410 CELLS/UL (ref 850–3900)
ABSOLUTE MONOCYTES: 611 CELLS/UL (ref 200–950)
ABSOLUTE NEUTROPHILS: 2538 CELLS/UL (ref 1500–7800)
ALBUMIN/GLOBULIN RATIO: 1.6 (CALC) (ref 1–2.5)
ALBUMIN: 4.1 G/DL (ref 3.6–5.1)
ALKALINE PHOSPHATASE: 64 U/L (ref 37–153)
ALT: 12 U/L (ref 6–29)
AST: 16 U/L (ref 10–35)
BASOPHILS: 0.9 %
BILIRUBIN, TOTAL: 0.4 MG/DL (ref 0.2–1.2)
BUN: 10 MG/DL (ref 7–25)
CALCIUM: 9.5 MG/DL (ref 8.6–10.4)
CARBON DIOXIDE: 27 MMOL/L (ref 20–32)
CHLORIDE: 100 MMOL/L (ref 98–110)
CHOL/HDLC RATIO: 2.5 (CALC)
CHOLESTEROL, TOTAL: 177 MG/DL
CREATININE: 0.57 MG/DL (ref 0.5–1.05)
EGFR: 100 ML/MIN/1.73M2
EOSINOPHILS: 2.1 %
GLOBULIN: 2.5 G/DL (CALC) (ref 1.9–3.7)
GLUCOSE: 106 MG/DL (ref 65–99)
HDL CHOLESTEROL: 71 MG/DL
HEMATOCRIT: 41.2 % (ref 35–45)
HEMOGLOBIN: 13.5 G/DL (ref 11.7–15.5)
LDL-CHOLESTEROL: 90 MG/DL (CALC)
LYMPHOCYTES: 30 %
MCH: 30.5 PG (ref 27–33)
MCHC: 32.8 G/DL (ref 32–36)
MCV: 93 FL (ref 80–100)
MONOCYTES: 13 %
MPV: 10.7 FL (ref 7.5–12.5)
NEUTROPHILS: 54 %
NON-HDL CHOLESTEROL: 106 MG/DL (CALC)
PLATELET COUNT: 261 THOUSAND/UL (ref 140–400)
POTASSIUM: 4.4 MMOL/L (ref 3.5–5.3)
PROTEIN, TOTAL: 6.6 G/DL (ref 6.1–8.1)
RDW: 13.7 % (ref 11–15)
RED BLOOD CELL COUNT: 4.43 MILLION/UL (ref 3.8–5.1)
SODIUM: 136 MMOL/L (ref 135–146)
T4, FREE: 1.4 NG/DL (ref 0.8–1.8)
TRIGLYCERIDES: 71 MG/DL
TSH: 1.44 MIU/L (ref 0.4–4.5)
WHITE BLOOD CELL COUNT: 4.7 THOUSAND/UL (ref 3.8–10.8)

## 2024-02-02 ENCOUNTER — OFFICE VISIT (OUTPATIENT)
Dept: INTERNAL MEDICINE CLINIC | Facility: CLINIC | Age: 69
End: 2024-02-02
Payer: COMMERCIAL

## 2024-02-02 VITALS
HEART RATE: 65 BPM | SYSTOLIC BLOOD PRESSURE: 130 MMHG | WEIGHT: 150 LBS | DIASTOLIC BLOOD PRESSURE: 60 MMHG | BODY MASS INDEX: 24.11 KG/M2 | OXYGEN SATURATION: 98 % | HEIGHT: 66 IN

## 2024-02-02 DIAGNOSIS — J41.0 SIMPLE CHRONIC BRONCHITIS (HCC): Primary | ICD-10-CM

## 2024-02-02 DIAGNOSIS — N39.0 RECURRENT UTI (URINARY TRACT INFECTION): ICD-10-CM

## 2024-02-02 DIAGNOSIS — M47.816 LUMBAR SPONDYLOSIS: ICD-10-CM

## 2024-02-02 DIAGNOSIS — E03.9 HYPOTHYROIDISM, UNSPECIFIED TYPE: ICD-10-CM

## 2024-02-02 DIAGNOSIS — Z85.3 HX OF BREAST CANCER: ICD-10-CM

## 2024-02-02 PROCEDURE — 99214 OFFICE O/P EST MOD 30 MIN: CPT | Performed by: INTERNAL MEDICINE

## 2024-02-02 RX ORDER — CLOBETASOL PROPIONATE 0.5 MG/G
1 CREAM TOPICAL 2 TIMES DAILY
Qty: 30 G | Refills: 2 | Status: SHIPPED | OUTPATIENT
Start: 2024-02-02

## 2024-02-02 RX ORDER — ALBUTEROL SULFATE 90 UG/1
2 AEROSOL, METERED RESPIRATORY (INHALATION) EVERY 6 HOURS PRN
Qty: 1 EACH | Refills: 3 | Status: SHIPPED | OUTPATIENT
Start: 2024-02-02

## 2024-02-02 RX ORDER — LEVOFLOXACIN 500 MG/1
500 TABLET, FILM COATED ORAL DAILY
Qty: 10 TABLET | Refills: 1 | Status: SHIPPED | OUTPATIENT
Start: 2024-02-02 | End: 2024-02-12

## 2024-02-02 NOTE — PROGRESS NOTES
Subjective:   Nicole Mason is a 68 year old female who presents for Cough and Nasal Congestion     Patient here for evaluation of 7 days of productive cough and sore throat. Smoker.  Alos having problems with recurring UTIS . Has lumbar spondylosis which is flaring.  History/Other:    Chief Complaint Reviewed and Verified  No Further Nursing Notes to   Review  Medications Reviewed  Problem List Reviewed         Tobacco:  She currently smokes tobacco.  Social History    Tobacco Use      Smoking status: Some Days        Packs/day: 0.25        Years: 30.00        Additional pack years: 0.00        Total pack years: 7.50        Types: Cigarettes      Smokeless tobacco: Never      Tobacco comments: plan to quit     Tobacco Cessation Documentation (Smoking and Smokeless included):  I had an in depth therapy session with Nicole Mason about her tobacco use risks and options using the USPSTF's Five A's approach:    Ask: Nicole is using tobacco products.  Assess: We asked about/assessed behavioral health risk and factors affecting choice of behavior change goals/methods.  Specifically I asked about readiness to quit tobacco.  Advise: We gave clear, specific, and personalized behavior change advice, including information about personal health harms and benefits. Specifically, she was told that Quitting tobacco is one of the best things she can do for her health. I strongly encouraged her to quit.      Agree: We collaboratively selected appropriate treatment goals and methods based on the patient’s interest in and willingness to change the behavior.   Assist: We used behavior change techniques (self-help and/or counseling), to aid Nicole in achieving agreed-upon goals by acquiring the skills, confidence, and social/environmental supports for behavior change, supplemented with adjunctive medical treatments when appropriate. Additionally, national quitting tobacco aides were discussed.   Arrange: Follow  up at next visit- see specific follow up below.    Time Counseled: 5 minutes       Current Outpatient Medications   Medication Sig Dispense Refill    levoFLOXacin (LEVAQUIN) 500 MG Oral Tab Take 1 tablet (500 mg total) by mouth daily for 10 days. 10 tablet 1    losartan 25 MG Oral Tab Take 1 tablet (25 mg total) by mouth daily. 90 tablet 3    levothyroxine 100 MCG Oral Tab Take 1 tablet (100 mcg total) by mouth daily. 90 tablet 3    rosuvastatin 20 MG Oral Tab Take 1 tablet (20 mg total) by mouth nightly. 90 tablet 3    gabapentin 300 MG Oral Cap TAKE 1 CAPSULE BY MOUTH TWO TIMES DAILY 90 capsule 3    Mometasone Furo-Formoterol Fum (DULERA) 100-5 MCG/ACT Inhalation Aerosol Inhale 2 puffs into the lungs 2 (two) times a day. 1 each 11    HYDROcodone-acetaminophen (NORCO) 5-325 MG Oral Tab Take 1 tablet by mouth every 6 (six) hours as needed for Pain. (Patient not taking: Reported on 10/13/2023) 30 tablet 0    Albuterol Sulfate  (90 Base) MCG/ACT Inhalation Aero Soln Inhale 2 puffs into the lungs every 6 (six) hours as needed for Wheezing. 1 Inhaler 3    Vitamin D3 1000 UNITS Oral Tab Take 1 tablet (1,000 Units total) by mouth daily.      Coenzyme Q10 (COQ10 OR) Take by mouth daily.        LUTEIN-ZEAXANTHIN OR Take by mouth daily.             Review of Systems:  Review of Systems   Constitutional:  Negative for chills and fever.   HENT:  Positive for sore throat.    Respiratory:  Positive for cough. Negative for wheezing.    Cardiovascular:  Negative for chest pain, palpitations and leg swelling.   Musculoskeletal:  Positive for back pain.   Skin:  Positive for rash (eczemetous rash on pretibia).   Neurological:  Negative for numbness.         Objective:   /60   Pulse 65   Ht 5' 6\" (1.676 m)   Wt 150 lb (68 kg)   SpO2 98%   BMI 24.21 kg/m²  Estimated body mass index is 24.21 kg/m² as calculated from the following:    Height as of this encounter: 5' 6\" (1.676 m).    Weight as of this encounter: 150 lb  (68 kg).  Physical Exam  Constitutional:       Appearance: She is normal weight.   HENT:      Head: Normocephalic and atraumatic.      Right Ear: Ear canal normal.      Left Ear: Ear canal normal.   Eyes:      General: No scleral icterus.     Pupils: Pupils are equal, round, and reactive to light.   Cardiovascular:      Rate and Rhythm: Normal rate and regular rhythm.   Pulmonary:      Effort: Pulmonary effort is normal.      Breath sounds: Rhonchi present.   Abdominal:      Palpations: Abdomen is soft.      Tenderness: There is no abdominal tenderness.   Musculoskeletal:      Right lower leg: No edema.      Left lower leg: No edema.   Lymphadenopathy:      Cervical: Cervical adenopathy present.   Skin:     Findings: No lesion.   Neurological:      Mental Status: She is alert and oriented to person, place, and time. Mental status is at baseline.   Psychiatric:         Thought Content: Thought content normal.           Assessment & Plan:   1. Simple chronic bronchitis (HCC) (Primary) levaquin 500mg daily x 10 days  2. Hx of breast cancer surveillance  3. Hypothyroidism, unspecified type on levothyroxine  4. Lumbar spondylosis PT referral  5. Recurrent UTI (urinary tract infection)- Urology evaluation  Other orders  -     levoFLOXacin; Take 1 tablet (500 mg total) by mouth daily for 10 days.  Dispense: 10 tablet; Refill: 1        No follow-ups on file.    Kelsie Erazo MD, 2/2/2024, 1:49 PM

## 2024-02-19 ENCOUNTER — TELEPHONE (OUTPATIENT)
Dept: PHYSICAL THERAPY | Facility: HOSPITAL | Age: 69
End: 2024-02-19

## 2024-02-20 ENCOUNTER — TELEPHONE (OUTPATIENT)
Dept: PHYSICAL THERAPY | Facility: HOSPITAL | Age: 69
End: 2024-02-20

## 2024-02-21 ENCOUNTER — OFFICE VISIT (OUTPATIENT)
Dept: PHYSICAL THERAPY | Age: 69
End: 2024-02-21
Attending: INTERNAL MEDICINE
Payer: MEDICARE

## 2024-02-21 DIAGNOSIS — M47.816 LUMBAR SPONDYLOSIS: Primary | ICD-10-CM

## 2024-02-21 PROCEDURE — 97110 THERAPEUTIC EXERCISES: CPT | Performed by: PHYSICAL THERAPIST

## 2024-02-21 PROCEDURE — 97161 PT EVAL LOW COMPLEX 20 MIN: CPT | Performed by: PHYSICAL THERAPIST

## 2024-02-21 NOTE — PROGRESS NOTES
SPINE EVALUATION:     Diagnosis:   Lumbar spondylosis (M47.816)    Referring Provider: Kacey  Date of Evaluation:    2/21/2024    Precautions:   hx of CA Next MD visit:   none scheduled  Date of Surgery: n/a     PATIENT SUMMARY   Nicole Mason is a 68 year old female who presents to therapy today with complaints of chronic LBP radiating into front of hips starting insidiously ~ 2 years ago, L moreso than R LBP.  She reports that she is a breast cancer survivor, in remission x 5 years. She c/o LBP with transfer out of bed, leaning forward, ascending stairs.  LBP better with walking for longer duration. She denies any LE c/o. Previous interventions: PT (reduction in c/o),  Ibuprofen, denies any other interventions. Pt consulted Dr. Erazo re: LBP: PT referral.    Pt describes pain level current 3-4/10, at best 1/10, at worst 7/10.   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 Pt goals include: resolve LBP, increase activity level.    Past medical history was reviewed with Nicole. Significant findings include:   Chronic obstructive pulmonary disease with acute exacerbation (HCC)3/23/2020Encounter for care related to Ighh-w-Oits90/13/2019Peripheral neuropathy due to chemotherapy (HCC)3/12/2019Encounter for monitoring cardiotoxic drug therapy9/27/2017Dense sebswa61/5/2016History of right breast eujkgc01/4/9638Ojegtbhjdwo4/27/2016. HTN (rx  as needed), hypercholestermia (rx)    Pt reports has recently reduced smoking frequency.     Surgeries: lumpectomy, lipoma removal    Pt denies diplopia, dysarthria, dysphasia, dizziness, drop attacks, bowel/bladder changes, saddle anesthesia, and REBECA LE N/T.    ASSESSMENT  Nicole presents to physical therapy evaluation with primary c/o LBP, L SIJ pain. The results of the objective tests and measures  show: asymetrical standing posture, LBP with L SLUMP, pain/restrictions with lumbar spine AROM, restriction L/R hip ROM, hip/lumbar stabilizer weakness.  Functional deficits include but are not limited to transfer out of bed, leaning forward, ascending stairs.  Signs and symptoms are consistent with diagnosis above, LBP, L sacroiliitis. Pt and PT discussed evaluation findings, pathology, POC and HEP.  Pt voiced understanding and performs HEP correctly without reported pain. Skilled Physical Therapy is medically necessary to address the above impairments and reach functional goals.     OBJECTIVE:   Observation/Posture: Standing: R shoulder slightly depressed vs L, L iliac crest elevated   Neuro Screen: Negative L/R SLUMP but  c/o L LBP with L SLUMP    Lumbar Spine AROM: (* denotes performed with pain)  Flexion: hands to ankles*   Extension: WNL  Sidebending: R/L WNL* c/o L sidebend  Rotation: R 70 deg* ; L 90 deg*    Hip PROM:   Restriction L/R ER  Restriction L/R abd  Restriction/pain with L/R hip flexoin    Accessory motion: Pt denies c/o with lumbar and L SIJ P/A  Palpation: + increased tone/c/o L gluteals at SIJ    Strength: (* denotes performed with pain)  LE   Hip abduction: R 5-/5; L 5-/5  Hip Extension: R 4/5*; L 4/5*   Hip ER: R 4+/5; L 4+/5  Knee Flexion: R 5/5; L 5/5   Knee extension (L3): R 5/5; L 5/5   DF (L4): R 5/5; L 5/5       Gait: pt ambulates on level ground with  R lateral trunk lean .    Today’s Treatment and Response:   Pt education was provided on exam findings, treatment diagnosis, treatment plan, expectations, and prognosis. Pt was also provided recommendations for bodymechanics, sitting posture, computer ergonomics, lumbar support, standing/walking breaks  Patient was instructed in and issued a HEP for: supine LTR, supine across body piriformis stretch, tennis ball STM gluteals, bridge or prone hip ext, prone lumbar ext, prone lumbar ext/on  elbows, MHP    Charges: PT Eval Low Complexity, 1  TE (15 min)      Total Timed Treatment: 15 min     Total Treatment Time: 45 min     Based on clinical rationale and outcome measures, this evaluation involved Low Complexity decision making.  PLAN OF CARE:    Goals: (to be met in 6-8 visits)   Patient to report independence with PT HEP to facilitate self management and to maintain progress made in PT.   Patient to report knowledge of proper bodymechanics, computer ergonomics, sitting posture in order to reduce risk for LBP.   Patient to have L/R hip ext MMT 5/5 in order to negotiate stairs reciprocally without c/o.   Patient to have ~ 80 deg active L/R lumbar rotation without c/o to facilitate bed mobility, transfers.   Patient to report at least 50% improvement in LBP to facilitate ADL.     Frequency / Duration: Patient will be seen for 1-2 x/week or a total of 6-8 visits over a 90 day period. Treatment will include: Gait training, Manual Therapy, Neuromuscular Re-education, Self-Care Home Management, Therapeutic Activities, Therapeutic Exercise, Home Exercise Program instruction, and Modalities to include: Electrical stimulation (unattended), Ultrasound, and MHP, cold pack    Education or treatment limitation: None  Rehab Potential:good    Oswestry Disability Index Score  Score: 26 % (2/21/2024  3:42 PM)      Patient/Family/Caregiver was advised of these findings, precautions, and treatment options and has agreed to actively participate in planning and for this course of care.    Thank you for your referral. Please co-sign or sign and return this letter via fax as soon as possible to [phone]. If you have any questions, please contact me at Dept: [phone]    Sincerely,  Electronically signed by therapist: Rachel Pan, PT    Physician's certification required: Yes  I certify the need for these services furnished under this plan of treatment and while under my care.    X___________________________________________________  Date____________________    Certification From: 2/21/2024  To:5/21/2024

## 2024-02-27 ENCOUNTER — APPOINTMENT (OUTPATIENT)
Dept: PHYSICAL THERAPY | Age: 69
End: 2024-02-27
Attending: INTERNAL MEDICINE
Payer: MEDICARE

## 2024-03-06 ENCOUNTER — OFFICE VISIT (OUTPATIENT)
Dept: PHYSICAL THERAPY | Age: 69
End: 2024-03-06
Attending: INTERNAL MEDICINE
Payer: MEDICARE

## 2024-03-06 PROCEDURE — 97110 THERAPEUTIC EXERCISES: CPT | Performed by: PHYSICAL THERAPIST

## 2024-03-06 PROCEDURE — 97140 MANUAL THERAPY 1/> REGIONS: CPT | Performed by: PHYSICAL THERAPIST

## 2024-03-06 NOTE — PROGRESS NOTES
Diagnosis:    Lumbar spondylosis (M47.816)    Referring Provider: Kacey  Date of Evaluation:    2/21/2024    Precautions:  hx of CA Next MD visit:   none scheduled  Date of Surgery: n/a   Insurance Primary/Secondary: [employer] [facility] / MEDICAID     # Auth Visits:        8 through 5/21/2024    Subjective: Walked ~ 3-4 hours Monday. LBP resolved following, think walking helped.     Pain: 3-4/10 (5-6/10 with computer use)      Objective:   Palpation: + c/o L lumbar and gluteal musculature    Assessment: Pt responded well to long axis lumbar/SIJ traction, reporting reduction in LBP. Provided pt with instruction for how to complete at home. Pt with L SIJ/lumbar region c/o. Reviewed sitting posture/considerations; see below.     Goals:   (to be met in 6-8 visits)   Patient to report independence with PT HEP to facilitate self management and to maintain progress made in PT.   Patient to report knowledge of proper bodymechanics, computer ergonomics, sitting posture in order to reduce risk for LBP.   Patient to have L/R hip ext MMT 5/5 in order to negotiate stairs reciprocally without c/o.   Patient to have ~ 80 deg active L/R lumbar rotation without c/o to facilitate bed mobility, transfers.   Patient to report at least 50% improvement in LBP to facilitate ADL.     Plan: Continue PT 1-2 x weekly for 6-8 visits. F/u on response to lumbar/SIJ traction. Progress lumbar ext, SIJ interventions.   Date: 3/6/2024  TX#: 2/6-8 Date:                 TX#: 3/ Date:                 TX#: 4/ Date:                 TX#: 5/ Date:   Tx#: 6/   There Ex:   HEP review; see below.  Prone on elbows with ext  (pt reports prefers with elbows ext) 5 x 5 reps  Prone hip ext x 3 reps  Supine L/R  across body piriformis stretch 2 x 20 sec each  Supine L/R figure 4 stretch 2 x 20 sec each  Tony pose central/L/R bias 2 x 10 sec each for c/o stiffness/ROM deficit lateral flexion  PT resisted B hip ER <-> IR 5 x 5 sec each  Bridge with pillow for adductor  isometric x 5 reps         Manual:   STM L lumbar region, gluteals  Long axis L/R unilateral traction 4 x 15 sec on: 15 sec off; B LE 4 x 15 sec on: 15 sec off (pt denied any contraindications)         TA:   Standing/walking breaks  Sitting posture including lumbar support, computer ergonomics              HEP: supine LTR, supine across body piriformis stretch, tennis ball STM gluteals, bridge  or prone hip ext, prone lumbar ext, prone lumbar ext/onelbows, MHP; long axis lumbar traction  Charges: 2 TE (25 min), 1 manual (10 min), 0 TA (5 min)       Total Timed Treatment: 40 min  Total Treatment Time: 45 min

## 2024-03-08 ENCOUNTER — OFFICE VISIT (OUTPATIENT)
Dept: PHYSICAL THERAPY | Age: 69
End: 2024-03-08
Attending: INTERNAL MEDICINE
Payer: MEDICARE

## 2024-03-08 PROCEDURE — 97140 MANUAL THERAPY 1/> REGIONS: CPT | Performed by: PHYSICAL THERAPIST

## 2024-03-08 PROCEDURE — 97110 THERAPEUTIC EXERCISES: CPT | Performed by: PHYSICAL THERAPIST

## 2024-03-08 NOTE — PROGRESS NOTES
Diagnosis:    Lumbar spondylosis (M47.816)    Referring Provider: Kacey  Date of Evaluation:    2/21/2024    Precautions:  hx of CA Next MD visit:   none scheduled  Date of Surgery: n/a   Insurance Primary/Secondary: [employer] [facility] / MEDICAID     # Auth Visits:        8 through 5/21/2024    Subjective: Overdid it the other day with exercises and sitting for work.  Felt better this morning. Completed traction at home which helped.  C/o quad tightness.     Pain: 3/10      Objective:   Palpation: + c/o L gluteal musculature, SIJ    L/R SIJ/PSIS seem aligned, no apparent asymmetry    Restriction L > R quad length      Assessment: Pt continues to respond to PT interventions, reporting less LBP. C/o are in prox L SIJ/gluteal region. Practiced variations of quadriceps stretches; prone seems to work best for HEP, may add to HEP.     Goals:   (to be met in 6-8 visits)   Patient to report independence with PT HEP to facilitate self management and to maintain progress made in PT.   Patient to report knowledge of proper bodymechanics, computer ergonomics, sitting posture in order to reduce risk for LBP.   Patient to have L/R hip ext MMT 5/5 in order to negotiate stairs reciprocally without c/o.   Patient to have ~ 80 deg active L/R lumbar rotation without c/o to facilitate bed mobility, transfers.  Patient to report at least 50% improvement in LBP to facilitate ADL.     Plan: Continue PT 1-2 x weekly for 6-8 visits. F/u on quad stretches. Progress lumbar ext, SIJ interventions.   Date: 3/6/2024  TX#: 2/6-8 Date:   3/8/2024              TX#: 3/6-8 Date:                 TX#: 4/ Date:                 TX#: 5/ Date:   Tx#: 6/   There Ex:   HEP review; see below.  Prone on elbows with ext  (pt reports prefers with elbows ext) 5 x 5 reps  Prone hip ext x 3 reps  Supine L/R  across body piriformis stretch 2 x 20 sec each  Supine L/R figure 4 stretch 2 x 20 sec each  Tony pose central/L/R bias 2 x 10 sec each for c/o stiffness/ROM  deficit lateral flexion  PT resisted B hip ER <-> IR 5 x 5 sec each  Bridge with pillow for adductor isometric x 5 reps   There Ex:   Pt c/o quad tightness ; practiced standing, s/l, prone quad stretches x 20 sec each (prone for HEP)  PT resisted B hip ER <-> IR 5 x 5 sec each  Bridge with pillow for adductor isometric x 5 reps  Supine B hip ER with knees flexed: RTB x 10 reps - too easy; GTB x 20 reps (HEP)  Lateral steps with GTB prox to knees 2 x ~ 30 feet   HEP review/progression/handout; see below.      Manual:   STM L lumbar region, gluteals  Long axis L/R unilateral traction 4 x 15 sec on: 15 sec off; B LE 4 x 15 sec on: 15 sec off (pt denied any contraindications)   Manual:   STM L gluteals  Long axis L/R unilateral traction 4 x 15 sec on: 15 sec off; B LE 4 x 15 sec on: 15 sec off (pt denied any contraindications)      TA:   Standing/walking breaks  Sitting posture including lumbar support, computer ergonomics              HEP: supine LTR, supine across body piriformis stretch, tennis ball STM gluteals, bridge or prone hip ext, prone lumbar ext, prone lumbar ext/on elbows, MHP; long axis lumbar traction, B hip ER using RTB, optional prone quad stretch using   Charges: 2 TE (30 min), 1 manual (10 min)   Total Timed Treatment: 40 min  Total Treatment Time: 45 min

## 2024-03-11 ENCOUNTER — OFFICE VISIT (OUTPATIENT)
Dept: PHYSICAL THERAPY | Age: 69
End: 2024-03-11
Attending: INTERNAL MEDICINE
Payer: MEDICARE

## 2024-03-11 PROCEDURE — 97110 THERAPEUTIC EXERCISES: CPT | Performed by: PHYSICAL THERAPIST

## 2024-03-11 PROCEDURE — 97140 MANUAL THERAPY 1/> REGIONS: CPT | Performed by: PHYSICAL THERAPIST

## 2024-03-11 NOTE — PROGRESS NOTES
Diagnosis:    Lumbar spondylosis (M47.816)    Referring Provider: Kacey  Date of Evaluation:    2/21/2024    Precautions:  hx of CA Next MD visit:   none scheduled  Date of Surgery: n/a   Insurance Primary/Secondary: [employer] [facility] / MEDICAID     # Auth Visits:        8 through 5/21/2024    Subjective: C/o LB stiffness today. Deny LE c/o.     Pain: 1.5/10      Objective:   Palpation: + c/o L gluteal musculature, SIJ      Assessment: Pt continues to have  prox L SIJ/gluteal region c/o, but progress noted with PT. Progressed HEP to including WB SIJ/hip stabilization exercises.  Pt tolerated well.     Goals:   (to be met in 6-8 visits)   Patient to report independence with PT HEP to facilitate self management and to maintain progress made in PT.   Patient to report knowledge of proper bodymechanics, computer ergonomics, sitting posture in order to reduce risk for LBP.   Patient to have L/R hip ext MMT 5/5 in order to negotiate stairs reciprocally without c/o.   Patient to have ~ 80 deg active L/R lumbar rotation without c/o to facilitate bed mobility, transfers.  Patient to report at least 50% improvement in LBP to facilitate ADL.     Plan: Continue PT 1-2 x weekly for 6-8 visits. Re assess hip ROM next princess. Progress lumbar ext, SIJ interventions.   Date: 3/6/2024  TX#: 2/6-8 Date:   3/8/2024              TX#: 3/6-8 Date:   3/11/2024              TX#: 4/6-8 Date:                 TX#: 5/ Date:   Tx#: 6/ There Ex:   HEP review; see below.  Prone on elbows with ext  (pt reports prefers with elbows ext) 5 x 5 reps  Prone hip ext x 3 reps  Supine L/R  across body piriformis stretch 2 x 20 sec each  Supine L/R figure 4 stretch 2 x 20 sec each  Tony pose central/L/R bias 2 x 10 sec each for c/o stiffness/ROM deficit lateral flexion  PT resisted B hip ER <-> IR 5 x 5 sec each  Bridge with pillow for adductor isometric x 5 reps   There Ex:   Pt c/o quad tightness ; practiced standing, s/l, prone quad stretches x 20 sec  each (prone for HEP)  PT resisted B hip ER <-> IR 5 x 5 sec each  Bridge with pillow for adductor isometric x 5 reps  Supine B hip ER with knees flexed: RTB x 10 reps - too easy; GTB x 20 reps (HEP)  Lateral steps with GTB prox to knees 2 x ~ 30 feet   HEP review/progression/handout; see below. There Ex:   S/L/ L/R hip ER x 20 reps  S/L L/R quad stretches 2 x 20 sec   PT resisted B hip ER <-> IR 5 x 5 sec each  Bridge with pillow for adductor isometric x 5 reps  Seated L/R figure 4 stretch 3 x 20 sec each  Sit - stand with RTB prox to knees x 10 reps (HEP)  Lateral steps with RTB prox to knees 2 X 40 (HEP)  Monster walks forward/backward 2 x ~ 40 feet, RTB at ankles     Manual:   STM L lumbar region, gluteals  Long axis L/R unilateral traction 4 x 15 sec on: 15 sec off; B LE 4 x 15 sec on: 15 sec off (pt denied any contraindications)   Manual:   STM L gluteals  Long axis L/R unilateral traction 4 x 15 sec on: 15 sec off; B LE 4 x 15 sec on: 15 sec off (pt denied any contraindications) Manual:   STM L gluteals  Long axis L/R unilateral traction 4 x 15 sec on: 15 sec off; B LE 4 x 15 sec on: 15 sec off (pt denied any contraindications)     TA:   Standing/walking breaks  Sitting posture including lumbar support, computer ergonomics              HEP: supine LTR, supine across body piriformis stretch, tennis ball STM gluteals, bridge or prone hip ext, prone lumbar ext, prone lumbar ext/on elbows, MHP; long axis lumbar traction, B hip ER using RTB - progress to mini squat with RTB and/or lateral steps with  RTB, optional prone quad stretch using   Charges: 2 TE (30 min), 1 manual (10 min)   Total Timed Treatment: 40 min  Total Treatment Time: 45 min

## 2024-03-14 ENCOUNTER — OFFICE VISIT (OUTPATIENT)
Dept: PHYSICAL THERAPY | Age: 69
End: 2024-03-14
Attending: INTERNAL MEDICINE
Payer: MEDICARE

## 2024-03-14 PROCEDURE — 97110 THERAPEUTIC EXERCISES: CPT | Performed by: PHYSICAL THERAPIST

## 2024-03-14 PROCEDURE — 97140 MANUAL THERAPY 1/> REGIONS: CPT | Performed by: PHYSICAL THERAPIST

## 2024-03-14 NOTE — PROGRESS NOTES
Diagnosis:    Lumbar spondylosis (M47.816)    Referring Provider: Kacey  Date of Evaluation:    2/21/2024    Precautions:  hx of CA Next MD visit:   none scheduled  Date of Surgery: n/a   Insurance Primary/Secondary: [employer] [facility] / MEDICAID     # Auth Visits:        8 through 5/21/2024    Subjective: Increased c/o yesterday, may be related to the weather. Less gluteal c/o but persist. 50% better since started PT.     Pain: 1/10      Objective:   Palpation: + c/o L gluteal musculature, SIJ  Pt c/o L SIJ pain with ascending stairs, resolved after interventions      Assessment: Pt continues to respond well to PT interventions for L SIJ/gluteal c/o, reporting less pain, improved ADL tolerance. Progressing WB interventions and Hep; pt tolerated well.     Goals:   (to be met in 6-8 visits)   Patient to report independence with PT HEP to facilitate self management and to maintain progress made in PT.   Patient to report knowledge of proper bodymechanics, computer ergonomics, sitting posture in order to reduce risk for LBP.   Patient to have L/R hip ext MMT 5/5 in order to negotiate stairs reciprocally without c/o.   Patient to have ~ 80 deg active L/R lumbar rotation without c/o to facilitate bed mobility, transfers.  Patient to report at least 50% improvement in LBP to facilitate ADL.     Plan: Continue PT 1-2 x weekly for 6-8 visits. Re assess hip ROM next sesison. F/u on HEP progressions. Progress  SIJ interventions.   Date: 3/6/2024  TX#: 2/6-8 Date:   3/8/2024              TX#: 3/6-8 Date:   3/11/2024              TX#: 4/6-8 Date:      3/14/2024           TX#: 5/6-8 Date:   Tx#: 6/ There Ex:   HEP review; see below.  Prone on elbows with ext  (pt reports prefers with elbows ext) 5 x 5 reps  Prone hip ext x 3 reps  Supine L/R  across body piriformis stretch 2 x 20 sec each  Supine L/R figure 4 stretch 2 x 20 sec each  Tony pose central/L/R bias 2 x 10 sec each for c/o stiffness/ROM deficit lateral flexion  PT  resisted B hip ER <-> IR 5 x 5 sec each  Bridge with pillow for adductor isometric x 5 reps   There Ex:   Pt c/o quad tightness ; practiced standing, s/l, prone quad stretches x 20 sec each (prone for HEP)  PT resisted B hip ER <-> IR 5 x 5 sec each  Bridge with pillow for adductor isometric x 5 reps  Supine B hip ER with knees flexed: RTB x 10 reps - too easy; GTB x 20 reps (HEP)  Lateral steps with GTB prox to knees 2 x ~ 30 feet   HEP review/progression/handout; see below. There Ex:   S/L/ L/R hip ER x 20 reps  S/L L/R quad stretches 2 x 20 sec   PT resisted B hip ER <-> IR 5 x 5 sec each  Bridge with pillow for adductor isometric x 5 reps  Seated L/R figure 4 stretch 3 x 20 sec each  Sit - stand with RTB prox to knees x 10 reps (HEP)  Lateral steps with RTB prox to knees 2 X 40 (HEP)  Monster walks forward/backward 2 x ~ 40 feet, RTB at ankles There Ex:   Pt inquired re: tree pose, advised okay as long as does not provoke symptoms.   L/R SLS with contrateral hip flex, abd, ext x ~ 8 reps each (HEP)  S/L L/R hip circles x ~ 8 reps each clockwise/counterclockwise (HEP)  Pt resisted B hip Er <->IR 5 x 5 sec each   Bridge with pillow for adductor isometric x 5 reps  Supine L/R figure 4 and across body piriformis stretch x 20 sec each (Reviewed long sitting version)  Squat with RTB prox to knees x 10 reps (HEP)  Reformer: B squats x 10 reps R,B,G,Y; L/R unilateral x ~ 15 reps each R,B,G,Y  HEP revision/handout; see below.     Manual:   STM L lumbar region, gluteals  Long axis L/R unilateral traction 4 x 15 sec on: 15 sec off; B LE 4 x 15 sec on: 15 sec off (pt denied any contraindications)   Manual:   STM L gluteals  Long axis L/R unilateral traction 4 x 15 sec on: 15 sec off; B LE 4 x 15 sec on: 15 sec off (pt denied any contraindications) Manual:   STM L gluteals  Long axis L/R unilateral traction 4 x 15 sec on: 15 sec off; B LE 4 x 15 sec on: 15 sec off (pt denied any contraindications) Manual:   STM L gluteals,  ITB with/without foam roller  Long axis L/R unilateral traction 4 x 15 sec on: 15 sec off; B LE 4 x 15 sec on: 15 sec off     TA:   Standing/walking breaks  Sitting posture including lumbar support, computer ergonomics   TA:   Pt education re: continuation of PT HEP 3-4 x weekly to maintain progress after discharge from PT; strengthening/stretching, equal LE WB in effort to reduce/manage R SIJ c/o           HEP: supine LTR - optional, supine across body piriformis stretch, tennis ball STM gluteals, bridge with pillow for adductor isoemtric,prone lumbar ext/on elbows, MHP; long axis lumbar traction, mini squat with RTB and/or lateral steps with  RTB and/or s/l hip circles, SLS with contralateral hip flex/abd/ext, optional prone quad stretch using. Pt reports completing tree post.   Charges: 2 TE (30 min), 1 manual (10 min) , 0 TA (5 min)  Total Timed Treatment: 45 min  Total Treatment Time: 50 min

## 2024-03-18 ENCOUNTER — APPOINTMENT (OUTPATIENT)
Dept: PHYSICAL THERAPY | Age: 69
End: 2024-03-18
Attending: INTERNAL MEDICINE
Payer: MEDICARE

## 2024-03-20 ENCOUNTER — OFFICE VISIT (OUTPATIENT)
Dept: PHYSICAL THERAPY | Age: 69
End: 2024-03-20
Attending: INTERNAL MEDICINE
Payer: MEDICARE

## 2024-03-20 PROCEDURE — 97140 MANUAL THERAPY 1/> REGIONS: CPT | Performed by: PHYSICAL THERAPIST

## 2024-03-20 PROCEDURE — 97110 THERAPEUTIC EXERCISES: CPT | Performed by: PHYSICAL THERAPIST

## 2024-03-20 NOTE — PROGRESS NOTES
Diagnosis:    Lumbar spondylosis (M47.816)    Referring Provider: Kacey  Date of Evaluation:    2/21/2024    Precautions:  hx of CA Next MD visit:   none scheduled  Date of Surgery: n/a   Insurance Primary/Secondary: [employer] [facility] / MEDICAID     # Auth Visits:        8 through 5/21/2024    Subjective: ~45-50% better since started PT. Feel better when I walk, exercise.     Pain: 2-3/10      Objective:   Palpation: + c/o prox L gluteal musculature, SIJ    Lumbar Spine AROM:   Flexion: hands to toes  Rotation: L/R each WNL  Sidebend: L/R each WNL*c/o      Assessment: Pt continues to respond well to PT interventions for L SIJ/gluteal c/o, reporting less pain, improved ADL tolerance. Pt tolerating WB progression well without c/o.     Goals:   (to be met in 6-8 visits)   Patient to report independence with PT HEP to facilitate self management and to maintain progress made in PT.   Patient to report knowledge of proper bodymechanics, computer ergonomics, sitting posture in order to reduce risk for LBP.   Patient to have L/R hip ext MMT 5/5 in order to negotiate stairs reciprocally without c/o.   Patient to have ~ 80 deg active L/R lumbar rotation without c/o to facilitate bed mobility, transfers.  Patient to report at least 50% improvement in LBP to facilitate ADL.     Plan: Continue PT 1-2 x weekly for 8 visits. POC in 1-2 sessions. Re assess hip ROM next princess.   Date:   3/8/2024              TX#: 3/6-8 Date:   3/11/2024              TX#: 4/6-8 Date:      3/14/2024           TX#: 5/6-8 Date: 3/20/2024  Tx#: 6/8   There Ex:   Pt c/o quad tightness ; practiced standing, s/l, prone quad stretches x 20 sec each (prone for HEP)  PT resisted B hip ER <-> IR 5 x 5 sec each  Bridge with pillow for adductor isometric x 5 reps  Supine B hip ER with knees flexed: RTB x 10 reps - too easy; GTB x 20 reps (HEP)  Lateral steps with GTB prox to knees 2 x ~ 30 feet   HEP review/progression/handout; see below. There Ex:   S/L/ L/R hip ER  x 20 reps  S/L L/R quad stretches 2 x 20 sec   PT resisted B hip ER <-> IR 5 x 5 sec each  Bridge with pillow for adductor isometric x 5 reps  Seated L/R figure 4 stretch 3 x 20 sec each  Sit - stand with RTB prox to knees x 10 reps (HEP)  Lateral steps with RTB prox to knees 2 X 40 (HEP)  Monster walks forward/backward 2 x ~ 40 feet, RTB at ankles There Ex:   Pt inquired re: tree pose, advised okay as long as does not provoke symptoms.   L/R SLS with contrateral hip flex, abd, ext x ~ 8 reps each (HEP)  S/L L/R hip circles x ~ 8 reps each clockwise/counterclockwise (HEP)  Pt resisted B hip Er <->IR 5 x 5 sec each   Bridge with pillow for adductor isometric x 5 reps  Supine L/R figure 4 and across body piriformis stretch x 20 sec each (Reviewed long sitting version)  Squat with RTB prox to knees x 10 reps (HEP)  Reformer: B squats x 10 reps R,B,G,Y; L/R unilateral x ~ 15 reps each R,B,G,Y  HEP revision/handout; see below.  There Ex:   S/L L/R hip circles x ~ 8 reps each clockwise/counterclockwise (HEP)  Pt resisted B hip Er <->IR 5 x 5 sec each   Bridge with pillow for adductor isometric x 5 reps  Long sitting L/R gluteal stretch x 20 sec each   Sit - stand with RTB prox to knees x 10 reps  Lateral steps with RTB prox to knees 2 x ~ 40 feet  Monster walk with RTB prox to knees 3 x ~ 16 feet  Reformer: B squats x 10 reps R,B,G,Y; L/R unilateral x ~ 15 reps each R,B,G,Y - progress next session  Seated 3 way lumbar stretch using ball - next session   Manual:   STM L gluteals  Long axis L/R unilateral traction 4 x 15 sec on: 15 sec off; B LE 4 x 15 sec on: 15 sec off (pt denied any contraindications) Manual:   STM L gluteals  Long axis L/R unilateral traction 4 x 15 sec on: 15 sec off; B LE 4 x 15 sec on: 15 sec off (pt denied any contraindications) Manual:   STM L gluteals, ITB with/without foam roller  Long axis L/R unilateral traction 4 x 15 sec on: 15 sec off; B LE 4 x 15 sec on: 15 sec off  Manual:   STM L  gluteals, ITB with/without foam roller; not completed R, pt denies c/o  Grade II central sacral P/A  Long axis L/R unilateral traction 4 x 15 sec on: 15 sec off; B LE 4 x 15 sec on: 15 sec off      TA:   Pt education re: continuation of PT HEP 3-4 x weekly to maintain progress after discharge from PT; strengthening/stretching, equal LE WB in effort to reduce/manage R SIJ c/o          HEP: supine LTR - optional, supine across body piriformis stretch, tennis ball STM gluteals, bridge with pillow for adductor isoemtric,prone lumbar ext/on elbows, MHP; long axis lumbar traction, mini squat with RTB and/or lateral steps with  RTB and/or s/l hip circles, SLS with contralateral hip flex/abd/ext, optional prone quad stretch using. Pt reports completing tree pose.   Charges: 2 TE (30 min), 1 manual (10 min)  Total Timed Treatment: 40 min  Total Treatment Time: 45 min

## 2024-03-27 ENCOUNTER — OFFICE VISIT (OUTPATIENT)
Dept: PHYSICAL THERAPY | Age: 69
End: 2024-03-27
Attending: INTERNAL MEDICINE
Payer: MEDICARE

## 2024-03-27 PROCEDURE — 97110 THERAPEUTIC EXERCISES: CPT | Performed by: PHYSICAL THERAPIST

## 2024-03-27 PROCEDURE — 97140 MANUAL THERAPY 1/> REGIONS: CPT | Performed by: PHYSICAL THERAPIST

## 2024-03-27 NOTE — PROGRESS NOTES
Diagnosis:    Lumbar spondylosis (M47.816)    Referring Provider: Kacey  Date of Evaluation:    2/21/2024    Precautions:  hx of CA Next MD visit:   4/6/2024  Date of Surgery: n/a   Insurance Primary/Secondary: [employer] [facility] / MEDICAID     # Auth Visits:        8 through 5/21/2024    Subjective:  Sitting irritates my LB. Feel better when I exercise.  Return to PT mid 4/2024; want to see how I do with my HEP.     Pain: 2/10      Objective:   Palpation: + c/o prox L gluteal musculature, SIJ. L SIJ possibly posterior rotated vs R.     Lumbar Spine AROM:   Flexion: hands to toes* little increase LBP  Extension: WNL* mild increased LBP  Rotation: L/R each WNL  Sidebend: L/R each WNL*c/o      Assessment: Pt continues to respond well to PT interventions for L SIJ/gluteal c/o, reporting less pain, improved ADL tolerance, able to negotiate stairs following without c/o. Pt tolerating WB progression well without c/o.  Reviewed sitting considerations; see below. Pt responded well to SIJ muscle energy technique, reporting less c/o following. Advised may complete at home as well tolerated/helpful. Hep reviewed.      Goals:   (to be met in 6-8 visits)   Patient to report independence with PT HEP to facilitate self management and to maintain progress made in PT.   Patient to report knowledge of proper bodymechanics, computer ergonomics, sitting posture in order to reduce risk for LBP.   Patient to have L/R hip ext MMT 5/5 in order to negotiate stairs reciprocally without c/o.   Patient to have ~ 80 deg active L/R lumbar rotation without c/o to facilitate bed mobility, transfers.  Patient to report at least 50% improvement in LBP to facilitate ADL.     Plan: Continue PT 1-2 x weekly for 8 visits. POC next session.  Consider referral back to physician if c/o persist.  Pt to return to PT mid 4/2024, prefers to assess how she does with HEP.  Consider: Re assess hip ROM next princess.   Date:   3/11/2024              TX#: 4/6-8 Date:       3/14/2024           TX#: 5/6-8 Date: 3/20/2024  Tx#: 6/8 Date: 3/27/2024  Tx #: 7/8   There Ex:   S/L/ L/R hip ER x 20 reps  S/L L/R quad stretches 2 x 20 sec   PT resisted B hip ER <-> IR 5 x 5 sec each  Bridge with pillow for adductor isometric x 5 reps  Seated L/R figure 4 stretch 3 x 20 sec each  Sit - stand with RTB prox to knees x 10 reps (HEP)  Lateral steps with RTB prox to knees 2 X 40 (HEP)  Monster walks forward/backward 2 x ~ 40 feet, RTB at ankles There Ex:   Pt inquired re: tree pose, advised okay as long as does not provoke symptoms.   L/R SLS with contrateral hip flex, abd, ext x ~ 8 reps each (HEP)  S/L L/R hip circles x ~ 8 reps each clockwise/counterclockwise (HEP)  Pt resisted B hip Er <->IR 5 x 5 sec each   Bridge with pillow for adductor isometric x 5 reps  Supine L/R figure 4 and across body piriformis stretch x 20 sec each (Reviewed long sitting version)  Squat with RTB prox to knees x 10 reps (HEP)  Reformer: B squats x 10 reps R,B,G,Y; L/R unilateral x ~ 15 reps each R,B,G,Y  HEP revision/handout; see below.  There Ex:   S/L L/R hip circles x ~ 8 reps each clockwise/counterclockwise (HEP)  Pt resisted B hip Er <->IR 5 x 5 sec each   Bridge with pillow for adductor isometric x 5 reps  Long sitting L/R gluteal stretch x 20 sec each   Sit - stand with RTB prox to knees x 10 reps  Lateral steps with RTB prox to knees 2 x ~ 40 feet  Monster walk with RTB prox to knees 3 x ~ 16 feet  Reformer: B squats x 10 reps R,B,G,Y; L/R unilateral x ~ 15 reps each R,B,G,Y - progress next session  Seated 3 way lumbar stretch using ball - next session There Ex:    SIJ Muscle energy: hip flexors L: hamstrings R 5 x 5 sec each (HEP)  Bridge with pillow for adductor isometric x 5 reps  Sit - stand with RTB prox to knees x 10 reps  Lateral steps with RTB prox to knees  x ~ 40 feet  Monster walk with RTB prox to knees x ~ 40  feet  Reformer: B squats x 15 reps all springs ; L/R unilateral x ~ 20 reps each  May  consider lateral step up progression for HEP   Manual:   STM L gluteals  Long axis L/R unilateral traction 4 x 15 sec on: 15 sec off; B LE 4 x 15 sec on: 15 sec off (pt denied any contraindications) Manual:   STM L gluteals, ITB with/without foam roller  Long axis L/R unilateral traction 4 x 15 sec on: 15 sec off; B LE 4 x 15 sec on: 15 sec off  Manual:   STM L gluteals, ITB with/without foam roller; not completed R, pt denies c/o  Grade II central sacral P/A  Long axis L/R unilateral traction 4 x 15 sec on: 15 sec off; B LE 4 x 15 sec on: 15 sec off  Manual:   STM/myofascial release L gluteals   Grade II central sacral P/A  Long axis L/R unilateral traction 4 x 15 sec on: 15 sec off; B LE 4 x 15 sec on: 15 sec off     TA:   Pt education re: continuation of PT HEP 3-4 x weekly to maintain progress after discharge from PT; strengthening/stretching, equal LE WB in effort to reduce/manage R SIJ c/o  TA:   Sitting posture: lumbar support, limit sitting time, avoid crossing LE         HEP: supine LTR - optional, supine across body piriformis stretch, tennis ball STM gluteals, bridge with pillow for adductor isoemtric, prone lumbar ext/on elbows, MHP; long axis lumbar traction, mini squat with RTB and/or lateral steps with  RTB and/or s/l hip circles, SLS with contralateral hip flex/abd/ext, optional prone quad stretch using. Pt reports completing tree pose.   Charges: 2 TE (30 min), 1 manual (10 min), 0 TA (2 min) Total Timed Treatment: 42 min  Total Treatment Time: 45 min

## 2024-04-06 ENCOUNTER — OFFICE VISIT (OUTPATIENT)
Dept: INTERNAL MEDICINE CLINIC | Facility: CLINIC | Age: 69
End: 2024-04-06
Payer: COMMERCIAL

## 2024-04-06 VITALS
HEIGHT: 66 IN | HEART RATE: 63 BPM | OXYGEN SATURATION: 97 % | BODY MASS INDEX: 24.59 KG/M2 | SYSTOLIC BLOOD PRESSURE: 130 MMHG | WEIGHT: 153 LBS | DIASTOLIC BLOOD PRESSURE: 70 MMHG

## 2024-04-06 DIAGNOSIS — M47.816 LUMBAR SPONDYLOSIS: ICD-10-CM

## 2024-04-06 DIAGNOSIS — Z85.3 HX OF BREAST CANCER: ICD-10-CM

## 2024-04-06 DIAGNOSIS — E78.2 MIXED HYPERLIPIDEMIA: Primary | ICD-10-CM

## 2024-04-06 DIAGNOSIS — E03.9 ACQUIRED HYPOTHYROIDISM: ICD-10-CM

## 2024-04-06 PROCEDURE — 99214 OFFICE O/P EST MOD 30 MIN: CPT | Performed by: INTERNAL MEDICINE

## 2024-04-06 NOTE — PROGRESS NOTES
Subjective:   Nciole Mason is a 68 year old female who presents for Follow - Up     Patient here for a fu on lumbar spondylosis after  PT.   Has benefited from therapy but feels she could use more work.  Is trying to quit smoking - cut down to 5-7 cigarettes a day. Has labile bp readings- On losartan.  History/Other:    Chief Complaint Reviewed and Verified  No Further Nursing Notes to   Review  Problem List Reviewed         Tobacco:  She currently smokes tobacco.  Social History    Tobacco Use      Smoking status: Some Days        Packs/day: 0.25        Years: 30.00        Additional pack years: 0.00        Total pack years: 7.50        Types: Cigarettes      Smokeless tobacco: Never      Tobacco comments: plan to quit     Tobacco Cessation Documentation (Smoking and Smokeless included):  I had an in depth therapy session with Nicole Mason about her tobacco use risks and options using the USPSTF's Five A's approach:    Ask: Nicole is using tobacco products.  Assess: We asked about/assessed behavioral health risk and factors affecting choice of behavior change goals/methods.  Specifically I asked about readiness to quit tobacco.  Advise: We gave clear, specific, and personalized behavior change advice, including information about personal health harms and benefits. Specifically, she was told that Quitting tobacco is one of the best things she can do for her health. I strongly encouraged her to quit.      Agree: We collaboratively selected appropriate treatment goals and methods based on the patient’s interest in and willingness to change the behavior.   Assist: We used behavior change techniques (self-help and/or counseling), to aid Nicole in achieving agreed-upon goals by acquiring the skills, confidence, and social/environmental supports for behavior change, supplemented with adjunctive medical treatments when appropriate. Additionally, national quitting tobacco aides were discussed.    Arrange: Follow up at next visit- see specific follow up below.    Time Counseled: 5 minutes       Current Outpatient Medications   Medication Sig Dispense Refill    clobetasol 0.05 % External Cream Apply 1 Application topically 2 (two) times daily. 30 g 2    albuterol 108 (90 Base) MCG/ACT Inhalation Aero Soln Inhale 2 puffs into the lungs every 6 (six) hours as needed for Wheezing. 1 each 3    losartan 25 MG Oral Tab Take 1 tablet (25 mg total) by mouth daily. 90 tablet 3    levothyroxine 100 MCG Oral Tab Take 1 tablet (100 mcg total) by mouth daily. 90 tablet 3    rosuvastatin 20 MG Oral Tab Take 1 tablet (20 mg total) by mouth nightly. 90 tablet 3    gabapentin 300 MG Oral Cap TAKE 1 CAPSULE BY MOUTH TWO TIMES DAILY 90 capsule 3    Mometasone Furo-Formoterol Fum (DULERA) 100-5 MCG/ACT Inhalation Aerosol Inhale 2 puffs into the lungs 2 (two) times a day. 1 each 11    Vitamin D3 1000 UNITS Oral Tab Take 1 tablet (1,000 Units total) by mouth daily.      Coenzyme Q10 (COQ10 OR) Take by mouth daily.        LUTEIN-ZEAXANTHIN OR Take by mouth daily.             Review of Systems:  Review of Systems   Respiratory:  Negative for cough and shortness of breath.    Musculoskeletal:  Positive for back pain (lumbar).   Neurological:  Positive for numbness (fingers of hands).         Objective:   /70   Pulse 63   Ht 5' 6\" (1.676 m)   Wt 153 lb (69.4 kg)   SpO2 97%   BMI 24.69 kg/m²  Estimated body mass index is 24.69 kg/m² as calculated from the following:    Height as of this encounter: 5' 6\" (1.676 m).    Weight as of this encounter: 153 lb (69.4 kg).  Physical Exam  Constitutional:       Appearance: She is normal weight.   HENT:      Head: Normocephalic and atraumatic.      Right Ear: Ear canal normal.      Left Ear: Ear canal normal.   Eyes:      General: No scleral icterus.     Pupils: Pupils are equal, round, and reactive to light.   Neck:      Vascular: No carotid bruit.   Cardiovascular:      Rate and  Rhythm: Normal rate and regular rhythm.   Pulmonary:      Effort: Pulmonary effort is normal.      Breath sounds: No wheezing.   Abdominal:      Palpations: Abdomen is soft.      Tenderness: There is no abdominal tenderness.   Musculoskeletal:         General: Tenderness (lumbar tenderness) present.      Cervical back: Neck supple.      Right lower leg: No edema.      Left lower leg: No edema.   Lymphadenopathy:      Cervical: No cervical adenopathy.   Skin:     Findings: No lesion.   Neurological:      Mental Status: She is alert. Mental status is at baseline.   Psychiatric:         Thought Content: Thought content normal.           Assessment & Plan:   1. Mixed hyperlipidemia (Primary) on crestor 10mg  -     Comp Metabolic Panel (14)  -     Lipid Panel  -     CBC With Differential With Platelet  2. Acquired hypothyroidism on levothyroxine  -     TSH W Reflex To Free T4  3. Lumbar spondylosis continue PT x 8 more sessions  -     Physical Therapy Referral - [facility]    4. Hx of breast cancer  - mammogram in May and fu with dr. Delgado      No follow-ups on file.    Kelsie Erazo MD, 4/6/2024, 11:09 AM

## 2024-04-09 LAB
ABSOLUTE BASOPHILS: 61 CELLS/UL (ref 0–200)
ABSOLUTE EOSINOPHILS: 82 CELLS/UL (ref 15–500)
ABSOLUTE LYMPHOCYTES: 1275 CELLS/UL (ref 850–3900)
ABSOLUTE MONOCYTES: 525 CELLS/UL (ref 200–950)
ABSOLUTE NEUTROPHILS: 3157 CELLS/UL (ref 1500–7800)
ALBUMIN/GLOBULIN RATIO: 2.1 (CALC) (ref 1–2.5)
ALBUMIN: 4.4 G/DL (ref 3.6–5.1)
ALKALINE PHOSPHATASE: 55 U/L (ref 37–153)
ALT: 11 U/L (ref 6–29)
AST: 17 U/L (ref 10–35)
BASOPHILS: 1.2 %
BILIRUBIN, TOTAL: 0.5 MG/DL (ref 0.2–1.2)
BUN: 12 MG/DL (ref 7–25)
CALCIUM: 9.6 MG/DL (ref 8.6–10.4)
CARBON DIOXIDE: 26 MMOL/L (ref 20–32)
CHLORIDE: 99 MMOL/L (ref 98–110)
CHOL/HDLC RATIO: 2.3 (CALC)
CHOLESTEROL, TOTAL: 191 MG/DL
CREATININE: 0.65 MG/DL (ref 0.5–1.05)
EGFR: 97 ML/MIN/1.73M2
EOSINOPHILS: 1.6 %
GLOBULIN: 2.1 G/DL (CALC) (ref 1.9–3.7)
GLUCOSE: 111 MG/DL (ref 65–99)
HDL CHOLESTEROL: 82 MG/DL
HEMATOCRIT: 43.2 % (ref 35–45)
HEMOGLOBIN: 14 G/DL (ref 11.7–15.5)
LDL-CHOLESTEROL: 90 MG/DL (CALC)
LYMPHOCYTES: 25 %
MCH: 30.8 PG (ref 27–33)
MCHC: 32.4 G/DL (ref 32–36)
MCV: 94.9 FL (ref 80–100)
MONOCYTES: 10.3 %
MPV: 10.3 FL (ref 7.5–12.5)
NEUTROPHILS: 61.9 %
NON-HDL CHOLESTEROL: 109 MG/DL (CALC)
PLATELET COUNT: 262 THOUSAND/UL (ref 140–400)
POTASSIUM: 4.7 MMOL/L (ref 3.5–5.3)
PROTEIN, TOTAL: 6.5 G/DL (ref 6.1–8.1)
RDW: 14.8 % (ref 11–15)
RED BLOOD CELL COUNT: 4.55 MILLION/UL (ref 3.8–5.1)
SODIUM: 134 MMOL/L (ref 135–146)
T4, FREE: 1.3 NG/DL (ref 0.8–1.8)
TRIGLYCERIDES: 97 MG/DL
TSH W/REFLEX TO FT4: 5.94 MIU/L (ref 0.4–4.5)
WHITE BLOOD CELL COUNT: 5.1 THOUSAND/UL (ref 3.8–10.8)

## 2024-04-17 ENCOUNTER — OFFICE VISIT (OUTPATIENT)
Dept: PHYSICAL THERAPY | Age: 69
End: 2024-04-17
Attending: INTERNAL MEDICINE
Payer: MEDICARE

## 2024-04-17 PROCEDURE — 97140 MANUAL THERAPY 1/> REGIONS: CPT | Performed by: PHYSICAL THERAPIST

## 2024-04-17 PROCEDURE — 97110 THERAPEUTIC EXERCISES: CPT | Performed by: PHYSICAL THERAPIST

## 2024-04-17 NOTE — PROGRESS NOTES
ProgressSummary  Pt has attended 8 visits in Physical Therapy.    Diagnosis:    Lumbar spondylosis (M47.816)    Referring Provider: Kacey  Date of Evaluation:    2/21/2024    Precautions:  hx of CA Next MD visit:   4/6/2024  Date of Surgery: n/a   Insurance Primary/Secondary: [employer] [facility] / MEDICAID     # Auth Visits:        8 through 5/21/2024    Subjective:   When I complete HEP, I feel pretty good.  Did not complete recently due to UTI, taking antibiotics.  Had appointment with PCP who ordered more PT sessions. I would like to continue with  a few more sessions.   Pain: 2/10    Objective:   Palpation: + c/o prox L gluteal musculature, SIJ.    Observation/Posture: Standing: R shoulder slightly depressed vs L, L iliac crest elevated     Lumbar Spine AROM: (* denotes performed with pain)  Flexion: hands to feet    Extension: WNL  Sidebending: R/L WNL *c/o slight pain  Rotation: R 90 deg ; L 90 deg- co stiffness  Hip PROM:   Mild restriction L/R ER  Mild restriction L/R abd  Mild restriction L/R hip flexoin    Accessory motion: Pt denies c/o with lumbar and L SIJ P/A    Strength: (* denotes performed with pain)  LE   Hip abduction: R 5-/5; L 5-/5  Hip Extension: R 4/5*; L 4/5*   Hip ER: R 4+/5; L 4+/5  Knee Flexion: R 5/5; L 5/5   Knee extension (L3): R 5/5; L 5/5   DF (L4): R 5/5; L 5/5     Gait: pt ambulates on level ground with R lateral trunk lean.    Assessment: Pt returns to PT after last attending 3/27/2024.  She reports progress overall, most notable with HEP compliance.  She has demonstrated improvements in lumbar spine AROM, LE MMT, ADL tolerance, self management, but c/o not fully resolved. She may benefit from a few additional PT sessions to address residual deficits/c/o related to lumbar/SIJ regions.    Goals:   (to be met in 6-8 visits)   Patient to report independence with PT HEP to facilitate self management and to maintain progress made in PT.   Patient to report knowledge of proper bodymechanics,  computer ergonomics, sitting posture in order to reduce risk for LBP.   Patient to have L/R hip ext MMT 5/5 in order to negotiate stairs reciprocally without c/o.   Patient to have ~ 80 deg active L/R lumbar rotation without c/o to facilitate bed mobility, transfers.  Patient to report at least 50% improvement in LBP to facilitate ADL.     Plan: Continue PT 1-2 x weekly for 9-12 PT visits. Progress SIJ interventions, hip/lumbar stabilization, pt education.   Date:      3/14/2024           TX#: 5/6-8 Date: 3/20/2024  Tx#: 6/8 Date: 3/27/2024  Tx #: 7/8 Date: 4/17/2024  Tx #: 8/12 (8/8 auth)   There Ex:   Pt inquired re: tree pose, advised okay as long as does not provoke symptoms.   L/R SLS with contrateral hip flex, abd, ext x ~ 8 reps each (HEP)  S/L L/R hip circles x ~ 8 reps each clockwise/counterclockwise (HEP)  Pt resisted B hip Er <->IR 5 x 5 sec each   Bridge with pillow for adductor isometric x 5 reps  Supine L/R figure 4 and across body piriformis stretch x 20 sec each (Reviewed long sitting version)  Squat with RTB prox to knees x 10 reps (HEP)  Reformer: B squats x 10 reps R,B,G,Y; L/R unilateral x ~ 15 reps each R,B,G,Y  HEP revision/handout; see below.  There Ex:   S/L L/R hip circles x ~ 8 reps each clockwise/counterclockwise (HEP)  Pt resisted B hip Er <->IR 5 x 5 sec each   Bridge with pillow for adductor isometric x 5 reps  Long sitting L/R gluteal stretch x 20 sec each   Sit - stand with RTB prox to knees x 10 reps  Lateral steps with RTB prox to knees 2 x ~ 40 feet  Monster walk with RTB prox to knees 3 x ~ 16 feet  Reformer: B squats x 10 reps R,B,G,Y; L/R unilateral x ~ 15 reps each R,B,G,Y - progress next session  Seated 3 way lumbar stretch using ball - next session There Ex:    SIJ Muscle energy: hip flexors L: hamstrings R 5 x 5 sec each (HEP)  Bridge with pillow for adductor isometric x 5 reps  Sit - stand with RTB prox to knees x 10 reps  Lateral steps with RTB prox to knees  x ~ 40  feet  Monster walk with RTB prox to knees x ~ 40  feet  Reformer: B squats x 15 reps all springs ; L/R unilateral x ~ 20 reps each  May consider lateral step up progression for HEP There Ex  HEP review; see below.  Per pt request, reviewed/completed:   SIJ Muscle energy: hip flexors L: hamstrings R 5 x 5 sec each (HEP, as needed/helpful)  PT resisted B hip ER - IR 5 x 5 sec each  Bridge x 10 reps  Reformer: B squats x 15 reps all springs ; L/R unilateral x ~ 20 reps each   Manual:   STM L gluteals, ITB with/without foam roller  Long axis L/R unilateral traction 4 x 15 sec on: 15 sec off; B LE 4 x 15 sec on: 15 sec off  Manual:   STM L gluteals, ITB with/without foam roller; not completed R, pt denies c/o  Grade II central sacral P/A  Long axis L/R unilateral traction 4 x 15 sec on: 15 sec off; B LE 4 x 15 sec on: 15 sec off  Manual:   STM/myofascial release L gluteals   Grade II central sacral P/A  Long axis L/R unilateral traction 4 x 15 sec on: 15 sec off; B LE 4 x 15 sec on: 15 sec off  Manual:   STM/myofascial release L gluteals   Grade II central sacral P/A  Long axis L/R unilateral traction 4 x 15 sec on: 15 sec off; B LE 4 x 15 sec on: 15 sec off    TA:   Pt education re: continuation of PT HEP 3-4 x weekly to maintain progress after discharge from PT; strengthening/stretching, equal LE WB in effort to reduce/manage R SIJ c/o  TA:   Sitting posture: lumbar support, limit sitting time, avoid crossing LE TA:   Bodymechanics for lifting and rotation         HEP: supine LTR,  supine across body piriformis stretch, tennis ball STM gluteals, bridge with pillow for adductor isoemtric, prone lumbar ext/on elbows, MHP; long axis lumbar traction, mini squat with RTB and/or lateral steps with  RTB and/or s/l hip circles, SLS with contralateral hip flex/abd/ext.  Pt reports completing tree pose. SIJ muscle energy as needed  Charges: 2 TE (30 min), 1 manual (10 min), 0 TA (2 min) Total Timed Treatment: 42 min  Total  Treatment Time: 45 min    Oswestry Disability Index Score  Score: 26 % (2/21/2024  3:42 PM)    Post Oswestry Disability Index Score  Post Score: 16 % (4/17/2024 11:12 AM)    10 % improvement    Plan: Continue skilled Physical Therapy 1-2 x/week or a total of 12-14 visits over a 90 day period. Treatment will include: there ex, there activities, manual therapy, NM Re-ed, and modalities as needed.        Patient/Family/Caregiver was advised of these findings, precautions, and treatment options and has agreed to actively participate in planning and for this course of care.    Thank you for your referral. If you have any questions, please contact me at Dept: [phone].    Sincerely,  Electronically signed by therapist: Rachel Pan PT     Physician's certification required:  Yes  Please co-sign or sign and return this letter via fax as soon as possible to [phone].   I certify the need for these services furnished under this plan of treatment and while under my care.    X___________________________________________________ Date____________________    Certification From: 4/17/2024  To:7/16/2024

## 2024-04-24 ENCOUNTER — TELEPHONE (OUTPATIENT)
Dept: PHYSICAL THERAPY | Age: 69
End: 2024-04-24

## 2024-04-30 ENCOUNTER — OFFICE VISIT (OUTPATIENT)
Dept: PHYSICAL THERAPY | Age: 69
End: 2024-04-30
Attending: INTERNAL MEDICINE
Payer: MEDICARE

## 2024-04-30 PROCEDURE — 97140 MANUAL THERAPY 1/> REGIONS: CPT | Performed by: PHYSICAL THERAPIST

## 2024-04-30 PROCEDURE — 97530 THERAPEUTIC ACTIVITIES: CPT | Performed by: PHYSICAL THERAPIST

## 2024-04-30 PROCEDURE — 97110 THERAPEUTIC EXERCISES: CPT | Performed by: PHYSICAL THERAPIST

## 2024-04-30 NOTE — PROGRESS NOTES
Diagnosis:    Lumbar spondylosis (M47.816)    Referring Provider: Kacey  Date of Evaluation:    2/21/2024    Precautions:  hx of CA Next MD visit:   4/6/2024  Date of Surgery: n/a   Insurance Primary/Secondary: [employer] [facility] / MEDICAID     # Auth Visits:        12 through 7/2024    Subjective:   LB is pretty good today.  C/o lateral L hip pain today.  Feel better when I walk more and complete HEP; increased symptoms when I don't. C/o upper trap tightness.  Pain: 1/10    Objective:   Palpation: + c/o prox L gluteal musculature, SIJ.  Hip PROM:   Mild restriction L/R ER  Mild restriction L/R abd  Mild restriction L/R hip flexoin    Gait: pt ambulates on level ground with R lateral trunk lean.    Assessment: Pt reports less symptoms/feels better with HEP compliance.  Pt tolerated session well, mild continued c/o related to lumbar/SIJ regions. Briefly reviewed considerations for c/o neck tightness due to pt inquiry (see below); advised physician consultation if c/o persist.     Goals:   (to be met in 6-8 visits)   Patient to report independence with PT HEP to facilitate self management and to maintain progress made in PT.   Patient to report knowledge of proper bodymechanics, computer ergonomics, sitting posture in order to reduce risk for LBP.   Patient to have L/R hip ext MMT 5/5 in order to negotiate stairs reciprocally without c/o.   Patient to have ~ 80 deg active L/R lumbar rotation without c/o to facilitate bed mobility, transfers.  Patient to report at least 50% improvement in LBP to facilitate ADL.     Plan: Continue PT 1-2 x weekly for 9-12 PT visits. F/u on upper trap c/o. Progress SIJ interventions, hip/lumbar stabilization, pt education.   Date:      3/14/2024           TX#: 5/6-8 Date: 3/20/2024  Tx#: 6/8 Date: 3/27/2024  Tx #: 7/8 Date: 4/17/2024  Tx #: 8/12 (8/8 auth) Date: 4/30/2024  Tx # 9/12 (9/12 auth)   There Ex:   Pt inquired re: tree pose, advised okay as long as does not provoke symptoms.   L/R  SLS with contrateral hip flex, abd, ext x ~ 8 reps each (HEP)  S/L L/R hip circles x ~ 8 reps each clockwise/counterclockwise (HEP)  Pt resisted B hip Er <->IR 5 x 5 sec each   Bridge with pillow for adductor isometric x 5 reps  Supine L/R figure 4 and across body piriformis stretch x 20 sec each (Reviewed long sitting version)  Squat with RTB prox to knees x 10 reps (HEP)  Reformer: B squats x 10 reps R,B,G,Y; L/R unilateral x ~ 15 reps each R,B,G,Y  HEP revision/handout; see below.  There Ex:   S/L L/R hip circles x ~ 8 reps each clockwise/counterclockwise (HEP)  Pt resisted B hip Er <->IR 5 x 5 sec each   Bridge with pillow for adductor isometric x 5 reps  Long sitting L/R gluteal stretch x 20 sec each   Sit - stand with RTB prox to knees x 10 reps  Lateral steps with RTB prox to knees 2 x ~ 40 feet  Monster walk with RTB prox to knees 3 x ~ 16 feet  Reformer: B squats x 10 reps R,B,G,Y; L/R unilateral x ~ 15 reps each R,B,G,Y - progress next session  Seated 3 way lumbar stretch using ball - next session There Ex:    SIJ Muscle energy: hip flexors L: hamstrings R 5 x 5 sec each (HEP)  Bridge with pillow for adductor isometric x 5 reps  Sit - stand with RTB prox to knees x 10 reps  Lateral steps with RTB prox to knees  x ~ 40 feet  Monster walk with RTB prox to knees x ~ 40  feet  Reformer: B squats x 15 reps all springs ; L/R unilateral x ~ 20 reps each  May consider lateral step up progression for HEP There Ex  HEP review; see below.  Per pt request, reviewed/completed:   SIJ Muscle energy: hip flexors L: hamstrings R 5 x 5 sec each (HEP, as needed/helpful)  PT resisted B hip ER - IR 5 x 5 sec each  Bridge x 10 reps  Reformer: B squats x 15 reps all springs ; L/R unilateral x ~ 20 reps each There Ex:   PT resisted B hip ER <-> IR supine 5 x 5 sec each  RTB sit - sand x 10 rps  Lateral and monster walks using RTB x ~ 30 feet each - mild c/o  Reformer: B squats x 15 reps all springs ; L/R unilateral x ~ 20 reps  each - mild c/o, pt declined manual or there ex in effort to reduce c/o.    Manual:   STM L gluteals, ITB with/without foam roller  Long axis L/R unilateral traction 4 x 15 sec on: 15 sec off; B LE 4 x 15 sec on: 15 sec off  Manual:   STM L gluteals, ITB with/without foam roller; not completed R, pt denies c/o  Grade II central sacral P/A  Long axis L/R unilateral traction 4 x 15 sec on: 15 sec off; B LE 4 x 15 sec on: 15 sec off  Manual:   STM/myofascial release L gluteals   Grade II central sacral P/A  Long axis L/R unilateral traction 4 x 15 sec on: 15 sec off; B LE 4 x 15 sec on: 15 sec off  Manual:   STM/myofascial release L gluteals   Grade II central sacral P/A  Long axis L/R unilateral traction 4 x 15 sec on: 15 sec off; B LE 4 x 15 sec on: 15 sec off  Manual:   STM/myofascial release L gluteals   Grade II central sacral P/A  Long axis L/R unilateral traction 4 x 15 sec on: 15 sec off; B LE 4 x 15 sec on: 15 sec off    TA:   Pt education re: continuation of PT HEP 3-4 x weekly to maintain progress after discharge from PT; strengthening/stretching, equal LE WB in effort to reduce/manage R SIJ c/o  TA:   Sitting posture: lumbar support, limit sitting time, avoid crossing LE TA:   Bodymechanics for lifting and rotation TA:   For neck c/o, may consider: Theracane, STM, upper trap stretches, MHP; for neck c/o with overhead triceps ext, advised pt to consider other triceps options - supine or standing;  posture, computer ergonomics          HEP: supine LTR,  supine across body piriformis stretch, tennis ball STM gluteals, bridge with pillow for adductor isoemtric, prone lumbar ext/on elbows, MHP; long axis lumbar traction, mini squat with RTB and/or lateral steps with  RTB and/or s/l hip circles, SLS with contralateral hip flex/abd/ext.  Pt reports completing tree pose. SIJ muscle energy as needed  Charges: 1 TE (20 min), 1 manual (10 min), 1 TA (10 min) Total Timed Treatment: 40 min  Total Treatment Time: 45  min

## 2024-05-09 ENCOUNTER — HOSPITAL ENCOUNTER (OUTPATIENT)
Dept: GENERAL RADIOLOGY | Age: 69
Discharge: HOME | End: 2024-05-09
Attending: INTERNAL MEDICINE
Payer: MEDICARE

## 2024-05-09 DIAGNOSIS — R52 PAIN: ICD-10-CM

## 2024-05-09 PROCEDURE — 73630 X-RAY EXAM OF FOOT: CPT | Performed by: INTERNAL MEDICINE

## 2024-05-13 ENCOUNTER — APPOINTMENT (OUTPATIENT)
Dept: PHYSICAL THERAPY | Age: 69
End: 2024-05-13
Attending: INTERNAL MEDICINE
Payer: MEDICARE

## 2024-05-14 ENCOUNTER — APPOINTMENT (OUTPATIENT)
Dept: PHYSICAL THERAPY | Age: 69
End: 2024-05-14
Attending: INTERNAL MEDICINE
Payer: MEDICARE

## 2024-05-16 ENCOUNTER — OFFICE VISIT (OUTPATIENT)
Dept: PHYSICAL THERAPY | Age: 69
End: 2024-05-16
Attending: INTERNAL MEDICINE
Payer: MEDICARE

## 2024-05-16 PROCEDURE — 97110 THERAPEUTIC EXERCISES: CPT | Performed by: PHYSICAL THERAPIST

## 2024-05-16 PROCEDURE — 97530 THERAPEUTIC ACTIVITIES: CPT | Performed by: PHYSICAL THERAPIST

## 2024-05-16 NOTE — PROGRESS NOTES
Diagnosis:    Lumbar spondylosis (M47.816)    Referring Provider: Kacey  Date of Evaluation:    2/21/2024    Precautions:  hx of CA Next MD visit:   4/6/2024  Date of Surgery: n/a   Insurance Primary/Secondary: [employer] [facility] / MEDICAID     # Auth Visits:        12 through 7/2024    Subjective:   LB is much, much better. Feel best with regular HEP compliance. C/o mid foot pain; had xray.  Deny LBP. C/o lateral hip discomfort.     Pain: 1/10    Objective:   Palpation: + c/o prox L gluteals (less so than in the past), + c/o L prox ITB. + c/o/possible edema longitudinal arch R foot, navicular    Standing: pes planus    R ankle AROM grossly WFL  R ankle MMT: c/o with inversion 5/5; eversion/DF each 5/5; PF at least 3/5      Assessment: Pt returns to PT after last attending 4/30/2024. She reports LB doing much better, Hep compliance reduces c/o. Pt with some c/o lateral gluteal/ITB discomfort today; completed there ex/manual to address, reviewed HEP considerations.  Pt inquired re: arch pain R foot. Advised  limited ability to evaluate this region, since no PT order. Pt with pes planus, + c/o R arch of foot. Briefly reviewed considerations (see below); advised physician f/u for c/o.  Pt with + R foot xray findings but mostly for R 1st MTP joint. R foot c/o seem at least partially soft tissue in nature. Pt tolerated session well.       Goals:   (to be met in 6-8 visits)   Patient to report independence with PT HEP to facilitate self management and to maintain progress made in PT.   Patient to report knowledge of proper bodymechanics, computer ergonomics, sitting posture in order to reduce risk for LBP.   Patient to have L/R hip ext MMT 5/5 in order to negotiate stairs reciprocally without c/o.   Patient to have ~ 80 deg active L/R lumbar rotation without c/o to facilitate bed mobility, transfers.  Patient to report at least 50% improvement in LBP to facilitate ADL.     Plan: Continue PT 1-2 x weekly for 9-12 PT visits. F/u  on foot c/o. Progress SIJ interventions, hip/lumbar stabilization, pt education.   Date: 3/27/2024  Tx #: 7/8 Date: 4/17/2024  Tx #: 8/12 (8/8 auth) Date: 4/30/2024  Tx # 9/12 (9/12 auth) Date: 5/16/2024  Tx #: 10/12 (10/12 auth)   There Ex:    SIJ Muscle energy: hip flexors L: hamstrings R 5 x 5 sec each (HEP)  Bridge with pillow for adductor isometric x 5 reps  Sit - stand with RTB prox to knees x 10 reps  Lateral steps with RTB prox to knees  x ~ 40 feet  Monster walk with RTB prox to knees x ~ 40  feet  Reformer: B squats x 15 reps all springs ; L/R unilateral x ~ 20 reps each  May consider lateral step up progression for HEP There Ex  HEP review; see below.  Per pt request, reviewed/completed:   SIJ Muscle energy: hip flexors L: hamstrings R 5 x 5 sec each (HEP, as needed/helpful)  PT resisted B hip ER - IR 5 x 5 sec each  Bridge x 10 reps  Reformer: B squats x 15 reps all springs ; L/R unilateral x ~ 20 reps each There Ex:   PT resisted B hip ER <-> IR supine 5 x 5 sec each  RTB sit - sand x 10 rps  Lateral and monster walks using RTB x ~ 30 feet each - mild c/o  Reformer: B squats x 15 reps all springs ; L/R unilateral x ~ 20 reps each - mild c/o, pt declined manual or there ex in effort to reduce c/o.  There Ex:   HEP review/considerations for L greater trochanter c/o, prioritize: s/l hip circles, piriformis stretches  Hep review; see below.  S/L L/R hip Er x 30 reps  Supine PT across body ITB stretch using strap 3 x20 sec each   Long sitting L/R gluteal stretch 2 x 20 sec each  Quadruped hip ext - pt reports completes bird dog at home, stopped; quadruped  hip abd/shoulder horiz abd x 10 reps each      Manual:   STM/myofascial release L gluteals   Grade II central sacral P/A  Long axis L/R unilateral traction 4 x 15 sec on: 15 sec off; B LE 4 x 15 sec on: 15 sec off  Manual:   STM/myofascial release L gluteals   Grade II central sacral P/A  Long axis L/R unilateral traction 4 x 15 sec on: 15 sec off; B LE 4  x 15 sec on: 15 sec off  Manual:   STM/myofascial release L gluteals   Grade II central sacral P/A  Long axis L/R unilateral traction 4 x 15 sec on: 15 sec off; B LE 4 x 15 sec on: 15 sec off  Manual:   STM L gluteals, ITB   TA:   Sitting posture: lumbar support, limit sitting time, avoid crossing LE TA:   Bodymechanics for lifting and rotation TA:   For neck c/o, may consider: Theracane, STM, upper trap stretches, MHP; for neck c/o with overhead triceps ext, advised pt to consider other triceps options - supine or standing;  posture, computer ergonomics TA:   Considerations for R foot c/o, but advised have not completed formal PT Eval/pt to f/u with physician re: c/o  : arch support in shoes, STM foot arch using water bottle, towel gripping with toes, gastroc stretch standing or on stairs         HEP: supine LTR,  supine across body piriformis stretch, tennis ball STM gluteals, bridge with pillow for adductor isoemtric, prone lumbar ext/on elbows, MHP; long axis lumbar traction - as needed, mini squat with RTB and/or lateral steps with  RTB and/or s/l hip circles, SLS with contralateral hip flex/abd/ext - discontinue due to foot c/o.  Pt reports completing tree pose. SIJ muscle energy as needed  Charges: 2 TE (25 min), 0 manual (5 min), 1 TA (10 min) Total Timed Treatment: 40 min  Total Treatment Time: 45 min

## 2024-05-20 ENCOUNTER — OFFICE VISIT (OUTPATIENT)
Dept: PHYSICAL THERAPY | Age: 69
End: 2024-05-20
Attending: INTERNAL MEDICINE
Payer: MEDICARE

## 2024-05-20 PROCEDURE — 97140 MANUAL THERAPY 1/> REGIONS: CPT | Performed by: PHYSICAL THERAPIST

## 2024-05-20 PROCEDURE — 97110 THERAPEUTIC EXERCISES: CPT | Performed by: PHYSICAL THERAPIST

## 2024-05-20 NOTE — PROGRESS NOTES
Diagnosis:    Lumbar spondylosis (M47.816)    Referring Provider: Kacey  Date of Evaluation:    2/21/2024    Precautions:  hx of CA Next MD visit:   4/6/2024  Date of Surgery: n/a   Insurance Primary/Secondary: [employer] [facility] / MEDICAID     # Auth Visits:        12 through 7/2024    Subjective:   Non- compliant with PT, increased humidity, increased sitting/driving possibly contributing to some increased LBP.  Feel better when I walk more and complete HEP. Cold water bottle massage for foot is helping me. Dr. Erazo referred me to podiatry.     Pain: 1/10    Objective:   Palpation: + c/o prox L gluteals (less so than in the past), + c/o L prox ITB    Gait/transfers: pt independent without c/o.       Assessment:   Pt with improvement in LBP/L L SIJ c/o overall but notes increased symptoms with increased sitting, non-HEP compliance. Advised pt may consider physiatry referral for chronic SIJ c/o but pt does not feel that c/o severe enough, able to manage with HEP at this time. Pt continues to tolerated PT sessions well.     Goals:   (to be met in 6-8 visits)   Patient to report independence with PT HEP to facilitate self management and to maintain progress made in PT.   Patient to report knowledge of proper bodymechanics, computer ergonomics, sitting posture in order to reduce risk for LBP.   Patient to have L/R hip ext MMT 5/5 in order to negotiate stairs reciprocally without c/o.   Patient to have ~ 80 deg active L/R lumbar rotation without c/o to facilitate bed mobility, transfers.  Patient to report at least 50% improvement in LBP to facilitate ADL.     Plan: Continue PT 1-2 x weekly for 9-12 PT visits. Consider discharge after 1 more session.  Progress SIJ interventions, hip/lumbar stabilization, pt education.   Date: 4/17/2024  Tx #: 8/12 (8/8 auth) Date: 4/30/2024  Tx # 9/12 (9/12 auth) Date: 5/16/2024  Tx #: 10/12 (10/12 auth) Date: 5/20/2024  Tx #: 11/12 (11/12 auth)   There Ex  HEP review; see below.  Per pt  request, reviewed/completed:   SIJ Muscle energy: hip flexors L: hamstrings R 5 x 5 sec each (HEP, as needed/helpful)  PT resisted B hip ER - IR 5 x 5 sec each  Bridge x 10 reps  Reformer: B squats x 15 reps all springs ; L/R unilateral x ~ 20 reps each There Ex:   PT resisted B hip ER <-> IR supine 5 x 5 sec each  RTB sit - sand x 10 rps  Lateral and monster walks using RTB x ~ 30 feet each - mild c/o  Reformer: B squats x 15 reps all springs ; L/R unilateral x ~ 20 reps each - mild c/o, pt declined manual or there ex in effort to reduce c/o.  There Ex:   HEP review/considerations for L greater trochanter c/o, prioritize: s/l hip circles, piriformis stretches  Hep review; see below.  S/L L/R hip Er x 30 reps  Supine PT across body ITB stretch using strap 3 x20 sec each   Long sitting L/R gluteal stretch 2 x 20 sec each  Quadruped hip ext - pt reports completes bird dog at home, stopped; quadruped  hip abd/shoulder horiz abd x 10 reps each    There Ex:   PT resisted hip IR <->ER 5 x 5 sec each  Bridge with ball for adductor isometric x 15 reps  Supine across body ITB stretch using strap  3 x 20 sec each L/R  Long sitting L/R gluteal stretch x 30 sec each  Quadruped hip abd/shoulder horiz abd x 10 reps each  Reformer: B squats using B LE x 20 reps all springs; L/R unilateral x 20 reps all springs   Manual:   STM/myofascial release L gluteals   Grade II central sacral P/A  Long axis L/R unilateral traction 4 x 15 sec on: 15 sec off; B LE 4 x 15 sec on: 15 sec off  Manual:   STM/myofascial release L gluteals   Grade II central sacral P/A  Long axis L/R unilateral traction 4 x 15 sec on: 15 sec off; B LE 4 x 15 sec on: 15 sec off  Manual:   STM L gluteals, ITB Manual:   STM L gluteals, ITB  Grade II central sacral P/A  Long axis L/R unilateral traction 4 x 15 sec on: 15 sec off; B LE 4 x 15 sec on: 15 sec off    TA:   Bodymechanics for lifting and rotation TA:   For neck c/o, may consider: Theracane, STM, upper trap  stretches, MHP; for neck c/o with overhead triceps ext, advised pt to consider other triceps options - supine or standing;  posture, computer ergonomics TA:   Considerations for R foot c/o, but advised have not completed formal PT Eval/pt to f/u with physician re: c/o  : arch support in shoes, STM foot arch using water bottle, towel gripping with toes, gastroc stretch standing or on stairs TA:   Brief review of bodymechanics for lifting and rotation         HEP: supine LTR,  supine across body piriformis stretch, tennis ball STM gluteals, bridge with pillow for adductor isoemtric, prone lumbar ext/on elbows, MHP; long axis lumbar traction - as needed, mini squat with RTB and/or lateral steps with  RTB and/or s/l hip circles, SLS with contralateral hip flex/abd/ext - discontinue due to foot c/o.  Pt reports completing tree pose. SIJ muscle energy as needed  Charges: 2 TE (30 min), 1 manual (10 min), 0 TA (2 min) Total Timed Treatment: 42 min  Total Treatment Time: 45 min

## 2024-05-24 ENCOUNTER — OFFICE VISIT (OUTPATIENT)
Dept: PHYSICAL THERAPY | Age: 69
End: 2024-05-24
Attending: INTERNAL MEDICINE
Payer: MEDICARE

## 2024-05-24 PROCEDURE — 97140 MANUAL THERAPY 1/> REGIONS: CPT | Performed by: PHYSICAL THERAPIST

## 2024-05-24 PROCEDURE — 97110 THERAPEUTIC EXERCISES: CPT | Performed by: PHYSICAL THERAPIST

## 2024-05-24 NOTE — PROGRESS NOTES
[place]  Pt has attended 12 visits in Physical Therapy.     Diagnosis:    Lumbar spondylosis (M47.816)    Referring Provider: Kacey  Date of Evaluation:    2/21/2024    Precautions:  hx of CA Next MD visit:   4/6/2024  Date of Surgery: n/a   Insurance Primary/Secondary: [employer] [facility] / MEDICAID     # Auth Visits:        12 through 7/2024    Subjective:   LB is 90% better since initiated PT.  Can continue with PT HEP after today's session.  Have podiatry referral for foot c/o.   Pain: 1/10    Objective:   Palpation: + c/o small area of prox L gluteals (less so than in the past), Denies L prox ITB    Gait/transfers: pt independent without c/o.     Lumbar Spine AROM: (* denotes performed with pain)  Flexion: hands to feet    Extension: WNL  Sidebending: R/L WNL *c/o stiffness   Rotation: R 90 deg ; L 90 deg* c/o stiffness  Hip PROM:   Mild restriction L/R ER  Mild restriction L/R abd  Mild restriction L/R hip flexoin    Accessory motion: Pt denies c/o with lumbar and L SIJ P/A    Strength: (* denotes performed with pain)  LE   Hip abduction: R 5-/5; L 5-/5  Hip Extension: R 4+/5; L 4+/5   Hip ER: R 5-/5; L 5-/5  Knee Flexion: R 5/5; L 5/5   Knee extension (L3): R 5/5; L 5/5   DF (L4): R 5/5; L 5/5     Gait: pt ambulates on level ground WNL  Patient able to reciprocally negotiate 6 inch steps with min c/o L SIJ pain    Assessment:   Pt has progressed well overall, reporting less c/o LBP/L SIJ pain and less limitation with ADL. She reports symptom reduction and improved ADL tolerance with HEP compliance and with walking and less sitting.  Advised pt may consider physiatry referral for chronic SIJ c/o but pt does not feel that c/o severe enough, able to manage with HEP at this time. Pt appropriate for discharge due to progress, I c HEP, goals overall met.     Goals:   (to be met in 6-8 visits)   Patient to report independence with PT HEP to facilitate self management and to maintain progress made in PT. -  MET  Patient to report knowledge of proper bodymechanics, computer ergonomics, sitting posture in order to reduce risk for LBP. - MET  Patient to have L/R hip ext MMT 5/5 in order to negotiate stairs reciprocally without c/o. - PROGRESS  Patient to have ~ 80 deg active L/R lumbar rotation without c/o to facilitate bed mobility, transfers. - MET  Patient to report at least 50% improvement in LBP to facilitate ADL. - MET    Plan:  Discharge patient to HEP due to progress, I c HEP, goals overall met. Pt to f/u with physician for any c/o/concerns.     Date: 4/30/2024  Tx # 9/12 (9/12 auth) Date: 5/16/2024  Tx #: 10/12 (10/12 auth) Date: 5/20/2024  Tx #: 11/12 (11/12 auth) Date: 5/24/2024  Tx #: 12/12 (12/12)   There Ex:   PT resisted B hip ER <-> IR supine 5 x 5 sec each  RTB sit - sand x 10 rps  Lateral and monster walks using RTB x ~ 30 feet each - mild c/o  Reformer: B squats x 15 reps all springs ; L/R unilateral x ~ 20 reps each - mild c/o, pt declined manual or there ex in effort to reduce c/o.  There Ex:   HEP review/considerations for L greater trochanter c/o, prioritize: s/l hip circles, piriformis stretches  Hep review; see below.  S/L L/R hip Er x 30 reps  Supine PT across body ITB stretch using strap 3 x20 sec each   Long sitting L/R gluteal stretch 2 x 20 sec each  Quadruped hip ext - pt reports completes bird dog at home, stopped; quadruped  hip abd/shoulder horiz abd x 10 reps each    There Ex:   PT resisted hip IR <->ER 5 x 5 sec each  Bridge with ball for adductor isometric x 15 reps  Supine across body ITB stretch using strap  3 x 20 sec each L/R  Long sitting L/R gluteal stretch x 30 sec each  Quadruped hip abd/shoulder horiz abd x 10 reps each  Reformer: B squats using B LE x 20 reps all springs; L/R unilateral x 20 reps all springs There Ex:   HEP review; see below. May consider single knee to shoulder, happy baby as well.   Single knee to shoulder 3 x 5 sec each L/R  PT resisted hip IR <->ER 5 x 5  sec each  Bridge with ball for adductor isometric x 15 reps  Standing alternate L/R hip abd x 10 reps each  Reformer: B squats using B LE x 20 reps all springs; L/R unilateral x 20 reps all springs   Manual:   STM/myofascial release L gluteals   Grade II central sacral P/A  Long axis L/R unilateral traction 4 x 15 sec on: 15 sec off; B LE 4 x 15 sec on: 15 sec off  Manual:   STM L gluteals, ITB Manual:   STM L gluteals, ITB  Grade II central sacral P/A  Long axis L/R unilateral traction 4 x 15 sec on: 15 sec off; B LE 4 x 15 sec on: 15 sec off  Manual:   STM L gluteals, ITB  Long axis L/R unilateral traction 4 x 15 sec on: 15 sec off; B LE 4 x 15 sec on: 15 sec off    TA:   For neck c/o, may consider: Theracane, STM, upper trap stretches, MHP; for neck c/o with overhead triceps ext, advised pt to consider other triceps options - supine or standing;  posture, computer ergonomics TA:   Considerations for R foot c/o, but advised have not completed formal PT Eval/pt to f/u with physician re: c/o  : arch support in shoes, STM foot arch using water bottle, towel gripping with toes, gastroc stretch standing or on stairs TA:   Brief review of bodymechanics for lifting and rotation          HEP: supine LTR,  supine across body piriformis stretch, tennis ball STM gluteals, bridge with pillow for adductor isoemtric, prone lumbar ext/on elbows, MHP; long axis lumbar traction - as needed, mini squat with RTB and/or lateral steps with  RTB and/or s/l hip circles, SLS with contralateral hip flex/abd/ext - discontinue due to foot c/o.  Pt reports completing tree pose. SIJ muscle energy as needed. May consider happy baby, single to shoulder  Charges: 2 TE (30 min), 1 manual (10 min) Total Timed Treatment: 42 min  Total Treatment Time: 45 min    Oswestry Disability Index Score  Score: 26 % (2/21/2024  3:42 PM)    Post Oswestry Disability Index Score  Post Score: 6 % (5/24/2024 10:04 AM)    20 % improvement    Plan: Discharge patient  to HEP due to progress, I c HEP, goals overall met. Pt to f/u with physician for any c/o/concerns.     Patient/Family/Caregiver was advised of these findings, precautions, and treatment options and has agreed to actively participate in planning and for this course of care.    Thank you for your referral. If you have any questions, please contact me at Dept: [phone].    Sincerely,  Electronically signed by therapist: Rachel Pan PT     Physician's certification required:  Yes  Please co-sign or sign and return this letter via fax as soon as possible to [phone].   I certify the need for these services furnished under this plan of treatment and while under my care.    X___________________________________________________ Date____________________    Certification From: 5/24/2024  To:8/22/2024

## 2024-06-03 ENCOUNTER — HOSPITAL ENCOUNTER (OUTPATIENT)
Dept: MAMMOGRAPHY | Facility: HOSPITAL | Age: 69
Discharge: HOME | End: 2024-06-03
Attending: INTERNAL MEDICINE
Payer: MEDICARE

## 2024-06-03 DIAGNOSIS — Z12.31 SCREENING MAMMOGRAM, ENCOUNTER FOR: ICD-10-CM

## 2024-06-03 PROCEDURE — 77067 SCR MAMMO BI INCL CAD: CPT | Performed by: INTERNAL MEDICINE

## 2024-06-03 PROCEDURE — 77063 BREAST TOMOSYNTHESIS BI: CPT | Performed by: INTERNAL MEDICINE

## 2024-06-06 ENCOUNTER — HOSPITAL ENCOUNTER (OUTPATIENT)
Dept: GENERAL RADIOLOGY | Age: 69
Discharge: HOME | End: 2024-06-06
Attending: INTERNAL MEDICINE
Payer: MEDICARE

## 2024-06-06 DIAGNOSIS — M54.31 RIGHT SIDED SCIATICA: ICD-10-CM

## 2024-06-06 DIAGNOSIS — M54.31 RIGHT SIDED SCIATICA: Primary | ICD-10-CM

## 2024-06-06 PROCEDURE — 72110 X-RAY EXAM L-2 SPINE 4/>VWS: CPT | Performed by: INTERNAL MEDICINE

## 2024-06-06 RX ORDER — DICLOFENAC SODIUM 75 MG/1
75 TABLET, DELAYED RELEASE ORAL 2 TIMES DAILY
Qty: 60 TABLET | Refills: 3 | Status: SHIPPED | OUTPATIENT
Start: 2024-06-06

## 2024-06-10 ENCOUNTER — APPOINTMENT (OUTPATIENT)
Dept: PHYSICAL THERAPY | Age: 69
End: 2024-06-10
Attending: INTERNAL MEDICINE
Payer: MEDICARE

## 2024-06-17 ENCOUNTER — OFFICE VISIT (OUTPATIENT)
Dept: HEMATOLOGY/ONCOLOGY | Facility: HOSPITAL | Age: 69
End: 2024-06-17
Attending: INTERNAL MEDICINE
Payer: MEDICARE

## 2024-06-17 VITALS
OXYGEN SATURATION: 98 % | DIASTOLIC BLOOD PRESSURE: 81 MMHG | BODY MASS INDEX: 25.04 KG/M2 | TEMPERATURE: 98 F | HEIGHT: 66 IN | RESPIRATION RATE: 16 BRPM | HEART RATE: 63 BPM | SYSTOLIC BLOOD PRESSURE: 165 MMHG | WEIGHT: 155.81 LBS

## 2024-06-17 DIAGNOSIS — Z85.3 ENCOUNTER FOR FOLLOW-UP SURVEILLANCE OF BREAST CANCER: ICD-10-CM

## 2024-06-17 DIAGNOSIS — Z85.3 HISTORY OF RIGHT BREAST CANCER: Primary | ICD-10-CM

## 2024-06-17 DIAGNOSIS — Z12.31 SCREENING MAMMOGRAM, ENCOUNTER FOR: ICD-10-CM

## 2024-06-17 DIAGNOSIS — Z08 ENCOUNTER FOR FOLLOW-UP SURVEILLANCE OF BREAST CANCER: ICD-10-CM

## 2024-06-17 PROCEDURE — 99213 OFFICE O/P EST LOW 20 MIN: CPT | Performed by: INTERNAL MEDICINE

## 2024-06-17 NOTE — PROGRESS NOTES
HPI:  Nicole Mason is a 68 year old female with diagnosis of   Encounter Diagnoses   Name Primary?    History of right breast cancer Yes    Screening mammogram, encounter for     Encounter for follow-up surveillance of breast cancer        Feeling OK.    Keeping active. Weight stable.  States exercising.  Back and hip pain improves with exercise and swimming.     No changes on SBE and states ROM is good on the RUE.      PN continues to improve.  Takes gabapentin every day or bid as needed.    States she is working on smoking cessation.  January and February she quit smoking.  She is having stress and is smoking some, about 1/2 PPD.      Had more back pain, had XRAY and HAROON.  Has DJD and scoliosis.      ROS:  Review of Systems   Constitutional:  Negative for appetite change, fatigue and unexpected weight change.   Respiratory:  Negative for cough and shortness of breath.    Cardiovascular:  Negative for chest pain.   Gastrointestinal:  Negative for abdominal pain.   Genitourinary:  Positive for frequency and nocturia.    Musculoskeletal:  Positive for arthralgias (L hip improved with therapy.) and back pain (lumbar spine, improving with activity.  Worse driving.  Better with ibuprofen.). Negative for neck pain.        No bone pain.    States has to keep moving to prevent pain.   Neurological:  Negative for dizziness and headaches.   Hematological:  Negative for adenopathy.   Psychiatric/Behavioral:  Positive for sleep disturbance (from stress).          ALLERGIES AND MEDICATIONS REVIEWED WITH PATIENT:    Allergies:  Allergies   Allergen Reactions    Penicillins RASH         Current Outpatient Medications:     diclofenac 75 MG Oral Tab EC, Take 1 tablet (75 mg total) by mouth 2 (two) times daily., Disp: 60 tablet, Rfl: 3    levothyroxine 112 MCG Oral Tab, Take 1 tablet (112 mcg total) by mouth before breakfast., Disp: 90 tablet, Rfl: 3    clobetasol 0.05 % External Cream, Apply 1 Application topically 2 (two)  times daily., Disp: 30 g, Rfl: 2    albuterol 108 (90 Base) MCG/ACT Inhalation Aero Soln, Inhale 2 puffs into the lungs every 6 (six) hours as needed for Wheezing., Disp: 1 each, Rfl: 3    losartan 25 MG Oral Tab, Take 1 tablet (25 mg total) by mouth daily., Disp: 90 tablet, Rfl: 3    levothyroxine 100 MCG Oral Tab, Take 1 tablet (100 mcg total) by mouth daily., Disp: 90 tablet, Rfl: 3    rosuvastatin 20 MG Oral Tab, Take 1 tablet (20 mg total) by mouth nightly., Disp: 90 tablet, Rfl: 3    gabapentin 300 MG Oral Cap, TAKE 1 CAPSULE BY MOUTH TWO TIMES DAILY, Disp: 90 capsule, Rfl: 3    Mometasone Furo-Formoterol Fum (DULERA) 100-5 MCG/ACT Inhalation Aerosol, Inhale 2 puffs into the lungs 2 (two) times a day., Disp: 1 each, Rfl: 11    Vitamin D3 1000 UNITS Oral Tab, Take 1 tablet (1,000 Units total) by mouth daily., Disp: , Rfl:     Coenzyme Q10 (COQ10 OR), Take by mouth daily.  , Disp: , Rfl:     LUTEIN-ZEAXANTHIN OR, Take by mouth daily.  , Disp: , Rfl:           HISTORY REVIEWED WITH PATIENT:  Past Medical History:    Breast cancer (HCC)    right 2016    Cataract    Left eye    Disorder of thyroid    hypothyroid    High cholesterol    Personal history of antineoplastic chemotherapy    TCHP 6 cycles (3 completed), Herceptin every 3 weeks (last 2/22/17)      Past Surgical History:   Procedure Laterality Date    Chemotherapy  2016    Dilation/curettage,diagnostic      Lumpectomy right  2017    Other      excision of lipoma at L arm and lower back    Radiation right      2016      Family History   Problem Relation Age of Onset    Diabetes Mother     Heart Disorder Mother         pacmaker    Other (parkinson's disease) Mother     Other (ALS) Father     Breast Cancer Self 61      Social History     Socioeconomic History    Marital status:     Number of children: 0   Occupational History    Occupation: [employer]    Tobacco Use    Smoking status: Some Days     Current packs/day: 0.25     Average packs/day:  0.3 packs/day for 30.0 years (7.5 ttl pk-yrs)     Types: Cigarettes    Smokeless tobacco: Never    Tobacco comments:     plan to quit   Vaping Use    Vaping status: Never Used   Substance and Sexual Activity    Alcohol use: No    Drug use: No   Other Topics Concern    Caffeine Concern Yes     Comment: 2 cups of coffee daily            Exam:  BP (!) 165/81 (BP Location: Left arm, Patient Position: Sitting, Cuff Size: adult)   Pulse 63   Temp 97.8 °F (36.6 °C) (Oral)   Resp 16   Ht 1.676 m (5' 6\")   Wt 70.7 kg (155 lb 12.8 oz)   SpO2 98%   BMI 25.15 kg/m²   Wt Readings from Last 6 Encounters:   06/17/24 70.7 kg (155 lb 12.8 oz)   04/06/24 69.4 kg (153 lb)   02/02/24 68 kg (150 lb)   10/13/23 70.3 kg (155 lb)   06/15/23 69.9 kg (154 lb)   05/19/23 71.2 kg (157 lb)     General: Patient is alert, not in acute distress.  HEENT: EOMs intact. PERRL.   Neck: No JVD. No palpable lymphadenopathy. Neck is supple.  Chest: Clear to auscultation.  Port scar on L  Breasts: R breast with surgical scar, no masses.  L breast w/o masses.  Heart: Regular rate and rhythm.   Abdomen: Soft, non tender with good bowel sounds.  Extremities: No edema.  Neurological: Grossly intact.   Lymphatics: There is no palpable lymphadenopathy throughout in the cervical, supraclavicular, axillary, or inguinal regions.  Psych/Depression: nl      Assessment and Plan:    Nicole Mason is a 68 year old female being evaluated for   Encounter Diagnoses   Name Primary?    History of right breast cancer Yes    Screening mammogram, encounter for     Encounter for follow-up surveillance of breast cancer        Breast cancer of upper-outer quadrant of right female breast (HCC)    Staging form: Breast, AJCC V7      Clinical stage from 10/5/2016: Stage IIA (T1c(3), N1, M0) - Signed by Leni Delgado MD on 10/5/2016      Pathologic stage from 3/22/2017: yT0, N0, cM0 - Signed by Leni Delgado MD on 3/22/2017      Patient achieved a pCR after neoadjuvant  chemotherapy with s/p cycle 6 TCHP and a year of herceptin.      Prognosis is good.    B chloe June of 2024  BiRads 2.   Will have B mammogram in June of 2025.    Lymphedema sleeve to prevent lymphedema during travel.    PN due to chemo:  Continue neurontin per Dr. Erazo.      Continue exercising and diet.    Smoking cessation.      All questions answered to the best of my abilities.  Support provided to the patient.      F/u in 12 months with mammogram.    MDM low      Data:  Narrative   PROCEDURE: XR LUMBAR SPINE (MIN 4 VIEWS) (CPT=72110)      COMPARISON: [facility], CT CHEST ABDOMEN PELVIS (ALL CONTRAST ONLY) (CPT=71260/70428), 10/11/2016, 9:07 AM.  [facility], XR LUMBAR SPINE (MIN 4 VIEWS) (CPT=72110), 5/26/2021, 10:47 AM.      INDICATIONS: Right-sided low back pain with lumbar radiculopathy radiating anteriorly to right hip over the preceding 3 days. No precipitating antecedent injury.      TECHNIQUE: Lumbar spine radiographs (minimum 4 views)       FINDINGS:   ALIGNMENT:   Dextroscoliosis of the lumbar spine is appreciated. The anatomic lumbar lordosis is preserved.   VERTEBRAL BODIES:   A total of 5 non-rib-bearing lumbar-type vertebral bodies are identified. No fracture, subluxation, or bony lesion is visible. Multilevel anterior osteophyte formation is appreciated.   DISC SPACES: Multilevel degenerative disc disease is present, particularly at L2-L3, with mild to moderate degeneration at L1-L2 and L3-L4, as well as mild narrowing at L5-S1.   SACROILIAC JOINTS: Mild sclerosis is present.   OBLIQUE VIEWS: No defects of the pars interarticularis are identified. There is mild facet arthrosis of the lower lumbar levels.   OTHER: A heavy stool burden is demonstrated.                   Impression   CONCLUSION:   1. Lumbar dextroscoliosis and multilevel degenerative disc disease, particularly at L2-L3.      2. No radiographically visible acute osseous injury of the lumbar spine.             Dictated by (CST): Brayan Torrez MD on 6/06/2024 at 5:14 PM       Finalized by (CST): Brayan Torrez MD on 6/06/2024 at 5:16 PM         PROCEDURE: [facility] JETT 2D+3D SCREENING BILAT ([id number])      COMPARISON: [facility], [facility] JETT 2D+3D SCREENING BILAT ([id number]), 5/11/2021, 12:15 PM.  [facility], [facility] JETT 2D+3D SCREENING BILAT ([id number]), 5/17/2022, 11:31 AM.  [facility], [facility] JETT 2D+3D DIAGNOSTIC ADDL VWS  RIGHT (EBZ=[id number]), 6/02/2022, 10:57 AM.  [facility], [facility] JETT 2D+3D SCREENING BILAT ([id number]), 5/19/2023, 10:42 AM.      INDICATIONS: Z12.31 Screening mammogram, encounter for      TECHNIQUE: Full field direct screening mammography was performed and images were reviewed with the [employer] GLENN 1.5.1.5 CAD device.  3D tomosynthesis was performed and reviewed         BREAST COMPOSITION:   Category c-Heterogeneously dense, which may obscure small masses.         FINDINGS: Postsurgical change is again seen in the right breast similar to prior examinations.  The parenchymal pattern otherwise in both breasts is unchanged.  No suspicious mass, calcification or distortion seen in either breast.                  Impression  CONCLUSION:   No mammographic evidence of malignancy.      BI-RADS CATEGORY:     DIAGNOSTIC CATEGORY 2--BENIGN FINDING:       RECOMMENDATIONS:   ROUTINE MAMMOGRAM AND CLINICAL EVALUATION IN 12 MONTHS.                   PLEASE NOTE: NORMAL MAMMOGRAM DOES NOT EXCLUDE THE POSSIBILITY OF BREAST CANCER.  A CLINICALLY SUSPICIOUS PALPABLE LUMP SHOULD BE BIOPSIED.       For patients over the age of 40, the target due date for the patient's next mammogram has been entered into a reminder system.       Patient received a discharge summary from the technologist after completion of exam.      Breast marker legend used on images    Triangle = Palpable lump   [place] = Skin tag or mole   BB = Nipple    Linear shailesh = Scar   Square = Pain            Dictated by (CST): Nathan Riggins MD on 6/04/2024 at 10:00 AM       Finalized by (CST): Nathan Riggins MD on 6/04/2024 at 10:01 AM

## 2024-06-24 NOTE — PROGRESS NOTES
ran - last filled 5/29 - set up and post dated   Breast Surgery Surveillance Visit    Diagnosis: Infiltrating Ductal Carcinoma, right breast; ER negative, MA negative, Her-2/clif positive status post lumpectomy with SLNB on March 13, 2017    Stage: Breast cancer of upper-outer quadrant of right female rusty complication. She denies fevers, chills, erythema or drainage from her incisions. She is here today for evaluation and recommendations for further therapy.         Past Medical History   Diagnosis Date   • Cataract      Left eye   • Breast cancer ([facility] [address] History:   Family History   Problem Relation Age of Onset   • parkinson's disease[other] [OTHER] Mother    • Diabetes Mother    • Heart Disorder Mother      pacmaker   • ALS[other] [OTHER] Father    She is not of Ashkenazi [place] ancestry.     Social Histor decreased urine stream, blood in the urine or vaginal/penile discharge. Skin:    The patient denies rash, itching, skin lesions, dry skin, change in skin color or change in moles.      Hematologic/Lymphatic:  The patient denies easily bruising or bleedin to start RT to augment loco-regional control. She will return to see me in 6 months with a Bilateral mammogram due at that time.  I encouraged her to continue monitoring her ROM and strength and explained that a referral to physical therapy may be warrante

## 2024-07-29 ENCOUNTER — OFFICE VISIT (OUTPATIENT)
Dept: PODIATRY CLINIC | Facility: CLINIC | Age: 69
End: 2024-07-29

## 2024-07-29 DIAGNOSIS — M76.821 POSTERIOR TIBIAL TENDINITIS OF BOTH LOWER EXTREMITIES: Primary | ICD-10-CM

## 2024-07-29 DIAGNOSIS — M76.822 POSTERIOR TIBIAL TENDINITIS OF BOTH LOWER EXTREMITIES: Primary | ICD-10-CM

## 2024-07-29 PROCEDURE — 99203 OFFICE O/P NEW LOW 30 MIN: CPT | Performed by: STUDENT IN AN ORGANIZED HEALTH CARE EDUCATION/TRAINING PROGRAM

## 2024-07-29 PROCEDURE — 1159F MED LIST DOCD IN RCRD: CPT | Performed by: STUDENT IN AN ORGANIZED HEALTH CARE EDUCATION/TRAINING PROGRAM

## 2024-07-30 NOTE — PROGRESS NOTES
[facility]  Progress Note      Nicole Mason is a 69 year old female.   Chief Complaint   Patient presents with    Foot Pain     New pt- R foot -onset- 4/2024- denies injury -stated had chemo in 2017- rates pain 2-5/10 on and off-  has xray in the system             HPI:     Patient is a pleasant 69-year-old female presents to clinic for bilateral foot evaluation.  Admits to pain along the plantar aspect of both feet.  Denies any pedal injuries or trauma.    Allergies: Penicillins    Current Outpatient Medications   Medication Sig Dispense Refill    diclofenac 75 MG Oral Tab EC Take 1 tablet (75 mg total) by mouth 2 (two) times daily. 60 tablet 3    levothyroxine 112 MCG Oral Tab Take 1 tablet (112 mcg total) by mouth before breakfast. 90 tablet 3    clobetasol 0.05 % External Cream Apply 1 Application topically 2 (two) times daily. 30 g 2    albuterol 108 (90 Base) MCG/ACT Inhalation Aero Soln Inhale 2 puffs into the lungs every 6 (six) hours as needed for Wheezing. 1 each 3    losartan 25 MG Oral Tab Take 1 tablet (25 mg total) by mouth daily. 90 tablet 3    levothyroxine 100 MCG Oral Tab Take 1 tablet (100 mcg total) by mouth daily. 90 tablet 3    rosuvastatin 20 MG Oral Tab Take 1 tablet (20 mg total) by mouth nightly. 90 tablet 3    gabapentin 300 MG Oral Cap TAKE 1 CAPSULE BY MOUTH TWO TIMES DAILY 90 capsule 3    Mometasone Furo-Formoterol Fum (DULERA) 100-5 MCG/ACT Inhalation Aerosol Inhale 2 puffs into the lungs 2 (two) times a day. 1 each 11    Vitamin D3 1000 UNITS Oral Tab Take 1 tablet (1,000 Units total) by mouth daily.      Coenzyme Q10 (COQ10 OR) Take by mouth daily.        LUTEIN-ZEAXANTHIN OR Take by mouth daily.          Past Medical History:    Breast cancer (HCC)    right 2016    Cataract    Left eye    Disorder of thyroid    hypothyroid    High cholesterol    Personal history of antineoplastic chemotherapy    TCHP 6 cycles (3 completed), Herceptin every 3 weeks (last 2/22/17)       Past Surgical History:   Procedure Laterality Date    Chemotherapy  2016    Dilation/curettage,diagnostic      Lumpectomy right  2017    Other      excision of lipoma at L arm and lower back    Radiation right      2016      Family History   Problem Relation Age of Onset    Diabetes Mother     Heart Disorder Mother         pacmaker    Other (parkinson's disease) Mother     Other (ALS) Father     Breast Cancer Self 61      Social History     Socioeconomic History    Marital status:     Number of children: 0   Occupational History    Occupation: [employer]    Tobacco Use    Smoking status: Some Days     Current packs/day: 0.25     Average packs/day: 0.3 packs/day for 30.0 years (7.5 ttl pk-yrs)     Types: Cigarettes    Smokeless tobacco: Never    Tobacco comments:     plan to quit   Vaping Use    Vaping status: Never Used   Substance and Sexual Activity    Alcohol use: No    Drug use: No   Other Topics Concern    Caffeine Concern Yes     Comment: 2 cups of coffee daily           REVIEW OF SYSTEMS:     Denies nause, fever, chills  No calf pain  Denies chest pain or SOB      EXAM:   There were no vitals taken for this visit.  GENERAL: well developed, well nourished, in no apparent distress  EXTREMITIES:   1. Integument: Normal skin temperature and turgor  2. Vascular: Dorsalis pedis two out of four bilateral and posterior tibial pulses two out of   four bilateral, capillary refill normal.   3. Musculoskeletal: All muscle groups are graded 5 out of 5 in the foot and ankle.  Tenderness along the posterior tibial tendon insertion   4. Neurological: Normal sharp dull sensation; reflexes normal.             ASSESSMENT AND PLAN:   Diagnoses and all orders for this visit:    Posterior tibial tendinitis of both lower extremities  -     DME - External        Plan:     Patient seen and examined and findings discussed with patient.  I informed patient that there is no signs of fractures or dislocations on her  right foot x-rays however there is arthritis.  I do recommend the patient uses supportive shoes and avoids walking barefoot.  Referral placed for custom orthotics.  Return to clinic if symptoms worsen or fail to improve.    The patient indicates understanding of these issues and agrees to the plan.        Viviane Jiang DPM

## 2024-08-09 ENCOUNTER — OFFICE VISIT (OUTPATIENT)
Dept: INTERNAL MEDICINE CLINIC | Facility: CLINIC | Age: 69
End: 2024-08-09
Payer: MEDICAID

## 2024-08-09 VITALS
SYSTOLIC BLOOD PRESSURE: 140 MMHG | DIASTOLIC BLOOD PRESSURE: 80 MMHG | WEIGHT: 154 LBS | HEART RATE: 72 BPM | OXYGEN SATURATION: 96 % | HEIGHT: 66 IN | BODY MASS INDEX: 24.75 KG/M2

## 2024-08-09 DIAGNOSIS — I10 PRIMARY HYPERTENSION: ICD-10-CM

## 2024-08-09 DIAGNOSIS — E03.9 HYPOTHYROIDISM, UNSPECIFIED TYPE: ICD-10-CM

## 2024-08-09 DIAGNOSIS — E78.2 MIXED HYPERLIPIDEMIA: ICD-10-CM

## 2024-08-09 DIAGNOSIS — Z00.00 MEDICARE ANNUAL WELLNESS VISIT, SUBSEQUENT: Primary | ICD-10-CM

## 2024-08-09 DIAGNOSIS — Z01.00 COMPLETE EYE EXAM, ENCOUNTER FOR: ICD-10-CM

## 2024-08-09 PROCEDURE — G0439 PPPS, SUBSEQ VISIT: HCPCS | Performed by: INTERNAL MEDICINE

## 2024-08-09 RX ORDER — CIPROFLOXACIN 500 MG/1
500 TABLET, FILM COATED ORAL 2 TIMES DAILY
Qty: 20 TABLET | Refills: 3 | Status: SHIPPED | OUTPATIENT
Start: 2024-08-09

## 2024-08-09 NOTE — PROGRESS NOTES
Subjective:   Nicole Mason is a 69 year old female who presents for Physical     Patient here for Ma supervisit and fu on htn, hyperlipidemia and hx of breast cancer.  HPI:   Nicole Mason is a 69 year old female who presents for a MA Supervisit.    Patient Active Problem List   Diagnosis    Hypothyroid    History of right breast cancer    Dense breast    Encounter for monitoring cardiotoxic drug therapy    Peripheral neuropathy due to chemotherapy (HCC)    Encounter for care related to Port-a-Cath    Chronic obstructive pulmonary disease with acute exacerbation (HCC)    Encounter for follow-up surveillance of breast cancer    Encounter for medication monitoring    Screening mammogram, encounter for       General Health                                                   Functional Ability                                                        Functional Status                                     Fall/Risk Assessment                                                              Depression Screening (PHQ-2/PHQ-9): Over the LAST 2 WEEKS                      Advance Directives              Was Medicare Assessment Questionnaire completed by patient and sent to HIM for scanning?Yes    Please go to \"Cognitive Assessment\" under Medicare Assessment section in Charting, test patient and document.    Then, refresh your progress note to see your input here.  Cognitive Assessment                                     ALLERGIES:     Allergies   Allergen Reactions    Penicillins RASH       CURRENT MEDICATIONS:   Current Outpatient Medications   Medication Sig Dispense Refill    diclofenac 75 MG Oral Tab EC Take 1 tablet (75 mg total) by mouth 2 (two) times daily. 60 tablet 3    levothyroxine 112 MCG Oral Tab Take 1 tablet (112 mcg total) by mouth before breakfast. 90 tablet 3    clobetasol 0.05 % External Cream Apply 1 Application topically 2 (two) times daily. 30 g 2    albuterol 108 (90 Base) MCG/ACT Inhalation Aero Soln  Inhale 2 puffs into the lungs every 6 (six) hours as needed for Wheezing. 1 each 3    losartan 25 MG Oral Tab Take 1 tablet (25 mg total) by mouth daily. 90 tablet 3    rosuvastatin 20 MG Oral Tab Take 1 tablet (20 mg total) by mouth nightly. 90 tablet 3    gabapentin 300 MG Oral Cap TAKE 1 CAPSULE BY MOUTH TWO TIMES DAILY 90 capsule 3    Mometasone Furo-Formoterol Fum (DULERA) 100-5 MCG/ACT Inhalation Aerosol Inhale 2 puffs into the lungs 2 (two) times a day. 1 each 11    Vitamin D3 1000 UNITS Oral Tab Take 1 tablet (1,000 Units total) by mouth daily.      Coenzyme Q10 (COQ10 OR) Take by mouth daily.        LUTEIN-ZEAXANTHIN OR Take by mouth daily.          MEDICAL INFORMATION:   Past Medical History:    Breast cancer (HCC)    right 2016    Cataract    Left eye    Disorder of thyroid    hypothyroid    High cholesterol    Personal history of antineoplastic chemotherapy    TCHP 6 cycles (3 completed), Herceptin every 3 weeks (last 2/22/17)      Past Surgical History:   Procedure Laterality Date    Chemotherapy  2016    Dilation/curettage,diagnostic      Lumpectomy right  2017    Other      excision of lipoma at L arm and lower back    Radiation right      2016      Family History   Problem Relation Age of Onset    Diabetes Mother     Heart Disorder Mother         pacmaker    Other (parkinson's disease) Mother     Other (ALS) Father     Breast Cancer Self 61      SOCIAL HISTORY:   Social History     Socioeconomic History    Marital status:     Number of children: 0   Occupational History    Occupation: Polish    Tobacco Use    Smoking status: Some Days     Current packs/day: 0.25     Average packs/day: 0.3 packs/day for 30.0 years (7.5 ttl pk-yrs)     Types: Cigarettes    Smokeless tobacco: Never    Tobacco comments:     plan to quit   Vaping Use    Vaping status: Never Used   Substance and Sexual Activity    Alcohol use: No    Drug use: No   Other Topics Concern    Caffeine Concern Yes     Comment:  2 cups of coffee daily     Occ: retired : no      REVIEW OF SYSTEMS:   GENERAL: feels well otherwise  SKIN: denies any unusual skin lesions  EYES: denies blurred vision or double vision myopia  HEENT: denies nasal congestion, sinus pain or ST  LUNGS: denies shortness of breath with exertion  CARDIOVASCULAR: denies chest pain on exertion  GI: denies abdominal pain, denies heartburn  : denies dysuria, vaginal discharge or itching, no complaint of urinary incontinence   MUSCULOSKELETAL: lumbar back pain  NEURO: denies headaches  PSYCHE: denies depression or anxiety  HEMATOLOGIC: denies hx of anemia  ENDOCRINE: hypothyroid history  ALL/ASTHMA: denies hx of allergy or asthma    EXAM:   /80   Pulse 72   Ht 5' 6\" (1.676 m)   Wt 154 lb (69.9 kg)   SpO2 96%   BMI 24.86 kg/m²      >   Wt Readings from Last 6 Encounters:   08/09/24 154 lb (69.9 kg)   06/17/24 155 lb 12.8 oz (70.7 kg)   04/06/24 153 lb (69.4 kg)   02/02/24 150 lb (68 kg)   10/13/23 155 lb (70.3 kg)   06/15/23 154 lb (69.9 kg)       >   BP Readings from Last 3 Encounters:   08/09/24 160/80   06/17/24 (!) 165/81   04/06/24 130/70       GENERAL: well developed, well nourished, in no apparent distress  SKIN: no rashes, no suspicious lesions  HEENT: atraumatic, normocephalic, ears and throat are clear   Hearing intact  EYES: PERRLA, EOMI, conjunctiva are clear              NECK: supple, no adenopathy, no bruits  CHEST: no chest tenderness  BREAST: no masses, no discharge or no axillary lymphadenopathy  scar tissue in the riight lateral breast  LUNGS: clear to auscultation  CARDIO: RRR without murmur  GI: good BS's, no masses, HSM or tenderness  : deferred  RECTAL: deferred  MUSCULOSKELETAL: back is not tender, FROM of the back  EXTREMITIES: no cyanosis, clubbing or edema.  NEURO: Oriented times three, cranial nerves are intact, motor and sensory are grossly intact    ASSESSMENT AND OTHER RELEVANT CHRONIC CONDITIONS:   Nicole Mason is a 69  year old female who presents for a Medicare Assessment.     PLAN SUMMARY:   Diagnoses and all orders for this visit:    Medicare annual wellness visit, subsequent  -     CBC [6399] [Q]    Hypothyroidism, unspecified type  -     TSH W REFLEX TO FREE T4 [32459][Q]    Mixed hyperlipidemia  -     Comp Metabolic Panel (14) [E]  -     LIPID PANEL [2330] [Q]    Primary hypertension         The patient indicates understanding of these issues and agrees to the plan.  The patient is asked to return in 12m for fu.       PREVENTATIVE SERVICES  INDICATIONS AND SCHEDULE Internal Lab or Procedure External Lab or Procedure   Diabetes Screening      HbgA1C   Annually No results found for: \"A1C\"      No data to display                Fasting Blood Sugar (FSB)Annually No results found for: \"GLUCOSE\"    Cardiovascular Disease Screening     LDL Annually LDL-CHOLESTEROL (mg/dL (calc))   Date Value   04/06/2024 90        EKG One Time     Colorectal Cancer Screening      Colonoscopy Screen every 10 years Health Maintenance   Topic Date Due    Colorectal Cancer Screening  Never done    Update Health Maintenance if applicable    Flex Sigmoidoscopy Screen every 10 years No results found for this or any previous visit.      No data to display                 Fecal Occult Blood Annually No results found for: \"FOB\"      No data to display                Glaucoma Screening      Ophthalmology Visit Annually     Bone Density Screening      Dexascan Every two years Last Dexa Scan:    XR DEXA BONE DENSITOMETRY (CPT=77080) 07/20/2021        No data to display                Pap and Pelvic      Pap  Annually if high risk No recommendations at this time Update Health Maintenance if applicable    Pap  Every two years No recommendations at this time Update Health Maintenance if applicable    Chlamydia  Annually if high risk No results found for: \"CHLAMYDIA\"      No data to display                Screening Mammogram      Mammogram  Annually Health  Maintenance   Topic Date Due    Mammogram  06/03/2025    Update Health Maintenance if applicable   Immunizations      Influenza No orders found for this or any previous visit. Update Immunization Activity if applicable    Pneumococcal No orders found for this or any previous visit. Update Immunization Activity if applicable    Hepatitis B No orders found for this or any previous visit. Update Immunization Activity if applicable    Tetanus No orders found for this or any previous visit. Update Immunization Activity if applicable        SPECIFIC DISEASE MONITORING Internal Lab or Procedure External Lab or Procedure   Annual Monitoring of Persistent     Medications (ACE/ARB, digoxin diuretics, anticonvulsants.)    Potassium  Annually POTASSIUM (mmol/L)   Date Value   04/06/2024 4.7         No data to display                Creatinine  Annually CREATININE (mg/dL)   Date Value   04/06/2024 0.65         No data to display                BUN  Annually UREA NITROGEN (BUN) (mg/dL)   Date Value   04/06/2024 12         No data to display                 Drug Serum Conc  Annually No results found for: \"DIGOXIN\", \"DIG\", \"VALP\"      No data to display                Diabetes      HgbA1C  Annually No results found for: \"A1C\"      No data to display                Creat/alb ratio  Annually      LDL  Annually LDL-CHOLESTEROL (mg/dL (calc))   Date Value   04/06/2024 90         No data to display                 Dilated Eye exam  Annually      No data to display                   No data to display                COPD      Spirometry Testing Annually No results found for this or any previous visit.      No data to display                  SCREENING SCHEDULE - FEMALE      SCREEN COVERAGE SCHEDULE  (If Indicated) LAST DONE   Dexa If at Risk q2 years    Lipids All Patients q5 years LDL-CHOLESTEROL (mg/dL (calc))   Date Value   04/06/2024 90      Colonoscopy All Patients q10 years Health Maintenance   Topic Date Due    Colorectal Cancer  Screening  Never done      FBS All Patients q1 year No results found for: \"GLUCOSE\"   Glaucoma If at high risk q1 year    Pap If at high risk q1 year No recommendations at this time   Pap All Patients q2 year if indicated No recommendations at this time   Mammogram Age > 39 q1 year Health Maintenance   Topic Date Due    Mammogram  06/03/2025      EKG All Patients One at initial exam    Vaccines:      Pneumococcal All Patients Once per lifetime No orders found for this or any previous visit.   Influenza All Patients Annually No orders found for this or any previous visit.   Hepatitis B If at Risk 3 scheduled doses No orders found for this or any previous visit.         SUGGESTED VACCINATIONS - Influenza, Pneumococcal, Zoster, Tetanus     Immunization History   Administered Date(s) Administered    Covid-19 Vaccine Pfizer 30 mcg/0.3 ml 03/24/2021, 04/14/2021    FLU VAC High Dose 65 YRS & Older PRSV Free (35608) 10/13/2023    FLULAVAL 6 months & older 0.5 ml Prefilled syringe (44407) 10/15/2020, 10/14/2021    FLUZONE 6 months and older PFS 0.5 ml (31227) 09/19/2016, 09/06/2017         History/Other:    Chief Complaint Reviewed and Verified  No Further Nursing Notes to   Review  Medications Reviewed  Problem List Reviewed         Tobacco:  Social History     Tobacco Use   Smoking Status Some Days    Current packs/day: 0.25    Average packs/day: 0.3 packs/day for 30.0 years (7.5 ttl pk-yrs)    Types: Cigarettes   Smokeless Tobacco Never   Tobacco Comments    plan to quit     E-Cigarettes/Vaping       Questions Responses    E-Cigarette Use Never User           Tobacco cessation counseling for 3-10 minutes (add E/M code #59797).      Current Outpatient Medications   Medication Sig Dispense Refill    diclofenac 75 MG Oral Tab EC Take 1 tablet (75 mg total) by mouth 2 (two) times daily. 60 tablet 3    levothyroxine 112 MCG Oral Tab Take 1 tablet (112 mcg total) by mouth before breakfast. 90 tablet 3    clobetasol 0.05 %  External Cream Apply 1 Application topically 2 (two) times daily. 30 g 2    albuterol 108 (90 Base) MCG/ACT Inhalation Aero Soln Inhale 2 puffs into the lungs every 6 (six) hours as needed for Wheezing. 1 each 3    losartan 25 MG Oral Tab Take 1 tablet (25 mg total) by mouth daily. 90 tablet 3    rosuvastatin 20 MG Oral Tab Take 1 tablet (20 mg total) by mouth nightly. 90 tablet 3    gabapentin 300 MG Oral Cap TAKE 1 CAPSULE BY MOUTH TWO TIMES DAILY 90 capsule 3    Mometasone Furo-Formoterol Fum (DULERA) 100-5 MCG/ACT Inhalation Aerosol Inhale 2 puffs into the lungs 2 (two) times a day. 1 each 11    Vitamin D3 1000 UNITS Oral Tab Take 1 tablet (1,000 Units total) by mouth daily.      Coenzyme Q10 (COQ10 OR) Take by mouth daily.        LUTEIN-ZEAXANTHIN OR Take by mouth daily.             Review of Systems:  Review of Systems      Objective:   /80   Pulse 72   Ht 5' 6\" (1.676 m)   Wt 154 lb (69.9 kg)   SpO2 96%   BMI 24.86 kg/m²  Estimated body mass index is 24.86 kg/m² as calculated from the following:    Height as of this encounter: 5' 6\" (1.676 m).    Weight as of this encounter: 154 lb (69.9 kg).  Physical Exam      Assessment & Plan:   1. Medicare annual wellness visit, subsequent (Primary) check fasting labs  -     CBC With Differential With Platelet  2. Hypothyroidism, unspecified type on levothyroxine 112 mcg  -     TSH W Reflex To Free T4   3. Mixed hyperlipidemia crestor  -     Comp Metabolic Panel (14)  -     Lipid Panel  4. Primary hypertension Losartan 25mg         No follow-ups on file.    Kelsie Erazo MD, 8/9/2024, 10:43 AM

## 2024-08-10 LAB
ABSOLUTE BASOPHILS: 47 CELLS/UL (ref 0–200)
ABSOLUTE EOSINOPHILS: 172 CELLS/UL (ref 15–500)
ABSOLUTE LYMPHOCYTES: 1373 CELLS/UL (ref 850–3900)
ABSOLUTE MONOCYTES: 819 CELLS/UL (ref 200–950)
ABSOLUTE NEUTROPHILS: 5390 CELLS/UL (ref 1500–7800)
ALBUMIN/GLOBULIN RATIO: 1.6 (CALC) (ref 1–2.5)
ALBUMIN: 4.2 G/DL (ref 3.6–5.1)
ALKALINE PHOSPHATASE: 81 U/L (ref 37–153)
ALT: 9 U/L (ref 6–29)
AST: 16 U/L (ref 10–35)
BASOPHILS: 0.6 %
BILIRUBIN, TOTAL: 0.4 MG/DL (ref 0.2–1.2)
BUN: 11 MG/DL (ref 7–25)
CALCIUM: 9.6 MG/DL (ref 8.6–10.4)
CARBON DIOXIDE: 25 MMOL/L (ref 20–32)
CHLORIDE: 100 MMOL/L (ref 98–110)
CHOL/HDLC RATIO: 2.2 (CALC)
CHOLESTEROL, TOTAL: 182 MG/DL
CREATININE: 0.61 MG/DL (ref 0.5–1.05)
EGFR: 98 ML/MIN/1.73M2
EOSINOPHILS: 2.2 %
GLOBULIN: 2.7 G/DL (CALC) (ref 1.9–3.7)
GLUCOSE: 100 MG/DL (ref 65–99)
HDL CHOLESTEROL: 82 MG/DL
HEMATOCRIT: 43.9 % (ref 35–45)
HEMOGLOBIN: 14.3 G/DL (ref 11.7–15.5)
LDL-CHOLESTEROL: 83 MG/DL (CALC)
LYMPHOCYTES: 17.6 %
MCH: 31.2 PG (ref 27–33)
MCHC: 32.6 G/DL (ref 32–36)
MCV: 95.6 FL (ref 80–100)
MONOCYTES: 10.5 %
MPV: 10.4 FL (ref 7.5–12.5)
NEUTROPHILS: 69.1 %
NON-HDL CHOLESTEROL: 100 MG/DL (CALC)
PLATELET COUNT: 306 THOUSAND/UL (ref 140–400)
POTASSIUM: 4.8 MMOL/L (ref 3.5–5.3)
PROTEIN, TOTAL: 6.9 G/DL (ref 6.1–8.1)
RDW: 13.8 % (ref 11–15)
RED BLOOD CELL COUNT: 4.59 MILLION/UL (ref 3.8–5.1)
SODIUM: 136 MMOL/L (ref 135–146)
TRIGLYCERIDES: 78 MG/DL
TSH W/REFLEX TO FT4: 2.65 MIU/L (ref 0.4–4.5)
WHITE BLOOD CELL COUNT: 7.8 THOUSAND/UL (ref 3.8–10.8)

## 2024-12-24 ENCOUNTER — OFFICE VISIT (OUTPATIENT)
Dept: INTERNAL MEDICINE CLINIC | Facility: CLINIC | Age: 69
End: 2024-12-24
Payer: MEDICARE

## 2024-12-24 VITALS
WEIGHT: 157 LBS | BODY MASS INDEX: 25.23 KG/M2 | HEIGHT: 66 IN | OXYGEN SATURATION: 97 % | SYSTOLIC BLOOD PRESSURE: 140 MMHG | HEART RATE: 67 BPM | DIASTOLIC BLOOD PRESSURE: 60 MMHG

## 2024-12-24 DIAGNOSIS — Z12.11 COLON CANCER SCREENING: ICD-10-CM

## 2024-12-24 DIAGNOSIS — Z23 NEEDS FLU SHOT: ICD-10-CM

## 2024-12-24 DIAGNOSIS — R05.3 CHRONIC COUGH: Primary | ICD-10-CM

## 2024-12-24 DIAGNOSIS — M72.2 PLANTAR FASCIITIS OF RIGHT FOOT: ICD-10-CM

## 2024-12-24 RX ORDER — GABAPENTIN 300 MG/1
CAPSULE ORAL
Qty: 90 CAPSULE | Refills: 3 | Status: SHIPPED | OUTPATIENT
Start: 2024-12-24

## 2024-12-24 RX ORDER — ROSUVASTATIN CALCIUM 20 MG/1
20 TABLET, COATED ORAL NIGHTLY
Qty: 90 TABLET | Refills: 3 | Status: SHIPPED | OUTPATIENT
Start: 2024-12-24

## 2024-12-24 RX ORDER — AZITHROMYCIN 250 MG/1
TABLET, FILM COATED ORAL
Qty: 6 TABLET | Refills: 0 | Status: SHIPPED | OUTPATIENT
Start: 2024-12-24 | End: 2024-12-29

## 2024-12-24 RX ORDER — METHYLPREDNISOLONE ACETATE 40 MG/ML
40 INJECTION, SUSPENSION INTRA-ARTICULAR; INTRALESIONAL; INTRAMUSCULAR; SOFT TISSUE ONCE
Status: COMPLETED | OUTPATIENT
Start: 2024-12-24 | End: 2024-12-24

## 2024-12-24 NOTE — PROGRESS NOTES
Subjective:   Nicole Mason is a 69 year old female who presents for Cough     Patient here for fu after a prolonged upper respiratory infection which was viral.  Finally is over the symptoms. Would like to get flu vaccine now.  Also fu on right plantar fasceitis.  Requesting another steroid injection.  History/Other:    Chief Complaint Reviewed and Verified  No Further Nursing Notes to   Review  Medications Reviewed  Problem List Reviewed         Tobacco:smoker  Social History     Tobacco Use   Smoking Status Some Days    Current packs/day: 0.25    Average packs/day: 0.3 packs/day for 30.0 years (7.5 ttl pk-yrs)    Types: Cigarettes   Smokeless Tobacco Never   Tobacco Comments    plan to quit     E-Cigarettes/Vaping       Questions Responses    E-Cigarette Use Never User           Tobacco cessation counseling For >10 minutes (add E/M code #25532).      Current Outpatient Medications   Medication Sig Dispense Refill    rosuvastatin 20 MG Oral Tab Take 1 tablet (20 mg total) by mouth nightly. 90 tablet 3    gabapentin 300 MG Oral Cap TAKE 1 CAPSULE BY MOUTH TWO TIMES DAILY 90 capsule 3    amLODIPine 5 MG Oral Tab Take 1 tablet (5 mg total) by mouth daily. 90 tablet 3    losartan 50 MG Oral Tab Take 1 tablet (50 mg total) by mouth daily. 90 tablet 3    ciprofloxacin 500 MG Oral Tab Take 1 tablet (500 mg total) by mouth 2 (two) times daily. 20 tablet 3    diclofenac 75 MG Oral Tab EC Take 1 tablet (75 mg total) by mouth 2 (two) times daily. 60 tablet 3    levothyroxine 112 MCG Oral Tab Take 1 tablet (112 mcg total) by mouth before breakfast. 90 tablet 3    clobetasol 0.05 % External Cream Apply 1 Application topically 2 (two) times daily. 30 g 2    albuterol 108 (90 Base) MCG/ACT Inhalation Aero Soln Inhale 2 puffs into the lungs every 6 (six) hours as needed for Wheezing. 1 each 3    Mometasone Furo-Formoterol Fum (DULERA) 100-5 MCG/ACT Inhalation Aerosol Inhale 2 puffs into the lungs 2 (two) times a day. 1  each 11    Vitamin D3 1000 UNITS Oral Tab Take 1 tablet (1,000 Units total) by mouth daily.      Coenzyme Q10 (COQ10 OR) Take by mouth daily.        LUTEIN-ZEAXANTHIN OR Take by mouth daily.             Review of Systems:  Review of Systems   Respiratory:  Negative for cough, shortness of breath and wheezing.    Cardiovascular:  Negative for chest pain, palpitations and leg swelling.   Musculoskeletal:  Positive for myalgias (right planta r fscia).         Objective:   /60   Pulse 67   Ht 5' 6\" (1.676 m)   Wt 157 lb (71.2 kg)   SpO2 97%   BMI 25.34 kg/m²  Estimated body mass index is 25.34 kg/m² as calculated from the following:    Height as of this encounter: 5' 6\" (1.676 m).    Weight as of this encounter: 157 lb (71.2 kg).  Physical Exam  Constitutional:       Appearance: Normal appearance.   HENT:      Head: Normocephalic and atraumatic.      Right Ear: Ear canal normal.      Left Ear: Ear canal normal.      Mouth/Throat:      Comments: Post nasal drip  Eyes:      General: No scleral icterus.     Pupils: Pupils are equal, round, and reactive to light.   Cardiovascular:      Rate and Rhythm: Normal rate and regular rhythm.   Pulmonary:      Effort: Pulmonary effort is normal.      Breath sounds: Normal breath sounds.   Abdominal:      Palpations: Abdomen is soft.      Tenderness: There is no abdominal tenderness.   Musculoskeletal:         General: Tenderness (mid right plantar fascia on right foot) present.      Cervical back: Neck supple.      Right lower leg: No edema.      Left lower leg: No edema.   Lymphadenopathy:      Cervical: No cervical adenopathy.   Neurological:      Mental Status: She is alert.   Psychiatric:         Thought Content: Thought content normal.           Assessment & Plan:   1. Chronic cough (Primary)resolved clinically  2. Colon cancer screening Gastro referral  3. Plantar fasciitis of right foot - Injected trigger point with 40 mg od depo medrol  Other orders  -      Rosuvastatin Calcium; Take 1 tablet (20 mg total) by mouth nightly.  Dispense: 90 tablet; Refill: 3  -     Gabapentin; TAKE 1 CAPSULE BY MOUTH TWO TIMES DAILY  Dispense: 90 capsule; Refill: 3        No follow-ups on file.    Kelsie Erazo MD, 12/24/2024, 10:40 AM

## 2025-01-03 ENCOUNTER — NURSE ONLY (OUTPATIENT)
Facility: CLINIC | Age: 70
End: 2025-01-03

## 2025-01-03 DIAGNOSIS — Z12.11 SCREENING FOR COLON CANCER: Primary | ICD-10-CM

## 2025-01-03 RX ORDER — ZINC SULFATE 50(220)MG
220 CAPSULE ORAL DAILY
COMMUNITY

## 2025-01-03 RX ORDER — SOD SULF/POT CHLORIDE/MAG SULF 1.479 G
24 TABLET ORAL AS DIRECTED
Qty: 24 TABLET | Refills: 0 | Status: SHIPPED | OUTPATIENT
Start: 2025-01-03 | End: 2025-01-04

## 2025-01-03 NOTE — PROGRESS NOTES
Scheduled for:  Colonoscopy 84841  Provider Name:  Dr Ellis  Date:  4/15/2025  Location:  [facility] due to Medicaid  Sedation:  MAC  Time:  8:10 am. (pt is aware that ENDO will call the day before the procedure to confirm arrival time)  Prep:  Patient requesting Sutab bowel prep.  Meds/Allergies Reconciled?:  yes  Diagnosis with codes:    CRC screening Z12.11  Was patient informed to call insurance with codes (Y/N):  Yes  Referral sent?:  Referral was sent at the time of electronic surgical scheduling.  EM or [facility] notified?:  I sent an electronic request to Endo Scheduling and received a confirmation today.  Medication Orders:  Patient is aware to NOT take iron pills, herbal meds and diet supplements for 7 days before exam. Also to NOT take any form of alcohol, recreational drugs and any forms of ED meds 24-72 hours before exam.   Misc Orders:  N/A   Further instructions given by staff:  I discussed the prep instructions with the patient which she verbally understood. Patient is aware I will be sending prep instructions via My Chart once MD confirms bowel prep.

## 2025-01-03 NOTE — PROGRESS NOTES
Called patient for scheduled telephone colon screening/positive FIT result.   Medications, pharmacy, and allergies verified with the patient.   Please advise on colonoscopy and bowel prep orders.     Age 45-66 y/o (Y/N):   66-76 y/o ? Route to GI provider per rotation schedule   › GI MD preference:   › Insurance:  Medicare  › Last PCP Visit: 12/24/2024  IF NO PCP within [facility] ? GI in-person consultation   › Last CBC drawn: 8/9/2024  › Date of positive FIT test: N/A  › H/W/BMI: 5'6\"/157 lb/25.35    Special comments/notes:  Telephone Colon Screening Questionnaire Yes No   Are you currently experiencing any new/abnormal GI symptoms? [] [x]   If yes, explain:     Rectal bleeding? [] [x]   Black stool? [] [x]   Dysphagia or food \"feeling stuck\" when eating? [] [x]   Intractable vomiting? [] [x]   Unexplained weight loss? [] [x]   First colonoscopy? [x] []   Family history of colon cancer? [] [x]   Any issues with anesthesia? [] [x]   If yes, explain:      Have you had a stroke, heart attack or stent placement in the last 12 months?  [] [x]   If yes ? GI in-person consultation      Personal history of resp. Issues/oxygen use/ARMAAN/COPD [] [x]   If yes, CPAP/BiPAP?     History of devices (pacemaker/defibrillator) [] [x]   History of cardiac/CVA issues/(MI/stroke) (see above) [] [x]     Medications Yes  No   Anticoagulants  Anticoagulant (Except Aspirin) ? route to RN pool to request adjustments from prescribing provider  [] [x]   Diabetic Meds  PO ? hold DAY PRIOR and DAY OF procedure  Insulin ? route to RN pool to request adjustments from prescribing provider  [] [x]   Weight loss meds (Phentermine/Vyvanse/Saxsenda/etc): [] [x]   Iron/herbal/multivitamin supplement (RX/OTC): [x] []   Usage of marijuana, CBD &/or vape products: [] [x]

## 2025-01-03 NOTE — PROGRESS NOTES
Dr Ellis,    Patient is scheduled for Colonoscopy procedure on 4/16/2025 and is requesting Sutab bowel prep instead of Golyetely. Please review and advise.    Thank you!

## 2025-01-03 NOTE — PROGRESS NOTES
GI Staff:  [facility] Colon Screening Orders    Please schedule: Colonoscopy [id number] with MAC OR IV (if appropriate)     Patient requesting Sutab bowel prep    Diagnosis: Colon Screening Z12.11     Medication adjustments: Hold vitamins for 7 days prior to the procedure.       >>>Please inform patient if new medications are started after scheduling procedure they need to call clinic to notify us.

## 2025-03-26 ENCOUNTER — TELEPHONE (OUTPATIENT)
Facility: CLINIC | Age: 70
End: 2025-03-26

## 2025-03-27 NOTE — TELEPHONE ENCOUNTER
GI schedulers: pt will need to be rescheduled on 4/15 as I will need half the day blocked. Please reschedule to any open space in my schedule or to Dr. Zavala's schedule - he has ope [facility] time that this pt should be appropriate for clinically (not sure on insurance). If unable to accommodate at [facility], could consider using Dr. Zavala's open space 4/10 or 4/11. Thanks.

## 2025-04-24 RX ORDER — ONDANSETRON 4 MG/1
4 TABLET, FILM COATED ORAL EVERY 8 HOURS PRN
Qty: 20 TABLET | Refills: 2 | Status: SHIPPED | OUTPATIENT
Start: 2025-04-24

## 2025-05-06 ENCOUNTER — TELEPHONE (OUTPATIENT)
Facility: CLINIC | Age: 70
End: 2025-05-06

## 2025-05-06 NOTE — TELEPHONE ENCOUNTER
Patient has questions regarding preparation instructions for 5/8/2025 colonoscopy.  Please call.  Thank you.

## 2025-05-06 NOTE — TELEPHONE ENCOUNTER
Returned call to patient. Reviewed her colonoscopy prep instructions and answered all her questions.

## 2025-05-08 ENCOUNTER — TELEPHONE (OUTPATIENT)
Facility: CLINIC | Age: 70
End: 2025-05-08

## 2025-05-08 NOTE — TELEPHONE ENCOUNTER
Late entry - notified by endoscopy charge RN that pt's friend called this morning. Notes pt with runny nose, cough, feeling very weak. BP was 75/40 and pt was slow to respond. Recommended canceling procedure and going to the ED for immediate evaluation. I discussed this w/ pt's friend. They wanted to first hear back from pt's PCP. Recommended if BP remains low or pt is not responding, to go to the ED before hearing back from PCP.    Initial Dietitian Evaluation 2/2 to extended length of stay. Patient is a 85M with a past  medical Hx of pAfib not on AC, HTN, Seizure d/o, SDH s/p R craniotomy, ETOH abuse who presents with R sided arm weakness, found to have acute CVA.      RDN met wit patient at bedside. He reports good PO intake, has been eating >75% meals.  No GI distress (nausea/vomiting/diarrhea/constipation.) Also, No reports of chewing or swallowing difficulties with current diet, Mechanical Soft with Thin liquids.  Stated UBW is 77.1 kgs, weight obtained on this admission is consistent with stated UBW. Of note, Patient is A+O x2 and this is a limited historian. Initial Dietitian Evaluation 2/2 to extended length of stay. Patient is a 85M with a past  medical Hx of pAfib not on AC, HTN, Seizure d/o, SDH s/p R craniotomy, ETOH abuse who presents with R sided arm weakness, found to have acute CVA.  RDN met wit patient at bedside. He reports good PO intake, has been eating >75% meals.  No GI distress (nausea/vomiting/diarrhea/constipation.) Also, No reports of chewing or swallowing difficulties with current diet, Mechanical Soft with Thin liquids.  Stated UBW is 77.1 kgs, weight obtained on this admission 78.1kg is consistent with stated UBW. Of note, Patient is A+O x2 and a limited historian.  PO intake encouraged, RD remains available- re-consult as needed.

## 2025-05-24 ENCOUNTER — OFFICE VISIT (OUTPATIENT)
Dept: INTERNAL MEDICINE CLINIC | Facility: CLINIC | Age: 70
End: 2025-05-24
Payer: MEDICARE

## 2025-05-24 VITALS
HEART RATE: 83 BPM | WEIGHT: 154 LBS | SYSTOLIC BLOOD PRESSURE: 132 MMHG | DIASTOLIC BLOOD PRESSURE: 86 MMHG | HEIGHT: 66 IN | OXYGEN SATURATION: 99 % | BODY MASS INDEX: 24.75 KG/M2

## 2025-05-24 DIAGNOSIS — Z01.00 ENCOUNTER FOR COMPLETE EYE EXAM: ICD-10-CM

## 2025-05-24 DIAGNOSIS — I10 PRIMARY HYPERTENSION: ICD-10-CM

## 2025-05-24 DIAGNOSIS — E03.9 HYPOTHYROIDISM, UNSPECIFIED TYPE: ICD-10-CM

## 2025-05-24 DIAGNOSIS — Z00.00 MEDICARE ANNUAL WELLNESS VISIT, SUBSEQUENT: Primary | ICD-10-CM

## 2025-05-24 DIAGNOSIS — E78.2 MIXED HYPERLIPIDEMIA: ICD-10-CM

## 2025-05-24 NOTE — PROGRESS NOTES
Subjective:   Nicole Mason is a 69 year old female who presents for Annual     Patient here for a Ma supervisit and fu on htn hx of breast cancer, hypothyroidism and hyperlipidemia.  Is five years out from breast cancer  HPI:   Nicole Mason is a 69 year old female who presents for a MA Supervisit.    Problem List[1]    General Health     In the past six months, have you lost more than 10 pounds without trying?: 2 - No    Has your appetite been poor?: No    Type of Diet: Balanced    How does the patient maintain a good energy level?: Appropriate Exercise, Daily Walks    How would you describe your daily physical activity?: Moderate    How would you describe your current health state?: Good    How do you maintain positive mental well-being?: Social Interaction, Visiting Friends, Visiting Family         Have you had any immunizations at another office such as Influenza, Hepatitis B, Tetanus, or Pneumococcal?: No     Functional Ability     Bathing or Showering: Able without help    Toileting: Able without help    Dressing: Able without help    Eating: Able without help    Driving: Able without help    Preparing your meals: Able without help    Managing money/bills: Able without help    Taking medications as prescribed: Able without help    Are you able to afford your medications?: Yes    Hearing Problems?: No     Functional Status     Hearing Problems?: No    Vision Problems? : No    Difficulty walking?: No    Difficulty dressing or bathing?: No    Problems with daily activities? : No    Memory Problems?: No      Fall/Risk Assessment                                                              Depression Screening (PHQ-2/PHQ-9): Over the LAST 2 WEEKS                      Advance Directives     Do you have a healthcare power of ?: No    Do you have a living will?: No   Was Medicare Assessment Questionnaire completed by patient and sent to HIM for scanning?Yes    Please go to \"Cognitive Assessment\" under  Medicare Assessment section in Charting, test patient and document.    Then, refresh your progress note to see your input here.  Cognitive Assessment     What day of the week is this?: Correct    What month is it?: Correct    What year is it?: Correct    Recall \"Ball\": Correct    Recall \"Flag\": Correct    Recall \"Tree\": Correct      ALLERGIES:   Allergies[2]    CURRENT MEDICATIONS:   Current Medications[3]   MEDICAL INFORMATION:   Past Medical History[4]   Past Surgical History[5]   Family History[6]   SOCIAL HISTORY:   Short Social Hx on File[7]  Occ: retired : no      REVIEW OF SYSTEMS:   GENERAL: feels well otherwise  SKIN: denies any unusual skin lesions  EYES: denies blurred vision or double vision myopia  HEENT: denies nasal congestion, sinus pain or ST  LUNGS: denies shortness of breath with exertion  CARDIOVASCULAR: denies chest pain on exertion  GI: denies abdominal pain, denies heartburn  : denies dysuria, vaginal discharge or itching, no complaint of urinary incontinence   MUSCULOSKELETAL: denies back pain  NEURO: denies headaches  PSYCHE: denies depression or anxiety  HEMATOLOGIC: denies hx of anemia  ENDOCRINE:hypo thyroid history  ALL/ASTHMA: denies hx of allergy or asthma    EXAM:   /86   Pulse 83   Ht 5' 6\" (1.676 m)   Wt 154 lb (69.9 kg)   SpO2 99%   BMI 24.86 kg/m²      >   Wt Readings from Last 6 Encounters:   05/24/25 154 lb (69.9 kg)   04/30/25 151 lb (68.5 kg)   12/24/24 157 lb (71.2 kg)   08/09/24 154 lb (69.9 kg)   06/17/24 155 lb 12.8 oz (70.7 kg)   04/06/24 153 lb (69.4 kg)       >   BP Readings from Last 3 Encounters:   05/24/25 132/86   12/24/24 140/60   08/09/24 140/80       GENERAL: well developed, well nourished, in no apparent distress  SKIN: no rashes, no suspicious lesions  HEENT: atraumatic, normocephalic, ears and throat are clear   Hearing intact  EYES: PERRLA, EOMI, conjunctiva are clear  Right Eye Visual Acuity: Corrected Left Eye Visual Acuity: Corrected    Right Eye Chart Acuity: 20/20 Left Eye Chart Acuity: 20/20   NECK: supple, no adenopathy, no bruits  CHEST: no chest tenderness  BREAST: no masses, no discharge or no axillary lymphadenopathy no palapable masses or nipple anomaly  LUNGS: clear to auscultation  CARDIO: RRR without murmur  GI: good BS's, no masses, HSM or tenderness  : deferred  RECTAL: deferred  MUSCULOSKELETAL: back is not tender, FROM of the back  EXTREMITIES: no cyanosis, clubbing or edema.  NEURO: Oriented times three, cranial nerves are intact, motor and sensory are grossly intact    ASSESSMENT AND OTHER RELEVANT CHRONIC CONDITIONS:   Nicole Mason is a 69 year old female who presents for a Medicare Assessment.     PLAN SUMMARY:     Diagnoses and all orders for this visit:    Medicare annual wellness visit, subsequent    Hypothyroidism, unspecified type    Mixed hyperlipidemia    Primary hypertension         The patient indicates understanding of these issues and agrees to the plan.  The patient is asked to return in 12m for fu.       PREVENTATIVE SERVICES  INDICATIONS AND SCHEDULE Internal Lab or Procedure External Lab or Procedure   Diabetes Screening      HbgA1C   Annually No results found for: \"A1C\"      No data to display                Fasting Blood Sugar (FSB)Annually No results found for: \"GLUCOSE\"    Cardiovascular Disease Screening     LDL Annually LDL-CHOLESTEROL (mg/dL (calc))   Date Value   08/09/2024 83        EKG One Time     Colorectal Cancer Screening      Colonoscopy Screen every 10 years Health Maintenance   Topic Date Due    Colorectal Cancer Screening  Never done    Update Health Maintenance if applicable    Flex Sigmoidoscopy Screen every 10 years No results found for this or any previous visit.      No data to display                 Fecal Occult Blood Annually No results found for: \"FOB\"      No data to display                Glaucoma Screening      Ophthalmology Visit Annually     Bone Density Screening       Dexascan Every two years Last Dexa Scan:    XR DEXA BONE DENSITOMETRY (CPT=77080) 07/20/2021        No data to display                Pap and Pelvic      Pap  Annually if high risk No recommendations at this time Update Health Maintenance if applicable    Pap  Every two years No recommendations at this time Update Health Maintenance if applicable    Chlamydia  Annually if high risk No results found for: \"CHLAMYDIA\"      No data to display                Screening Mammogram      Mammogram  Annually Health Maintenance   Topic Date Due    Mammogram  06/03/2025    Update Health Maintenance if applicable   Immunizations      Influenza No orders found for this or any previous visit. Update Immunization Activity if applicable    Pneumococcal No orders found for this or any previous visit. Update Immunization Activity if applicable    Hepatitis B No orders found for this or any previous visit. Update Immunization Activity if applicable    Tetanus No orders found for this or any previous visit. Update Immunization Activity if applicable        SPECIFIC DISEASE MONITORING Internal Lab or Procedure External Lab or Procedure   Annual Monitoring of Persistent     Medications (ACE/ARB, digoxin diuretics, anticonvulsants.)    Potassium  Annually POTASSIUM (mmol/L)   Date Value   08/09/2024 4.8         No data to display                Creatinine  Annually CREATININE (mg/dL)   Date Value   08/09/2024 0.61         No data to display                BUN  Annually UREA NITROGEN (BUN) (mg/dL)   Date Value   08/09/2024 11         No data to display                 Drug Serum Conc  Annually No results found for: \"DIGOXIN\", \"DIG\", \"VALP\"      No data to display                Diabetes      HgbA1C  Annually No results found for: \"A1C\"      No data to display                Creat/alb ratio  Annually      LDL  Annually LDL-CHOLESTEROL (mg/dL (calc))   Date Value   08/09/2024 83         No data to display                 Dilated Eye exam   Annually      No data to display                   No data to display                COPD      Spirometry Testing Annually No results found for this or any previous visit.      No data to display                  SCREENING SCHEDULE - FEMALE      SCREEN COVERAGE SCHEDULE  (If Indicated) LAST DONE   Dexa If at Risk q2 years    Lipids All Patients q5 years LDL-CHOLESTEROL (mg/dL (calc))   Date Value   08/09/2024 83      Colonoscopy All Patients q10 years Health Maintenance   Topic Date Due    Colorectal Cancer Screening  Never done      FBS All Patients q1 year No results found for: \"GLUCOSE\"   Glaucoma If at high risk q1 year    Pap If at high risk q1 year No recommendations at this time   Pap All Patients q2 year if indicated No recommendations at this time   Mammogram Age > 39 q1 year Health Maintenance   Topic Date Due    Mammogram  06/03/2025      EKG All Patients One at initial exam    Vaccines:      Pneumococcal All Patients Once per lifetime No orders found for this or any previous visit.   Influenza All Patients Annually No orders found for this or any previous visit.   Hepatitis B If at Risk 3 scheduled doses No orders found for this or any previous visit.         SUGGESTED VACCINATIONS - Influenza, Pneumococcal, Zoster, Tetanus     Immunization History   Administered Date(s) Administered    Covid-19 Vaccine Pfizer 30 mcg/0.3 ml 03/24/2021, 04/14/2021    FLU VAC High Dose 65 YRS & Older PRSV Free (01225) 10/13/2023    FLULAVAL 6 months & older 0.5 ml Prefilled syringe (14050) 10/15/2020, 10/14/2021    FLUZONE 6 months and older PFS 0.5 ml (21279) 09/19/2016, 09/06/2017    High Dose Fluzone Influenza Vaccine, 65yr+ PF 0.5mL (98845) 12/24/2024         History/Other:    Chief Complaint Reviewed and Verified  No Further Nursing Notes to   Review  Allergies Reviewed  Problem List Reviewed         Tobacco: smokes 10 a day  Tobacco Use[8]  E-Cigarettes/Vaping       Questions Responses    E-Cigarette Use Never  User           Tobacco cessation counseling for 3-10 minutes (add E/M code #56834).      Current Medications[9]      Review of Systems:  Review of Systems      Objective:   /86   Pulse 83   Ht 5' 6\" (1.676 m)   Wt 154 lb (69.9 kg)   SpO2 99%   BMI 24.86 kg/m²  Estimated body mass index is 24.86 kg/m² as calculated from the following:    Height as of this encounter: 5' 6\" (1.676 m).    Weight as of this encounter: 154 lb (69.9 kg).  Physical Exam      Assessment & Plan:   1. Medicare annual wellness visit, subsequent (Primary) check fasting labs  2. Hypothyroidism, unspecified type on levothyroxine  3. Mixed hyperlipidemia on crestor  4. Primary hypertension controlled         No follow-ups on file.    Kelsie Erazo MD, 5/24/2025, 11:23 AM        [1]   Patient Active Problem List  Diagnosis    Hypothyroid    History of right breast cancer    Dense breast    Encounter for monitoring cardiotoxic drug therapy    Peripheral neuropathy due to chemotherapy (HCC)    Encounter for care related to Port-a-Cath    Chronic obstructive pulmonary disease with acute exacerbation (HCC)    Encounter for follow-up surveillance of breast cancer    Encounter for medication monitoring    Screening mammogram, encounter for   [2]   Allergies  Allergen Reactions    Penicillins RASH   [3]   Current Outpatient Medications   Medication Sig Dispense Refill    levothyroxine 112 MCG Oral Tab Take 1 tablet (112 mcg total) by mouth before breakfast. 90 tablet 3    ondansetron (ZOFRAN) 4 mg tablet Take 1 tablet (4 mg total) by mouth every 8 (eight) hours as needed for Nausea. 20 tablet 2    multivitamin Oral Chew Tab Chew 1 tablet by mouth in the morning.      zinc sulfate 220 (50 Zn) MG Oral Cap Take 1 capsule (220 mg total) by mouth in the morning.      rosuvastatin 20 MG Oral Tab Take 1 tablet (20 mg total) by mouth nightly. 90 tablet 3    gabapentin 300 MG Oral Cap TAKE 1 CAPSULE BY MOUTH TWO TIMES DAILY (Patient taking differently:  Take 1 capsule (300 mg total) by mouth daily as needed. TAKE 1 CAPSULE BY MOUTH TWO TIMES DAILY) 90 capsule 3    amLODIPine 5 MG Oral Tab Take 1 tablet (5 mg total) by mouth daily. 90 tablet 3    losartan 50 MG Oral Tab Take 1 tablet (50 mg total) by mouth daily. 90 tablet 3    ciprofloxacin 500 MG Oral Tab Take 1 tablet (500 mg total) by mouth 2 (two) times daily. (Patient not taking: Reported on 1/3/2025) 20 tablet 3    diclofenac 75 MG Oral Tab EC Take 1 tablet (75 mg total) by mouth 2 (two) times daily. (Patient not taking: Reported on 1/3/2025) 60 tablet 3    clobetasol 0.05 % External Cream Apply 1 Application topically 2 (two) times daily. (Patient not taking: Reported on 1/3/2025) 30 g 2    albuterol 108 (90 Base) MCG/ACT Inhalation Aero Soln Inhale 2 puffs into the lungs every 6 (six) hours as needed for Wheezing. 1 each 3    Mometasone Furo-Formoterol Fum (DULERA) 100-5 MCG/ACT Inhalation Aerosol Inhale 2 puffs into the lungs 2 (two) times a day. (Patient not taking: Reported on 4/30/2025) 1 each 11   [4]   Past Medical History:   Breast cancer (HCC)    right 2016    Cataract    Left eye    Disorder of thyroid    hypothyroid    Exposure to medical diagnostic radiation    High cholesterol    Personal history of antineoplastic chemotherapy    TCHP 6 cycles (3 completed), Herceptin every 3 weeks (last 2/22/17)    Visual impairment    glasses   [5]   Past Surgical History:  Procedure Laterality Date    Chemotherapy  2016    Dilation/curettage,diagnostic      Lumpectomy right  2017    Other      excision of lipoma at L arm and lower back    Radiation right      2016   [6]   Family History  Problem Relation Age of Onset    Diabetes Mother     Heart Disorder Mother         pacmaker    Other (parkinson's disease) Mother     Other (ALS) Father     Breast Cancer Self 61   [7]   Social History  Socioeconomic History    Marital status:     Number of children: 0   Occupational History    Occupation: Polish     Tobacco Use    Smoking status: Some Days     Current packs/day: 0.25     Average packs/day: 0.3 packs/day for 30.0 years (7.5 ttl pk-yrs)     Types: Cigarettes    Smokeless tobacco: Never    Tobacco comments:     plan to quit   Vaping Use    Vaping status: Never Used   Substance and Sexual Activity    Alcohol use: No    Drug use: No   Other Topics Concern    Caffeine Concern Yes     Comment: 2 cups of coffee daily   [8]   Social History  Tobacco Use   Smoking Status Some Days    Current packs/day: 0.25    Average packs/day: 0.3 packs/day for 30.0 years (7.5 ttl pk-yrs)    Types: Cigarettes   Smokeless Tobacco Never   Tobacco Comments    plan to quit   [9]   Current Outpatient Medications   Medication Sig Dispense Refill    levothyroxine 112 MCG Oral Tab Take 1 tablet (112 mcg total) by mouth before breakfast. 90 tablet 3    ondansetron (ZOFRAN) 4 mg tablet Take 1 tablet (4 mg total) by mouth every 8 (eight) hours as needed for Nausea. 20 tablet 2    multivitamin Oral Chew Tab Chew 1 tablet by mouth in the morning.      zinc sulfate 220 (50 Zn) MG Oral Cap Take 1 capsule (220 mg total) by mouth in the morning.      rosuvastatin 20 MG Oral Tab Take 1 tablet (20 mg total) by mouth nightly. 90 tablet 3    gabapentin 300 MG Oral Cap TAKE 1 CAPSULE BY MOUTH TWO TIMES DAILY (Patient taking differently: Take 1 capsule (300 mg total) by mouth daily as needed. TAKE 1 CAPSULE BY MOUTH TWO TIMES DAILY) 90 capsule 3    amLODIPine 5 MG Oral Tab Take 1 tablet (5 mg total) by mouth daily. 90 tablet 3    losartan 50 MG Oral Tab Take 1 tablet (50 mg total) by mouth daily. 90 tablet 3    ciprofloxacin 500 MG Oral Tab Take 1 tablet (500 mg total) by mouth 2 (two) times daily. (Patient not taking: Reported on 1/3/2025) 20 tablet 3    diclofenac 75 MG Oral Tab EC Take 1 tablet (75 mg total) by mouth 2 (two) times daily. (Patient not taking: Reported on 1/3/2025) 60 tablet 3    clobetasol 0.05 % External Cream Apply 1  Application topically 2 (two) times daily. (Patient not taking: Reported on 1/3/2025) 30 g 2    albuterol 108 (90 Base) MCG/ACT Inhalation Aero Soln Inhale 2 puffs into the lungs every 6 (six) hours as needed for Wheezing. 1 each 3    Mometasone Furo-Formoterol Fum (DULERA) 100-5 MCG/ACT Inhalation Aerosol Inhale 2 puffs into the lungs 2 (two) times a day. (Patient not taking: Reported on 4/30/2025) 1 each 11

## 2025-05-27 NOTE — PATIENT INSTRUCTIONS
> Blood pressure controlled off antihypertensives    - Monitor VS  - Management per IM   Recent Results (from the past 24 hour(s))  -COMP METABOLIC PANEL (14)   Collection Time: 01/31/17 11:46 AM   Result Value Ref Range   Glucose 110 (H) 70-99 mg/dL   Sodium 136 136-144 mmol/L   Potassium 4.2 3.3-5.1 mmol/L   Chloride 102  mmol/L   CO

## 2025-06-04 ENCOUNTER — HOSPITAL ENCOUNTER (OUTPATIENT)
Dept: MAMMOGRAPHY | Facility: HOSPITAL | Age: 70
Discharge: HOME | End: 2025-06-04
Attending: INTERNAL MEDICINE
Payer: MEDICARE

## 2025-06-04 ENCOUNTER — LAB ENCOUNTER (OUTPATIENT)
Dept: LAB | Facility: HOSPITAL | Age: 70
End: 2025-06-04
Attending: INTERNAL MEDICINE
Payer: MEDICARE

## 2025-06-04 DIAGNOSIS — Z12.31 SCREENING MAMMOGRAM, ENCOUNTER FOR: ICD-10-CM

## 2025-06-04 LAB
ALBUMIN SERPL-MCNC: 4.5 G/DL (ref 3.2–4.8)
ALBUMIN/GLOB SERPL: 2.1 {RATIO} (ref 1–2)
ALP LIVER SERPL-CCNC: 66 U/L (ref 55–142)
ALT SERPL-CCNC: 9 U/L (ref 10–49)
ANION GAP SERPL CALC-SCNC: 5 MMOL/L (ref 0–18)
AST SERPL-CCNC: 17 U/L (ref ?–34)
BASOPHILS # BLD AUTO: 0.04 X10(3) UL (ref 0–0.2)
BASOPHILS NFR BLD AUTO: 0.9 %
BILIRUB SERPL-MCNC: 0.8 MG/DL (ref 0.2–1.1)
BUN BLD-MCNC: 10 MG/DL (ref 9–23)
BUN/CREAT SERPL: 14.7 (ref 10–20)
CALCIUM BLD-MCNC: 9.6 MG/DL (ref 8.7–10.4)
CHLORIDE SERPL-SCNC: 102 MMOL/L (ref 98–112)
CHOLEST SERPL-MCNC: 174 MG/DL (ref ?–200)
CO2 SERPL-SCNC: 27 MMOL/L (ref 21–32)
CREAT BLD-MCNC: 0.68 MG/DL (ref 0.55–1.02)
DEPRECATED RDW RBC AUTO: 51 FL (ref 35.1–46.3)
EGFRCR SERPLBLD CKD-EPI 2021: 95 ML/MIN/1.73M2 (ref 60–?)
EOSINOPHIL # BLD AUTO: 0.09 X10(3) UL (ref 0–0.7)
EOSINOPHIL NFR BLD AUTO: 2 %
ERYTHROCYTE [DISTWIDTH] IN BLOOD BY AUTOMATED COUNT: 15.6 % (ref 11–15)
FASTING PATIENT LIPID ANSWER: YES
FASTING STATUS PATIENT QL REPORTED: YES
GLOBULIN PLAS-MCNC: 2.1 G/DL (ref 2–3.5)
GLUCOSE BLD-MCNC: 110 MG/DL (ref 70–99)
HCT VFR BLD AUTO: 37.3 % (ref 35–48)
HDLC SERPL-MCNC: 62 MG/DL (ref 40–59)
HGB BLD-MCNC: 12.7 G/DL (ref 12–16)
IMM GRANULOCYTES # BLD AUTO: 0.01 X10(3) UL (ref 0–1)
IMM GRANULOCYTES NFR BLD: 0.2 %
LDLC SERPL CALC-MCNC: 100 MG/DL (ref ?–100)
LYMPHOCYTES # BLD AUTO: 1.04 X10(3) UL (ref 1–4)
LYMPHOCYTES NFR BLD AUTO: 22.6 %
MCH RBC QN AUTO: 31 PG (ref 26–34)
MCHC RBC AUTO-ENTMCNC: 34 G/DL (ref 31–37)
MCV RBC AUTO: 91 FL (ref 80–100)
MONOCYTES # BLD AUTO: 0.52 X10(3) UL (ref 0.1–1)
MONOCYTES NFR BLD AUTO: 11.3 %
NEUTROPHILS # BLD AUTO: 2.91 X10 (3) UL (ref 1.5–7.7)
NEUTROPHILS # BLD AUTO: 2.91 X10(3) UL (ref 1.5–7.7)
NEUTROPHILS NFR BLD AUTO: 63 %
NONHDLC SERPL-MCNC: 112 MG/DL (ref ?–130)
OSMOLALITY SERPL CALC.SUM OF ELEC: 278 MOSM/KG (ref 275–295)
PLATELET # BLD AUTO: 253 10(3)UL (ref 150–450)
POTASSIUM SERPL-SCNC: 4.3 MMOL/L (ref 3.5–5.1)
PROT SERPL-MCNC: 6.6 G/DL (ref 5.7–8.2)
RBC # BLD AUTO: 4.1 X10(6)UL (ref 3.8–5.3)
SODIUM SERPL-SCNC: 134 MMOL/L (ref 136–145)
TRIGL SERPL-MCNC: 65 MG/DL (ref 30–149)
TSI SER-ACNC: 2.03 UIU/ML (ref 0.55–4.78)
VLDLC SERPL CALC-MCNC: 11 MG/DL (ref 0–30)
WBC # BLD AUTO: 4.6 X10(3) UL (ref 4–11)

## 2025-06-04 PROCEDURE — 85025 COMPLETE CBC W/AUTO DIFF WBC: CPT | Performed by: INTERNAL MEDICINE

## 2025-06-04 PROCEDURE — 80061 LIPID PANEL: CPT | Performed by: INTERNAL MEDICINE

## 2025-06-04 PROCEDURE — 77063 BREAST TOMOSYNTHESIS BI: CPT | Performed by: INTERNAL MEDICINE

## 2025-06-04 PROCEDURE — 77067 SCR MAMMO BI INCL CAD: CPT | Performed by: INTERNAL MEDICINE

## 2025-06-04 PROCEDURE — 36415 COLL VENOUS BLD VENIPUNCTURE: CPT | Performed by: INTERNAL MEDICINE

## 2025-06-04 PROCEDURE — 84443 ASSAY THYROID STIM HORMONE: CPT | Performed by: INTERNAL MEDICINE

## 2025-06-04 PROCEDURE — 80053 COMPREHEN METABOLIC PANEL: CPT | Performed by: INTERNAL MEDICINE

## 2025-06-17 ENCOUNTER — OFFICE VISIT (OUTPATIENT)
Age: 70
End: 2025-06-17
Attending: INTERNAL MEDICINE
Payer: MEDICARE

## 2025-06-17 VITALS
WEIGHT: 154.38 LBS | HEART RATE: 66 BPM | RESPIRATION RATE: 16 BRPM | BODY MASS INDEX: 25 KG/M2 | OXYGEN SATURATION: 97 % | SYSTOLIC BLOOD PRESSURE: 124 MMHG | DIASTOLIC BLOOD PRESSURE: 68 MMHG | TEMPERATURE: 98 F

## 2025-06-17 DIAGNOSIS — Z85.3 HISTORY OF RIGHT BREAST CANCER: Primary | ICD-10-CM

## 2025-06-17 DIAGNOSIS — Z08 ENCOUNTER FOR FOLLOW-UP SURVEILLANCE OF BREAST CANCER: ICD-10-CM

## 2025-06-17 DIAGNOSIS — Z12.31 SCREENING MAMMOGRAM, ENCOUNTER FOR: ICD-10-CM

## 2025-06-17 DIAGNOSIS — Z85.3 ENCOUNTER FOR FOLLOW-UP SURVEILLANCE OF BREAST CANCER: ICD-10-CM

## 2025-06-17 NOTE — PROGRESS NOTES
HPI:  Nicole Mason is a 69 year old female with diagnosis of   Encounter Diagnoses   Name Primary?    History of right breast cancer Yes    Screening mammogram, encounter for     Encounter for follow-up surveillance of breast cancer        Feeling OK.    Keeping active. Weight stable.  States exercising and swimming.     No changes on SBE and states ROM is good on the RUE.  States exercise helps.     States that last July stopped smoking, then in [place] smoked again.  States in December and February had a cold and also not smoking.  States smoking ess and had 4 months of not smoking.  States smoking 1 pack at least for 3 days.      PN continues to improve.  Takes gabapentin every day or bid as needed.      Had more back pain, had XRAY and HAROON.  Has DJD and scoliosis.      ROS:  Review of Systems   Constitutional:  Negative for appetite change, fatigue and unexpected weight change.   Respiratory:  Negative for cough and shortness of breath.    Cardiovascular:  Negative for chest pain.   Gastrointestinal:  Negative for abdominal pain.   Genitourinary:  Positive for frequency and nocturia.    Musculoskeletal:  Positive for back pain (lumbar spine, improving with activity.). Negative for arthralgias and neck pain.        No bone pain.    States has to keep moving to prevent pain.   Neurological:  Negative for dizziness and headaches.   Hematological:  Negative for adenopathy.   Psychiatric/Behavioral:  Negative for sleep disturbance.          ALLERGIES AND MEDICATIONS REVIEWED WITH PATIENT:    Allergies:  Allergies   Allergen Reactions    Penicillins RASH         Current Outpatient Medications:     ciprofloxacin (CIPRO) 500 MG Oral Tab, Take 1 tablet (500 mg total) by mouth 2 (two) times daily., Disp: 20 tablet, Rfl: 3    levothyroxine 112 MCG Oral Tab, Take 1 tablet (112 mcg total) by mouth before breakfast., Disp: 90 tablet, Rfl: 3    ondansetron (ZOFRAN) 4 mg tablet, Take 1 tablet (4 mg total) by mouth every 8  (eight) hours as needed for Nausea., Disp: 20 tablet, Rfl: 2    multivitamin Oral Chew Tab, Chew 1 tablet by mouth in the morning., Disp: , Rfl:     zinc sulfate 220 (50 Zn) MG Oral Cap, Take 1 capsule (220 mg total) by mouth in the morning., Disp: , Rfl:     rosuvastatin 20 MG Oral Tab, Take 1 tablet (20 mg total) by mouth nightly., Disp: 90 tablet, Rfl: 3    gabapentin 300 MG Oral Cap, TAKE 1 CAPSULE BY MOUTH TWO TIMES DAILY (Patient taking differently: Take 1 capsule (300 mg total) by mouth daily as needed. TAKE 1 CAPSULE BY MOUTH TWO TIMES DAILY), Disp: 90 capsule, Rfl: 3    amLODIPine 5 MG Oral Tab, Take 1 tablet (5 mg total) by mouth daily., Disp: 90 tablet, Rfl: 3    losartan 50 MG Oral Tab, Take 1 tablet (50 mg total) by mouth daily., Disp: 90 tablet, Rfl: 3    ciprofloxacin 500 MG Oral Tab, Take 1 tablet (500 mg total) by mouth 2 (two) times daily., Disp: 20 tablet, Rfl: 3    diclofenac 75 MG Oral Tab EC, Take 1 tablet (75 mg total) by mouth 2 (two) times daily., Disp: 60 tablet, Rfl: 3    clobetasol 0.05 % External Cream, Apply 1 Application topically 2 (two) times daily., Disp: 30 g, Rfl: 2    albuterol 108 (90 Base) MCG/ACT Inhalation Aero Soln, Inhale 2 puffs into the lungs every 6 (six) hours as needed for Wheezing., Disp: 1 each, Rfl: 3    Mometasone Furo-Formoterol Fum (DULERA) 100-5 MCG/ACT Inhalation Aerosol, Inhale 2 puffs into the lungs 2 (two) times a day. (Patient not taking: Reported on 4/30/2025), Disp: 1 each, Rfl: 11          HISTORY REVIEWED WITH PATIENT:  Past Medical History:    Breast cancer (HCC)    right 2016    Cataract    Left eye    Disorder of thyroid    hypothyroid    Exposure to medical diagnostic radiation    High cholesterol    Personal history of antineoplastic chemotherapy    TCHP 6 cycles (3 completed), Herceptin every 3 weeks (last 2/22/17)    Visual impairment    glasses      Past Surgical History:   Procedure Laterality Date    Chemotherapy  2016     Dilation/curettage,diagnostic      Lumpectomy right  2017    Other      excision of lipoma at L arm and lower back    Radiation right      2016      Family History   Problem Relation Age of Onset    Diabetes Mother     Heart Disorder Mother         pacmaker    Other (parkinson's disease) Mother     Other (ALS) Father     Breast Cancer Self 61      Social History     Socioeconomic History    Marital status:     Number of children: 0   Occupational History    Occupation: Polish    Tobacco Use    Smoking status: Some Days     Current packs/day: 0.25     Average packs/day: 0.3 packs/day for 30.0 years (7.5 ttl pk-yrs)     Types: Cigarettes    Smokeless tobacco: Never    Tobacco comments:     plan to quit   Vaping Use    Vaping status: Never Used   Substance and Sexual Activity    Alcohol use: No    Drug use: No   Other Topics Concern    Caffeine Concern Yes     Comment: 2 cups of coffee daily            Exam:  /68 (BP Location: Left arm, Patient Position: Sitting, Cuff Size: adult)   Pulse 66   Temp 97.5 °F (36.4 °C) (Tympanic)   Resp 16   Wt 70 kg (154 lb 6.4 oz)   SpO2 97%   BMI 24.92 kg/m²   Wt Readings from Last 6 Encounters:   06/17/25 70 kg (154 lb 6.4 oz)   05/24/25 69.9 kg (154 lb)   04/30/25 68.5 kg (151 lb)   12/24/24 71.2 kg (157 lb)   08/09/24 69.9 kg (154 lb)   06/17/24 70.7 kg (155 lb 12.8 oz)     General: Patient is alert, not in acute distress.  HEENT: EOMs intact. PERRL.   Neck: No JVD. No palpable lymphadenopathy. Neck is supple.  Chest: Clear to auscultation.  Port scar on L  Breasts: R breast with surgical scar, no masses.  L breast w/o masses.  Heart: Regular rate and rhythm.   Abdomen: Soft, non tender with good bowel sounds.  Extremities: No edema.  Neurological: Grossly intact.   Lymphatics: There is no palpable lymphadenopathy throughout in the cervical, supraclavicular, axillary, or inguinal regions.  Psych/Depression: nl      Assessment and Plan:    Nicole  Marlon is a 69 year old female being evaluated for   Encounter Diagnoses   Name Primary?    History of right breast cancer Yes    Screening mammogram, encounter for     Encounter for follow-up surveillance of breast cancer        Breast cancer of upper-outer quadrant of right female breast (HCC)    Staging form: Breast, AJCC V7      Clinical stage from 10/5/2016: Stage IIA (T1c(3), N1, M0) - Signed by Leni Delgado MD on 10/5/2016      Pathologic stage from 3/22/2017: yT0, N0, cM0 - Signed by Leni Delgado MD on 3/22/2017      Patient achieved a pCR after neoadjuvant chemotherapy with s/p cycle 6 TCHP and a year of herceptin.      Prognosis is good.    B jett June of 2025  BiRads 2.   Will have B mammogram in June of 2026.    Lymphedema sleeve to prevent lymphedema during travel.    PN due to chemo:  Continue neurontin per Dr. Erazo.      Continue exercising and diet.    Smoking cessation.      All questions answered to the best of my abilities.  Support provided to the patient.      F/u in 12 months with mammogram.    MDM low      Quality measures:    Smoking cessation  Continue to try quit smoking.  Per PCP.          Data:   PROCEDURE: [facility] JETT 2D+3D SCREENING BILAT ([id number])      COMPARISON: [facility], [facility] JETT 2D+3D SCREENING BILAT ([id number]), 5/17/2022, 11:31 AM.  [facility], [facility] JETT 2D+3D DIAGNOSTIC ADDL VWS  RIGHT (OFE=[id number]), 6/02/2022, 10:57 AM.  [facility], [facility] JETT 2D+3D SCREENING BILAT ([id number]), 5/19/2023, 10:42 AM.  [facility], [facility] JETT 2D+3D SCREENING BILAT ([id number]), 6/03/2024, 11:32 AM.      INDICATIONS: screening      TECHNIQUE: Full field direct screening mammography was performed and images were reviewed with the Collexpo GLENN 1.5.1.5 CAD device.  3D tomosynthesis was performed and reviewed         BREAST COMPOSITION:   Category C - The breasts are heterogeneously dense,  which may obscure small masses.         FINDINGS: Surgical changes are seen in the right breast.  There is no suspicious asymmetry, mass, architectural distortion, or microcalcifications identified in either breast.                   Impression  CONCLUSION:   There is no mammographic evidence of malignancy in either breast. As long as patient's clinical breast exam remains unchanged, annual screening mammogram is recommended.      BI-RADS CATEGORY:     DIAGNOSTIC CATEGORY 2 - BENIGN FINDING:       RECOMMENDATIONS:   ROUTINE MAMMOGRAM AND CLINICAL EVALUATION IN 12 MONTHS.                   PLEASE NOTE: NORMAL MAMMOGRAM DOES NOT EXCLUDE THE POSSIBILITY OF BREAST CANCER.  A CLINICALLY SUSPICIOUS PALPABLE LUMP SHOULD BE BIOPSIED.       For patients over the age of 40, the target due date for the patient's next mammogram has been entered into a reminder system.       Patient received a discharge summary from the technologist after completion of exam.      Breast marker legend used on images    Triangle = Palpable lump   Alpine = Skin tag or mole   BB = Nipple   Linear shailesh = Scar   Square = Pain            Dictated by (CST): Lima Vo DO on 6/07/2025 at 11:03 AM       Finalized by (CST): Lima Vo DO on 6/07/2025 at 11:12 AM            [address] [place] [address]   [phone]

## (undated) DEVICE — SPONGE: SPECIALTY PEANUT XR 100/CS: Brand: MEDICAL ACTION INDUSTRIES

## (undated) DEVICE — UNDYED BRAIDED (POLYGLACTIN 910), SYNTHETIC ABSORBABLE SUTURE: Brand: COATED VICRYL

## (undated) DEVICE — MAJOR GENERAL: Brand: MEDLINE INDUSTRIES, INC.

## (undated) DEVICE — 3M™ STERI-STRIP™ REINFORCED ADHESIVE SKIN CLOSURES, R1547, 1/2 IN X 4 IN (12 MM X 100 MM), 6 STRIPS/ENVELOPE: Brand: 3M™ STERI-STRIP™

## (undated) DEVICE — CLIP SM INTNL HMCLP TNTLM ESCP

## (undated) DEVICE — SOL  .9 1000ML BTL

## (undated) DEVICE — CLIP MED INTNL HMCLP TNTLM

## (undated) DEVICE — SUCTION CANISTER, 3000CC,SAFELINER: Brand: DEROYAL

## (undated) DEVICE — TOWEL OR BLU 16X26 STRL

## (undated) DEVICE — E-Z CLEAN, NON-STICK, PTFE COATED, ELECTROSURGICAL BLADE ELECTRODE, MODIFIED EXTENDED INSULATION, 2.5 INCH (6.35 CM): Brand: MEGADYNE

## (undated) DEVICE — GAUZE SPONGES,12 PLY: Brand: CURITY

## (undated) DEVICE — SUTURE VICRYL 3-0 SH

## (undated) DEVICE — STANDARD HYPODERMIC NEEDLE,POLYPROPYLENE HUB: Brand: MONOJECT

## (undated) DEVICE — SUTURE PDS II 3-0 Z683G

## (undated) DEVICE — CONTAINER SPEC STR 4OZ GRY LID

## (undated) DEVICE — FLEXIBLE YANKAUER,MEDIUM TIP, NO VACUUM CONTROL: Brand: ARGYLE

## (undated) DEVICE — DRAPE PACK CHEST & U BAR

## (undated) DEVICE — 12 ML SYRINGE LUER-LOCK TIP: Brand: MONOJECT

## (undated) DEVICE — Device: Brand: JELCO

## (undated) DEVICE — ADHESIVE MASTISOL 2/3CC VL

## (undated) DEVICE — STERILE LATEX POWDER-FREE SURGICAL GLOVESWITH NITRILE COATING: Brand: PROTEXIS

## (undated) DEVICE — 6 ML SYRINGE LUER-LOCK TIP: Brand: MONOJECT

## (undated) DEVICE — SUTURE SILK 2-0 685H

## (undated) DEVICE — DRAPE TAPE: Brand: CONVERTORS

## (undated) DEVICE — COVER PRB NEOGUARD 30X2.6CM US

## (undated) DEVICE — DRAPE SHEET LG

## (undated) DEVICE — GOWN SURG AERO BLUE PERF LG

## (undated) NOTE — MR AVS SNAPSHOT
[facility] [place]  [address] [address] [phone]                    After Visit Summary   2/15/2017    Nancy Ac    MRN: [id number]           Visit Information        Provider Department Dept Phone    2/15/2 2/22/2017 10:30 AM RUSTY Palma [facility] Hematology Oncology    2/22/2017 11:30 AM EM [facility] INFRN [address] - Infusion    3/15/2017 10:00 AM [facility] Hematology Oncology    3/15/2017 11:00 AM EM CC I

## (undated) NOTE — MR AVS SNAPSHOT
Tho Mason   3/30/2017 10:30 AM   Office Visit   MRN:  [id number]    Description:  Female : 1955   Provider:  Lindsay Ramos   Department:  [facility]              [facility] [address]       Wednesday April 12, 2017 11:30 AM     Appointment with Trish Hodges at [facility] Hematology Oncology ([phone])   [phone]. [address]   [address] [place] [address]       Wednesday April 12, 2017 1:00 PM     Appointment wi

## (undated) NOTE — MR AVS SNAPSHOT
Leobardo Mason   2017 11:30 AM   Nurse Only   MRN:  [id number]    Description:  Female : 1955   Department:  [address] [place] [address] - [facility]              Visit Summary      Primary Visit Diagnosis     Breast cancer of u Procedure                               Abnormality         Status                     ---------                               -----------         ------                     CBC W/ DIFFERENTIAL[[id number]]          Abnormal            Final result  (L) 140-400  K/UL Final    MPV 8.5  7.4-10.3  fL Final    Neutrophil % 86   % Final    Lymphocyte % 10   % Final    Monocyte % 4   % Final    Eosinophil % 0   % Final    Basophil % 1   % Final    Neutrophil Absolute 3.4  1.8-7.7  K/UL Final    Lym

## (undated) NOTE — MR AVS SNAPSHOT
Christian Mason   2017 11:40 AM   Office Visit   MRN:  [id number]    Description:  Female : 1955   Provider:  Moon Diaz   Department:  [facility]              Visit Summary Wednesday October 04, 2017 11:30 AM     Appointment with Diego Pham at [address] [facility] ([phone])   [address] [place] [address]            MyChart     Visit [facility]  You can access your MyChart to more actively

## (undated) NOTE — IP AVS SNAPSHOT
[facility]    [address], [place], [place] ~ [phone]                Discharge Summary   3/13/2017    [address] [place] [address]            Admission Information        Provider Department    3/13/2017 Charity Salazar MD Em Axillary  Lymph Node Dissection  Susie Watson. Aydin Samaniego MD  General Instructions  The following instructions will provide helpful information that will assist your recovery. These are designed to be general guidelines.  Please remember that everyone heals and day and then shower. You should leave the steri-strips in place; they will start to peel off about 10 days after your surgery. The stitches are all underneath the skin and will dissolve on their own.  You will not need any stitches removed except if you hav squeezing to maintain the vacuum. ? Measure and record the output and discard the fluid into the toilet. Record the output each time you empty the bottle. ?  Keep track of the output (drainage) and when the total is down to 30 cc’s or less over a 24-inocencio The medication that you received for sedation or general anesthesia can last up to 24 hours. Your judgmentand reflexes may be altered, even if you feel like your normal self.       We Recommend:   · Do not drive any motor vehicle or bicycle   · Avoid mowing leave the hospital    We are gathering information on the outcomes of our patients after surgery so we can continue to enhance our surgical program.      Brigitte Ballard and your surgeon are proudly participating in the [address] [place] [address] 96.7    (12/20/16)  183 (12/20/16)  8.1      Recent Hematology Lab Results (cont.)  (Last 3 results in the past 90 days)    Neutrophil % Lymphocyte % Monocyte % Eosinophil % Basophil % Prelim Neut Abs Final Neut Abs Lymphocyte Abso Monocyte Absolu Eosinoph Medicaid plans. To get signed up and covered, please call [phone] and ask to get set up for an insurance coverage that is in-network with Brigitte Ballard.         Visit Information        Department Dept Phone    3/13/2017  5:39 AM [facility] Pacu 3

## (undated) NOTE — LETTER
[address], [place], [place]       AUTHORIZATION FOR SURGICAL OPERATION OR PROCEDURE    1.  I hereby authorize                                                      my Physician(s) and whomever may be designated as the [facility] to have an autologous transfusion of my own blood, or a directed donor transfusion, I will discuss this with my         Physician.   5.   I consent to the photographing of procedure(s) to be performed for the purposes of advancing medicine, science (Responsible person in case of minor or unconscious patient)   (Relationship to Patient)      _______________________________________________________________ ____________________________  (Witness signature)

## (undated) NOTE — MR AVS SNAPSHOT
[facility] [place]  [address] [address] [phone]                    After Visit Summary   3/22/2017    Laurie Last    MRN: [id number]           Visit Information        Provider Department Dept Phone    3/22/2 4/12/2017 1:00 PM EM [facility] INFRN [address] - Infusion    5/3/2017 11:30 AM [facility] Hematology Oncology    5/3/2017 12:30 PM EM [facility] INFRN [address] - Infusion      To-Do List     Future Trudi

## (undated) NOTE — MR AVS SNAPSHOT
Tay Mason   5/3/2017 11:30 AM   Office Visit   MRN:  [id number]    Description:  Female : 1955   Provider:  Vahid Simmons   Department:  [facility] Hematology Oncology              Visit Summary      Primary Visit Diagnos medical record, we can assist you to set up an appointment with the appropriate medical professional to review your records. Follow-up Instructions     Return in about 6 weeks (around 6/14/2017) for chemotherapy follow-up.       To Do List     Ross Green

## (undated) NOTE — LETTER
[address], [place], [place]       AUTHORIZATION FOR SURGICAL OPERATION OR PROCEDURE    1.  I hereby authorize Dr. Amor Chu MD,  my Physician(s) and whomever may be designated as the doctor's Assistant, to perform (CMV),and fluid overload. In the event that I wish         to have an autologous transfusion of my own blood, or a directed donor transfusion, I will discuss this with my         Physician.   5.   I consent to the photographing of procedure(s) to be perform __________________________________________  (Responsible person in case of minor or unconscious patient)   (Relationship to Patient)      _______________________________________________________________ ____________________________  (Witness signature)

## (undated) NOTE — MR AVS SNAPSHOT
Nima Shetty [address]  [facility] [address]  [phone]               Thank you for choosing us for your health care visit with Gina Rojas PTA.   We are glad to serve you and happy to provide you with If you've recently had a stay at the Hospital you can access your discharge instructions in [employer] by going to Visits < Admission Summaries.  If you've been to the Emergency Department or your doctor's office, you can view your past visit information in My

## (undated) NOTE — MR AVS SNAPSHOT
Irvin Mason   2017 3:00 PM   Office Visit   MRN:  [id number]    Description:  Female : 1955   Department:  [address] [place] [address]              Visit Summary      Primary Visit Diagnosis     Breast cancer of You can access your MyChart to more actively manage your health care and view more details from this visit by going to [email or web address].   If you've recently had a stay at the Hospital you can access your discharge instructions in [address] by karina

## (undated) NOTE — LETTER
[address]  INFORMED CONSENT FOR TRANSFUSION OF BLOOD OR BLOOD PRODUCTS   My physician has informed me of the nature, purpose, benefits and risks of transfusion for blood and blood components that he/she may deem nece (Signature of Patient)                                       (Responsible party in case of Minor,                                                                            Incompetent, or unconscious Patient)  __________________________________    _______

## (undated) NOTE — MR AVS SNAPSHOT
Adriano Mason   2017 10:30 AM   Office Visit   MRN:  [id number]    Description:  Female : 1955   Provider:  Sherry Estrella   Department:  [facility] Hematology Oncology              Visit Summary      Primary Visit Diagn medical record, we can assist you to set up an appointment with the appropriate medical professional to review your records. Follow-up Instructions     Return in about 4 weeks (around 3/22/2017) for chemo follow up, with labs.       To Do List     Juni Garcia

## (undated) NOTE — MR AVS SNAPSHOT
Ash Mason   2017 11:50 AM   Office Visit   MRN:  [id number]    Description:  Female : 1955   Provider:  Zina Islas   Department:  [address] [place] [address]              Visit Summary [address] [address]       Wednesday October 04, 2017 11:30 AM     Appointment with Roselia Almendarez at [address] [facility] ([phone])   [address] [facility]  [address] [place] [address]            MyChart     Visit [facility]

## (undated) NOTE — MR AVS SNAPSHOT
Rock Mary Jo Mason   2017 11:30 AM   Office Visit   MRN:  [id number]    Description:  Female : 1955   Department:  [address] [place] [address] - [facility]              Visit Summary      Primary Visit Diagnosis     Malignant neopla [address] [facility] [address]       Wednesday October 04, 2017 11:00 AM     Appointment with Festus Krabbe; Marbin Vila at [address] [facility] ([phone])   isaías [address] [place] [address] Jacquie   [facility] [address]       Wednesday October 04, 2017 11:30 A

## (undated) NOTE — MR AVS SNAPSHOT
Ash Mason   2017 11:50 AM   Office Visit   MRN:  [id number]    Description:  Female : 1955   Provider:  Zina Abdullahi   Department:  [facility]              Visit Summary [address] [facility] [address]       Monday May 08, 2017 11:50 AM     Appointment with Chan Sidhu; Gloria Vee at [address] [facility] ([phone])   Eric [address] [facility] Jacquie   [address] [facility] [address]       Monday May 15, 2017 11:50 AM     Appointm

## (undated) NOTE — MR AVS SNAPSHOT
After Visit Summary   2/1/2017    Judge Gonsalez    MRN: [id number]           Visit Information        Provider Department Dept Phone    2/1/2017 10:30  [address] [facility] Chemo Infusion [phone]      Your Vitals Were     BP Pul [facility] [address]       Dear Laila Tim : Thank you for enrolling in [address] [address] [facility]. Please follow the instructions below to securely access your online medical record.  [employer] allows you to send messages to your doctor, view test results, renew prescriptions Lifestyle Modification Recommendations:    Modification Recommendation   Weight Reduction Maintain normal body weight (body mass index 18.5-24.9 kg/m2)   DASH eating plan Adopt a diet rich in fruits, vegetables, and low fat dairy products with reduced cont

## (undated) NOTE — MR AVS SNAPSHOT
After Visit Summary   3/22/2017    Lisa Blair    MRN: [id number]           Visit Information        Provider Department Dept Phone    3/22/2017 12:30  [address] [facility] Chemo Infusion [phone]      Allergies as of 3/22/2017 Enjoy your food, but eat less. Fully enjoy your food when eating. Don’t eat while distracted and slow down. Avoid over sized portions. Don’t eat while when you’re bored.      EAT THESE FOODS MORE OFTEN: EAT THESE FOODS LESS OFTEN:   Make half your pl

## (undated) NOTE — MR AVS SNAPSHOT
[address] at [address] [facility] [place] [facility] [address] [phone]               Thank you for choosing us for your health care visit with [place] [address] NURSE.   We are glad to serve you and happy to provide you with this summ This list is accurate as of: 5/5/17  1:07 PM.  Always use your most recent med list.                cephALEXin 500 MG Caps   Take 1 capsule (500 mg total) by mouth 3 (three) times daily.    Commonly known as:  KEFLEX           COQ10 OR   Take by mouth daily Summaries. If you've been to the Emergency Department or your doctor's office, you can view your past visit information in [employer] by going to Visits < Visit Summaries. [employer]az questions? Call [phone] for help.   [employer] is NOT to be used for urge

## (undated) NOTE — MR AVS SNAPSHOT
Baljit Mason   2017 10:30 AM   Office Visit   MRN:  [id number]    Description:  Female : 1955   Department:  [address] [place] [address]              Visit Summary      Primary Visit Diagnosis     Breast cancer of Appointment with Randi Augustin at [facility] Hematology Oncology ([phone])   [phone]. [address] [place] [address]       Wednesday February 22, 2017 11:30 AM     Appointment with KACIE EM 2 at [address] [place] [address]

## (undated) NOTE — MR AVS SNAPSHOT
Nima Shetty 12 [address] [facility] [address]  [facility] [address]  [phone]               Thank you for choosing us for your health care visit with Vita Dale PTA.   We are glad to serve you and happy to provide you with

## (undated) NOTE — LETTER
[facility]  Administration of Anesthesia  1.    I Reinaldo Tracey, or _________________________________ acting on his/her behalf, (Patient) (Dependent/Representative) request to receive anesthesia for my pending procedure/operation/sarwat pressure, difficulty urinating, slowing of the baby's heart rate and headache. Rare risks include infections, high spinal         block, spinal bleeding, seizure, cardiac arrest and death.   7.   AWARENESS: I understand that it is possible (but unlike ________________________________________________  (Date) (Time)                                                                                                  (Responsible person in case of minor/ unconscious pt) /Relationship    My signature below affir

## (undated) NOTE — MR AVS SNAPSHOT
[address] [place] [address]    2017 1:00 PM   Office Visit   MRN:  [id number]    Description:  Female : 1955   Department:  [address] [place] [address]              Visit Summary      Primary Visit Diagnosis     Breast cancer of Summaries. If you've been to the Emergency Department or your doctor's office, you can view your past visit information in [employer] by going to Visits < Visit Summaries. [employer] questions? Call [phone] for help.   [employer] is NOT to be used for urge

## (undated) NOTE — LETTER
[address], [place]  Authorization for Invasive Procedures  1.  I hereby authorize Dr. Mane Conway , my physician and whomever may be designated as the doctor's assistant, to perform the following operation and/or procedure:  Chest [facility] 4. Should the need arise during my operation or immediate post-operative period; I also consent to the administration of blood and/or blood products.  Further, I understand that despite careful testing and screening of blood and blood products, I may still 9. Patients having a sterilization procedure: I understand that if the procedure is successful the results will be permanent and it will therefore be impossible for me to inseminate, conceive or bear children.  I also understand that the procedure is intend

## (undated) NOTE — MR AVS SNAPSHOT
Christofer Mason   3/22/2017 12:30 PM   Office Visit   MRN:  [id number]    Description:  Female : 1955   Department:  [address] [place] [address]              Visit Summary      Primary Visit Diagnosis     Breast cancer of You can access your MyChart to more actively manage your health care and view more details from this visit by going to [email or web address].   If you've recently had a stay at the Hospital you can access your discharge instructions in [address] by karina

## (undated) NOTE — MR AVS SNAPSHOT
Diana Mason   1/10/2017 11:00 AM   Nurse Only   MRN:  [id number]    Description:  Female : 1955   Department:  [address] [place] [address] [facility]              Visit Summary      Primary Visit Diagnosis     Breast cancer of u Appointment with EM TABITHA NEFFN 2 at [address] [place] [address] ([phone])   [address]   [facility] [address]            Result Summary for CBC WITH DIFFERENTIAL WITH PLATELET      Narrative     The following orders were created f

## (undated) NOTE — MR AVS SNAPSHOT
Guerda Monroeh Marlon   1/10/2017 12:00 PM   Office Visit   MRN:  [id number]    Description:  Female : 1955   Provider:  Oleg Puentes   Department:  [facility] AND [facility] Hematology Oncology              Visit Summary      Primary Visit Diagn Wednesday January 11, 2017 10:30 AM     Appointment with KACIE LU INFRN 3 at [address] [place] [address] ([phone])   [address]   [address] [place] [address]       Tuesday January 31, 2017 11:30 AM     Appointment with KACIE LU LAB2 at [place]

## (undated) NOTE — MR AVS SNAPSHOT
Rock Mary Jo Mason   2017 12:55 PM   Office Visit   MRN:  [id number]    Description:  Female : 1955   Provider:  Constanza Andrew   Department:  [address] [place] [address]              Visit Summary [address] [facility] Mohamud [place] [address]       Wednesday May 03, 2017 12:30 PM     Appointment with EM TABITHA EM 2 at [address] [place] [address] 15 ([phone])   [place] [address]       Monday May 08, 2017 11:50 AM

## (undated) NOTE — MR AVS SNAPSHOT
[facility] [place]  [address] [address] [phone]               Thank you for choosing us for your health care visit with Misael Crystal MD.  We are glad to serve you and happy to provide you with this summary of you Commonly known as:  DECADRON           dronabinol 2.5 MG Caps   Take 1 capsule (2.5 mg total) by mouth 2 (two) times daily before meals. Commonly known as:  MARINOL           gabapentin 100 MG Caps   Take 1 capsule (100 mg total) by mouth nightly.  Taper

## (undated) NOTE — MR AVS SNAPSHOT
Erik Mason   2017 11:50 AM   Office Visit   MRN:  [id number]    Description:  Female : 1955   Provider:  Nickolas Weller   Department:  [facility]              Visit Summary Wednesday July 05, 2017 11:30 AM     Appointment with KACIE EM 1 at [address] [place] [address] ([phone])   [address] [address]       Wednesday October 04, 2017 11:30 AM     Appointment with Didi Pereira at

## (undated) NOTE — MR AVS SNAPSHOT
Amy Mason   5/15/2017 11:50 AM   Office Visit   MRN:  [id number]    Description:  Female : 1955   Provider:  Diana Lam   Department:  [address] [place] [address]              Visit Summary appropriate medical professional to review your records. To Do List     Monday May 22, 2017 11:50 AM     Appointment with Marietta Vincent; Pillo Choudhary at [address] ([phone])   Eric Martinez Ashley Eloy Reynolds.   [facility]

## (undated) NOTE — MR AVS SNAPSHOT
Orangegavin Mason   2017 12:30 PM   Office Visit   MRN:  [id number]    Description:  Female : 1955   Provider:  Seb Bobby   Department:  [facility] Hematology Oncology              Visit Summary      Primary Visit Alyssa Rivers Calcium, Total 9.1 8.5-10.5 mg/dL   ALT 34 14-54 U/L   AST 34 15-41 U/L   Alkaline Phosphatase 64  U/L   Bilirubin, Total 0.3 0.3-1.2 mg/dL   Total Protein 6.0 5.9-8.4 g/dL   Albumin 3.9 3.5-4.8 g/dL   Globulin 2.1 (L) 2.5-3.7 g/dL   A/G Ratio 1.9 1. [address] [facility] [address]       Wednesday February 22, 2017 10:30 AM     Appointment with Lyudmila uMrphy at [facility] Hematology Oncology ([phone])   [address]   [address] [facility] [address]       Wednesday February 22, 2017 11:30 AM     Appointment

## (undated) NOTE — MR AVS SNAPSHOT
Becka Mason   3/22/2017 11:30 AM   Office Visit   MRN:  [id number]    Description:  Female : 1955   Provider:  Wili Luna   Department:  [facility] Hematology Oncology              Visit Summary      Primary Visit Merlin Handing Return in about 6 weeks (around 5/3/2017) for chemotherapy follow-up, with imaging.       To Do List     Wednesday March 22, 2017     Cardiology:  CHAD Mendez ([id number])        Wednesday March 22, 2017 2:45 PM     Appointment with Vivienne Love

## (undated) NOTE — LETTER
[address], [place], [place]       AUTHORIZATION FOR SURGICAL OPERATION OR PROCEDURE    1.  I hereby authorize                                                      my Physician(s) and whomever may be designated as the [facility] to have an autologous transfusion of my own blood, or a directed donor transfusion, I will discuss this with my         Physician.   5.   I consent to the photographing of procedure(s) to be performed for the purposes of advancing medicine, science (Responsible person in case of minor or unconscious patient)   (Relationship to Patient)      _______________________________________________________________ ____________________________  (Witness signature)

## (undated) NOTE — MR AVS SNAPSHOT
Maxbg Geneva Mason   2017 11:50 AM   Office Visit   MRN:  [id number]    Description:  Female : 1955   Provider:  Chan Vee   Department:  [facility]              Visit Summary You can access your MyChart to more actively manage your health care and view more details from this visit by going to [email or web address].   If you've recently had a stay at the Hospital you can access your discharge instructions in [address] by karina

## (undated) NOTE — MR AVS SNAPSHOT
Regan Vargas Marlon   2017 10:30 AM   Office Visit   MRN:  [id number]    Description:  Female : 1955   Provider:  Shahida Mcgraw   Department:  [facility] AND [facility] Hematology Oncology              Visit Summary      Primary Visit Guillermina Cruz an Open Medical Record policy. We can provide you with your current medication list and lab results today.   If you would like to review your medical record, we can assist you to set up an appointment with the appropriate medical professional to review you Call [phone] for help. [employer] is NOT to be used for urgent needs. For medical emergencies, dial 911.

## (undated) NOTE — MR AVS SNAPSHOT
[address] [place] [address]    2017 10:30 AM   Office Visit   MRN:  [id number]    Description:  Female : 1955   Department:  [address] [place] [address]              Visit Summary      Primary Visit Diagnosis     Breast cancer of Support Staff. Remember, [facility] is NOT to be used for urgent needs. For medical emergencies, dial 911. Visit [email or web address] to learn more.

## (undated) NOTE — MR AVS SNAPSHOT
[facility]  [address] [facility] [address]  [phone]                    After Visit Summary   4/5/2017    Latonya Conti    MRN: [id number]           Visit Information        Provider Department Dept Phone    4/5/201 Return in about 6 months (around 10/5/2017).       Future Appointments        Provider Department    4/11/2017 1:00 PM EM CC INFRN [address] - Infusion    4/11/2017 2:15 PM Katie Son [address] [place] [address]    5/3/2017

## (undated) NOTE — MR AVS SNAPSHOT
Hui Mason   5/3/2017 12:30 PM   Office Visit   MRN:  [id number]    Description:  Female : 1955   Department:  [address] [place] [address]              Visit Summary      Primary Visit Diagnosis     Breast cancer of [place] [address]       Wednesday June 14, 2017 12:00 PM     Appointment with KACIE EM 4 at [address] [facility] 15 ([phone])   [address]   [place] [address] [facility] [address]       Wednesday July 05, 2017 11:30 AM     Appointment with STEFAN

## (undated) NOTE — MR AVS SNAPSHOT
Becka Mason   2017 11:30 AM   Office Visit   MRN:  [id number]    Description:  Female : 1955   Department:  [address] [place] [address]              Visit Summary      Primary Visit Diagnosis     Breast cancer of

## (undated) NOTE — MR AVS SNAPSHOT
[address] at [address] [facility] [address] [phone]               Thank you for choosing us for your health care visit with Niraj Hernandez MD.  We are glad to serve you and happy to provide you with [facility] A Radiology Nurse will call you prior to your appointment and give you any further instructions if needed. Please arrive at Outpatient Registration 30 mintues prior to your appointment time. Medication Instructions:  If you are taking an NSAID (Motrin, A Coumadin (Warfarin) or similar blood thinners please call the ordering physician to see if that medication can be held for 7 days prior to the procedure.   It is important for Radiology to be notified if you are not able to hold any of the above medications Reason for Today's Visit     Pre-Op Exam           Medical Issues Discussed Today     Breast cancer of upper-outer quadrant of right female breast    Hypothyroid    Preoperative clearance    -  Primary      Instructions and Information about Your Health medications prescribed for you. Read the directions carefully, and ask your doctor or other care provider to review them with you.             MyChart     Visit [employer]  You can access your MyChart to more actively manage your health care and view more deta

## (undated) NOTE — MR AVS SNAPSHOT
[address] [facility] [address]  [phone]               Thank you for choosing us for your health care visit with [facility].   We are glad to serve you and happy to provide you with this s JAQUELIN POST BIOPSY IMAGE with [facility] OF THE [facility] [address] [facility] ([address])    [address]   [phone]           A Radiology Nurse will call you prior to your appointment and give yo please call the ordering physician to see if that medication can be held for 7 days or at the time of scheduling.   If you are taking aspirin, Coumadin (Warfarin) or similar blood thinners please call the ordering physician to see if that medication can be What changed:  Another medication with the same name was added. Make sure you understand how and when to take each. Commonly known as:  NEURONTIN           * gabapentin 300 MG Caps   Take 1 capsule (300 mg total) by mouth 3 (three) times daily.    What ch view more details from this visit by going to [email or web address]. If you've recently had a stay at the Hospital you can access your discharge instructions in [employer] by going to Visits < Admission Summaries.  If you've been to the Emergency Depar